# Patient Record
Sex: FEMALE | Race: WHITE | Employment: OTHER | URBAN - METROPOLITAN AREA
[De-identification: names, ages, dates, MRNs, and addresses within clinical notes are randomized per-mention and may not be internally consistent; named-entity substitution may affect disease eponyms.]

---

## 2017-02-10 VITALS
SYSTOLIC BLOOD PRESSURE: 143 MMHG | WEIGHT: 130 LBS | OXYGEN SATURATION: 97 % | TEMPERATURE: 97.5 F | DIASTOLIC BLOOD PRESSURE: 80 MMHG | HEIGHT: 64 IN | BODY MASS INDEX: 22.2 KG/M2 | RESPIRATION RATE: 18 BRPM | HEART RATE: 75 BPM

## 2017-02-10 NOTE — PROGRESS NOTES
[place] [facility]    Chief Complaint   Patient presents with     retirement Regulatory       HPI:    Celeste Hawthorne is a 89 year old  (7/30/1915), who is being seen today for a federally mandated E/M visit at [facility]. Today's concerns are:     1. Benign hypertension with CKD (chronic kidney disease) stage III    2. Hx of pleural effusion, Dec 2016    3. Mild cognitive impairment    4. Gait instability, recent fall in her home    5. Edema of both legs      ALLERGIES: Amoxicillin  PAST MEDICAL HISTORY:  has a past medical history of Anemia; Breast cancer (H); Constipation; History of blood transfusion; Hypertension; Left shoulder pain; Macular degeneration; and Malignant neoplasm (H) (1981).  PAST SURGICAL HISTORY:  has a past surgical history that includes Esophagoscopy (5/11/2012); Breast surgery (1981); Open reduction internal fixation femur distal (5/8/2012); Eye surgery; hemorrhoidectomy; Repair esophageal stricture; and Reverse arthroplasty shoulder (Left, 8/19/2014).  FAMILY HISTORY: family history includes Other Cancer in her son.  SOCIAL HISTORY:  reports that she has never smoked. She has never used smokeless tobacco. She reports that she drinks alcohol. She reports that she does not use illicit drugs.    MEDICATIONS:  Current Outpatient Prescriptions   Medication Sig Dispense Refill     Torsemide (DEMADEX PO) Take 2.5 mg by mouth daily       potassium chloride (KLOR-CON) 20 MEQ Packet Take 20 mEq by mouth daily       OXYCODONE HCL PO Take 2.5 mg by mouth as needed 2 PRN doses in 24 hrs       ammonium lactate (LAC-HYDRIN) 12 % lotion Apply topically 2 times daily Apply to All extremities topically every day and evening shift for Dry Skin       OXYCODONE HCL PO Take 2.5 mg by mouth 3 times daily 6-12-6       Acetaminophen (TYLENOL PO) Take 650 mg by mouth every 6 hours as needed for mild pain or fever       polyethylene glycol (MIRALAX/GLYCOLAX) packet Take 1  "packet by mouth daily       ferrous gluconate (FERGON) 324 (38 FE) MG tablet Take 324 mg by mouth daily (with breakfast)       gabapentin (NEURONTIN) 100 MG capsule Take 300 mg by mouth 2 times daily        phenylephrine-shark liver oil-mineral oil-petrolatum (PREP-HEM) 0.25-3-14-71.9 % rectal ointment Place 1 applicator rectally as needed for hemorrhoids       VITAMIN D, CHOLECALCIFEROL, PO Take 1,000 Units by mouth daily       multivitamin (OCUVITE) TABS Take 1 tablet by mouth daily       COMPRESSION STOCKINGS 1 each continuous. 1 each 0     atenolol (TENORMIN) 25 MG tablet Take 25 mg by mouth daily.       Medications reviewed:  Medications reconciled to facility chart and changes were made to reflect current medications as identified as above med list. Below are the changes that were made:   Medications stopped since last EPIC medication reconciliation:   There are no discontinued medications.    Medications started since last [facility] medication reconciliation:  No orders of the defined types were placed in this encounter.       Case Management:  I have reviewed the care plan and MDS and do agree with the plan. Patient's desire to return to the community is not present.  Information reviewed:  Medications, vital signs, orders, and nursing notes.    ROS:  4 point ROS including Respiratory, CV, GI and , other than that noted in the HPI,  is negative    Exam:  /80  Pulse 75  Temp 97.5  F (36.4  C)  Resp 18  Ht 5' 4\" (1.626 m)  Wt 130 lb (59 kg)  SpO2 97%  BMI 22.31 kg/m2  GENERAL APPEARANCE:  Alert, in no distress, cooperative  ENT:  Mouth and posterior oropharynx normal, moist mucous membranes, [place], does well in 1:1  EYES:  EOM, conjunctivae, lids, pupils and irises normal  RESP:  respiratory effort and palpation of chest normal, lungs clear to auscultation , no respiratory distress  CV:  Palpation and auscultation of heart done , regular rate and rhythm, no murmur, rub, or gallop, no murmur 3/6  ABDOMEN:  " normal bowel sounds, soft, nontender, no hepatosplenomegaly or other masses,   She had a soft large BM> I helped her to get cleaned up  M/S:   Gait and station abnormal > she stands well with assist  Depends on staff for transfers, is transported by staff with a w/c  SKIN:  Soft and intact  NEURO:   Cranial nerves 2-12 are normal tested and grossly at patient's baseline  PSYCH:  oriented to herself, insight and judgement impaired, memory impaired , very pleasant    Lab/Diagnostic data:      Last Basic Metabolic Panel:  NA      142   12/9/2016   POTASSIUM      3.9   12/9/2016  CHLORIDE      103   12/9/2016  HEIDY      9.6   12/9/2016  CO2       34   12/9/2016  BUN       26   12/9/2016  CR     1.46   12/9/2016  GLC       85   12/9/2016    WBC     12.9   12/5/2016  RBC     3.26   12/5/2016  HGB     10.8   12/5/2016  HCT     33.2   12/5/2016  MCV      102   12/5/2016  MCH     33.1   12/5/2016  MCHC     32.5   12/5/2016  RDW     13.1   12/5/2016  PLT      147   12/5/2016    ASSESSMENT/PLAN     Benign hypertension with CKD (chronic kidney disease) stage III> will renally adjust meds  Hx of pleural effusion> will cont with current tx> periodic BMP  Mild cognitive impairment> no new tx  Gait instability> encourage for staff to assist her with all transfers  Edema of both legs> cont with current JESSIE hose    Orders:  none    Total time spent with patient visit was 35 min including patient visit and review of past records.Greater than 50% of total time spent with counseling and coordinating care.    Electronically signed by:  ANN MARIE Prasad CNP

## 2017-02-13 ENCOUNTER — NURSING HOME VISIT (OUTPATIENT)
Dept: GERIATRICS | Facility: CLINIC | Age: 82
End: 2017-02-13
Payer: COMMERCIAL

## 2017-02-13 DIAGNOSIS — Z87.09 HX OF PLEURAL EFFUSION: ICD-10-CM

## 2017-02-13 DIAGNOSIS — R26.81 GAIT INSTABILITY: ICD-10-CM

## 2017-02-13 DIAGNOSIS — I12.9 BENIGN HYPERTENSION WITH CKD (CHRONIC KIDNEY DISEASE) STAGE III (H): Primary | ICD-10-CM

## 2017-02-13 DIAGNOSIS — N18.30 BENIGN HYPERTENSION WITH CKD (CHRONIC KIDNEY DISEASE) STAGE III (H): Primary | ICD-10-CM

## 2017-02-13 DIAGNOSIS — R60.0 EDEMA OF BOTH LEGS: ICD-10-CM

## 2017-02-13 DIAGNOSIS — G31.84 MILD COGNITIVE IMPAIRMENT: ICD-10-CM

## 2017-02-13 PROCEDURE — 99310 SBSQ NF CARE HIGH MDM 45: CPT | Performed by: NURSE PRACTITIONER

## 2017-02-13 PROCEDURE — 99207 ZZC CDG-UP CODE -MED NECESSITY: CPT | Performed by: NURSE PRACTITIONER

## 2017-03-10 ENCOUNTER — TELEPHONE (OUTPATIENT)
Dept: GERIATRICS | Facility: CLINIC | Age: 82
End: 2017-03-10

## 2017-03-10 NOTE — TELEPHONE ENCOUNTER
The health plan new enrollment has happened. I have reviewed the  MDS and facility care plan. The level of care is appropriate.

## 2017-03-10 NOTE — TELEPHONE ENCOUNTER
The health plan {[facility] product:084008} has happened. I have reviewed the  MDS and facility care plan. The level of care is appropriate.

## 2017-03-27 ENCOUNTER — HOSPITAL LABORATORY (OUTPATIENT)
Dept: OTHER | Facility: CLINIC | Age: 82
End: 2017-03-27

## 2017-03-27 LAB
ANION GAP SERPL CALCULATED.3IONS-SCNC: 7 MMOL/L (ref 3–14)
BUN SERPL-MCNC: 21 MG/DL (ref 7–30)
CALCIUM SERPL-MCNC: 9.2 MG/DL (ref 8.5–10.1)
CHLORIDE SERPL-SCNC: 104 MMOL/L (ref 94–109)
CO2 SERPL-SCNC: 32 MMOL/L (ref 20–32)
CREAT SERPL-MCNC: 1.23 MG/DL (ref 0.52–1.04)
GFR SERPL CREATININE-BSD FRML MDRD: 40 ML/MIN/1.7M2
GLUCOSE SERPL-MCNC: 163 MG/DL (ref 70–99)
HGB BLD-MCNC: 12.1 G/DL (ref 11.7–15.7)
POTASSIUM SERPL-SCNC: 4.2 MMOL/L (ref 3.4–5.3)
SODIUM SERPL-SCNC: 143 MMOL/L (ref 133–144)

## 2017-04-03 ENCOUNTER — NURSING HOME VISIT (OUTPATIENT)
Dept: GERIATRICS | Facility: CLINIC | Age: 82
End: 2017-04-03
Payer: COMMERCIAL

## 2017-04-03 VITALS
HEART RATE: 78 BPM | TEMPERATURE: 98.4 F | BODY MASS INDEX: 23.05 KG/M2 | WEIGHT: 135 LBS | SYSTOLIC BLOOD PRESSURE: 146 MMHG | DIASTOLIC BLOOD PRESSURE: 84 MMHG | HEIGHT: 64 IN | OXYGEN SATURATION: 95 % | RESPIRATION RATE: 18 BRPM

## 2017-04-03 DIAGNOSIS — R60.0 EDEMA OF BOTH LEGS: ICD-10-CM

## 2017-04-03 DIAGNOSIS — G31.84 MILD COGNITIVE IMPAIRMENT: ICD-10-CM

## 2017-04-03 DIAGNOSIS — N18.30 BENIGN HYPERTENSION WITH CKD (CHRONIC KIDNEY DISEASE) STAGE III (H): ICD-10-CM

## 2017-04-03 DIAGNOSIS — I12.9 BENIGN HYPERTENSION WITH CKD (CHRONIC KIDNEY DISEASE) STAGE III (H): ICD-10-CM

## 2017-04-03 DIAGNOSIS — M25.551 HIP PAIN, RIGHT: ICD-10-CM

## 2017-04-03 DIAGNOSIS — R54 FRAILTY: Primary | ICD-10-CM

## 2017-04-03 PROCEDURE — 99309 SBSQ NF CARE MODERATE MDM 30: CPT | Performed by: NURSE PRACTITIONER

## 2017-04-03 NOTE — PROGRESS NOTES
[place] [facility]    Chief Complaint   Patient presents with     Musculoskeletal Problem       HPI:    Celeste Hawthorne is a 89 year old  (7/30/1915), who is being seen today for an episodic care visit at [facility]. Today's concern is:  Frailty  She does look frail but also tough    Hip pain, right>> since she had a fall  She did talk about the R hip being bothersome    Mild cognitive impairment  She needed to reminded that she had fallen and the hip pain started afterwards    Benign hypertension with CKD (chronic kidney disease) stage III  FGR 40 , creat 1.23    Edema of both legs  She only had minimal edema this mid morning      ALLERGIES: Amoxicillin  Past Medical, Surgical, Family and Social History reviewed and updated in [facility].    Current Outpatient Prescriptions   Medication Sig Dispense Refill     ARTIFICIAL TEAR OP Place 1 drop into both eyes 2 times daily       Torsemide (DEMADEX PO) Take 2.5 mg by mouth daily       potassium chloride (KLOR-CON) 20 MEQ Packet Take 20 mEq by mouth daily       ammonium lactate (LAC-HYDRIN) 12 % lotion Apply topically 2 times daily Apply to All extremities topically every day and evening shift for Dry Skin       OXYCODONE HCL PO Take 2.5 mg by mouth 3 times daily 6-12-6       polyethylene glycol (MIRALAX/GLYCOLAX) packet Take 1 packet by mouth daily       ferrous gluconate (FERGON) 324 (38 FE) MG tablet Take 324 mg by mouth daily (with breakfast)       gabapentin (NEURONTIN) 100 MG capsule Take 300 mg by mouth 2 times daily        VITAMIN D, CHOLECALCIFEROL, PO Take 1,000 Units by mouth daily       multivitamin (OCUVITE) TABS Take 1 tablet by mouth daily       COMPRESSION STOCKINGS 1 each continuous. 1 each 0     atenolol (TENORMIN) 25 MG tablet Take 25 mg by mouth daily.       Medications reviewed:  Medications reconciled to facility chart and changes were made to reflect current medications as identified as above med list. Below are the  "changes that were made:   Medications stopped since last EPIC medication reconciliation:   Medications Discontinued During This Encounter   Medication Reason     Acetaminophen (TYLENOL PO) Therapy completed     OXYCODONE HCL PO Therapy completed     phenylephrine-shark liver oil-mineral oil-petrolatum (PREP-HEM) 0.25-3-14-71.9 % rectal ointment Therapy completed       Medications started since last [facility] medication reconciliation:  Orders Placed This Encounter   Medications     ARTIFICIAL TEAR OP     Sig: Place 1 drop into both eyes 2 times daily       REVIEW OF SYSTEMS:  4 point ROS including Respiratory, CV, GI and , other than that noted in the HPI,  is negative    Physical Exam:  /84  Pulse 78  Temp 98.4  F (36.9  C)  Resp 18  Ht 5' 4\" (1.626 m)  Wt 135 lb (61.2 kg)  SpO2 95%  BMI 23.17 kg/m2  GENERAL APPEARANCE:  Alert, in no distress, cooperative, she was pinning her hair in curls because she had a bath this am  ENT:  Mouth and posterior oropharynx normal, moist mucous membranes, normal hearing acuity  EYES:  EOM, conjunctivae, lids, pupils and irises normal  RESP:  respiratory effort and palpation of chest normal, lungs clear to auscultation , no respiratory distress  CV:  Palpation and auscultation of heart done , regular rate and rhythm, no murmur, rub, or gallop, peripheral edema trace+ in ankles  ABDOMEN:  normal bowel sounds, soft, nontender, no hepatosplenomegaly or other masses  M/S:   Gait and station abnormal > Depends on staff for transfers, is transported by staff with a w/c  SKIN:  Soft and intact  NEURO:   Cranial nerves 2-12 are normal tested and grossly at patient's baseline  PSYCH:  oriented to herself, insight and judgement impaired, memory impaired , affect and mood normal, pleasant    Recent Labs:      Hemoglobin   Date Value Ref Range Status   03/27/2017 12.1 11.7 - 15.7 g/dL Final   12/05/2016 10.8 (L) 11.7 - 15.7 g/dL Final     Last Basic Metabolic Panel:  Lab Results "   Component Value Date     03/27/2017      Lab Results   Component Value Date    POTASSIUM 4.2 03/27/2017     Lab Results   Component Value Date    CHLORIDE 104 03/27/2017     Lab Results   Component Value Date    HEIDY 9.2 03/27/2017     Lab Results   Component Value Date    CO2 32 03/27/2017     Lab Results   Component Value Date    BUN 21 03/27/2017     Lab Results   Component Value Date    CR 1.23 03/27/2017     Lab Results   Component Value Date     03/27/2017     Lab Results   Component Value Date    WBC 12.9 12/05/2016     Lab Results   Component Value Date    RBC 3.26 12/05/2016     Lab Results   Component Value Date    HGB 12.1 03/27/2017     Lab Results   Component Value Date    HCT 33.2 12/05/2016     Lab Results   Component Value Date     12/05/2016     Lab Results   Component Value Date    MCH 33.1 12/05/2016     Lab Results   Component Value Date    MCHC 32.5 12/05/2016     Lab Results   Component Value Date    RDW 13.1 12/05/2016     Lab Results   Component Value Date     12/05/2016         Assessment/Plan:  Frailty  Cont with supportive care    Hip pain, right>> since she had a fall  Cont with current scheduled pain meds    Mild cognitive impairment  No new tx  Cont with supportive care    Benign hypertension with CKD (chronic kidney disease) stage III  Will renally adjust meds    Edema of both legs  No change in tx 2nd to CKD      Orders:  none    Time 30 min    Electronically signed by  ANN MARIE Prasad CNP

## 2017-04-11 VITALS
HEIGHT: 64 IN | SYSTOLIC BLOOD PRESSURE: 149 MMHG | BODY MASS INDEX: 23.35 KG/M2 | WEIGHT: 136.8 LBS | HEART RATE: 66 BPM | OXYGEN SATURATION: 94 % | DIASTOLIC BLOOD PRESSURE: 68 MMHG | TEMPERATURE: 97.6 F | RESPIRATION RATE: 17 BRPM

## 2017-04-11 NOTE — PROGRESS NOTES
[place] [facility]    Chief Complaint   Patient presents with     shelter Regulatory       HPI:    Celeste Hawthorne is a 89 year old  (7/30/1915), who is being seen today for a federally mandated E/M visit at [facility]. Today's report:    Was started on low dose demadex. This has made an improvement.     Does have right hip pain. Did have a fall. No fracture. On low dose oxycodone. Needs assistance for transfers. Does self propel in the wheelchair.    ALLERGIES: Amoxicillin  PAST MEDICAL HISTORY:  has a past medical history of Anemia; Breast cancer (H); Constipation; History of blood transfusion; Hypertension; Left shoulder pain; Macular degeneration; and Malignant neoplasm (H) (1981).  PAST SURGICAL HISTORY:  has a past surgical history that includes Esophagoscopy (5/11/2012); Breast surgery (1981); Open reduction internal fixation femur distal (5/8/2012); Eye surgery; hemorrhoidectomy; Repair esophageal stricture; and Reverse arthroplasty shoulder (Left, 8/19/2014).  FAMILY HISTORY: family history includes Other Cancer in her son.  SOCIAL HISTORY:  reports that she has never smoked. She has never used smokeless tobacco. She reports that she drinks alcohol. She reports that she does not use illicit drugs.    MEDICATIONS:  Current Outpatient Prescriptions   Medication Sig Dispense Refill     OSELTAMIVIR PHOSPHATE PO Take 30 mg by mouth every other day for 5 Administrations       ARTIFICIAL TEAR OP Place 1 drop into both eyes 2 times daily       Torsemide (DEMADEX PO) Take 2.5 mg by mouth daily       potassium chloride (KLOR-CON) 20 MEQ Packet Take 20 mEq by mouth daily       ammonium lactate (LAC-HYDRIN) 12 % lotion Apply topically 2 times daily Apply to All extremities topically every day and evening shift for Dry Skin       OXYCODONE HCL PO Take 2.5 mg by mouth 3 times daily 6-12-6       polyethylene glycol (MIRALAX/GLYCOLAX) packet Take 1 packet by mouth daily        "ferrous gluconate (FERGON) 324 (38 FE) MG tablet Take 324 mg by mouth daily (with breakfast)       gabapentin (NEURONTIN) 100 MG capsule Take 300 mg by mouth 2 times daily        VITAMIN D, CHOLECALCIFEROL, PO Take 1,000 Units by mouth daily       multivitamin (OCUVITE) TABS Take 1 tablet by mouth daily       COMPRESSION STOCKINGS 1 each continuous. 1 each 0     atenolol (TENORMIN) 25 MG tablet Take 25 mg by mouth daily.       Medications reviewed:  Medications reconciled to facility chart and changes were made to reflect current medications as identified as above med list. Below are the changes that were made:   Medications stopped since last EPIC medication reconciliation:   There are no discontinued medications.    Medications started since last [facility] medication reconciliation:  Orders Placed This Encounter   Medications     OSELTAMIVIR PHOSPHATE PO     Sig: Take 30 mg by mouth every other day for 5 Administrations         Information reviewed:  Medications, vital signs.    ROS:  CV:  No palpitations.  RESPIRATORY: denies shortness of breath  GI:  Bowels working OK  MUSCULOSKELETAL: denies pain x right hip if she tries to stand.      Exam:  /68  Pulse 66  Temp 97.6  F (36.4  C)  Resp 17  Ht 5' 4\" (1.626 m)  Wt 136 lb 12.8 oz (62.1 kg)  SpO2 94%  BMI 23.48 kg/m2  GENERAL APPEARANCE:  Alert, in no distress  ENT:  Mucus membranes moist  RESP:  lungs clear to auscultation   CV:  Regular rate and rhythm.  ABDOMEN: soft, nontender  M/S:   Trace edema; has compression stockings.  PSYCH:   affect and mood normal       Lab/Diagnostic data:      Hemoglobin   Date Value Ref Range Status   03/27/2017 12.1 11.7 - 15.7 g/dL Final   12/05/2016 10.8 (L) 11.7 - 15.7 g/dL Final     Last Basic Metabolic Panel:  Lab Results   Component Value Date     03/27/2017      Lab Results   Component Value Date    POTASSIUM 4.2 03/27/2017     Lab Results   Component Value Date    CHLORIDE 104 03/27/2017     Lab Results "   Component Value Date    HEIDY 9.2 03/27/2017     Lab Results   Component Value Date    CO2 32 03/27/2017     Lab Results   Component Value Date    BUN 21 03/27/2017     Lab Results   Component Value Date    CR 1.23 03/27/2017     Lab Results   Component Value Date     03/27/2017     Lab Results   Component Value Date    WBC 12.9 12/05/2016     Lab Results   Component Value Date    RBC 3.26 12/05/2016     Lab Results   Component Value Date    HGB 12.1 03/27/2017     Lab Results   Component Value Date    HCT 33.2 12/05/2016     Lab Results   Component Value Date     12/05/2016     Lab Results   Component Value Date    MCH 33.1 12/05/2016     Lab Results   Component Value Date    MCHC 32.5 12/05/2016     Lab Results   Component Value Date    RDW 13.1 12/05/2016     Lab Results   Component Value Date     12/05/2016         ASSESSMENT/PLAN    Benign hypertension with CKD (chronic kidney disease) stage III  Relatively controlled.     Dementia without behavioral disturbance, unspecified dementia type  Continue current supports.     Hip pain, right>> since she had a fall  She is OK at rest; currently controlled.     Frailty  Continue current supports.      Electronically signed by:  Candie Amaya MD, MD

## 2017-04-12 ENCOUNTER — NURSING HOME VISIT (OUTPATIENT)
Dept: FAMILY MEDICINE | Facility: CLINIC | Age: 82
End: 2017-04-12
Payer: COMMERCIAL

## 2017-04-12 DIAGNOSIS — N18.30 BENIGN HYPERTENSION WITH CKD (CHRONIC KIDNEY DISEASE) STAGE III (H): Primary | ICD-10-CM

## 2017-04-12 DIAGNOSIS — R54 FRAILTY: ICD-10-CM

## 2017-04-12 DIAGNOSIS — F03.90 DEMENTIA WITHOUT BEHAVIORAL DISTURBANCE, UNSPECIFIED DEMENTIA TYPE: ICD-10-CM

## 2017-04-12 DIAGNOSIS — I12.9 BENIGN HYPERTENSION WITH CKD (CHRONIC KIDNEY DISEASE) STAGE III (H): Primary | ICD-10-CM

## 2017-04-12 DIAGNOSIS — M25.551 HIP PAIN, RIGHT: ICD-10-CM

## 2017-04-12 PROCEDURE — 99308 SBSQ NF CARE LOW MDM 20: CPT | Performed by: FAMILY MEDICINE

## 2017-04-12 NOTE — MR AVS SNAPSHOT
"              After Visit Summary   2017    Celeste Hawthorne    MRN: [id number]           Patient Information     Date Of Birth          1915        Visit Information        Provider Department      2017 1:37 PM Candie Amaya MD [facility]        Today's Diagnoses     Benign hypertension with CKD (chronic kidney disease) stage III    -  1    Dementia without behavioral disturbance, unspecified dementia type        Hip pain, right>> since she had a fall        Frailty           Follow-ups after your visit        Who to contact     If you have questions or need follow up information about today's clinic visit or your schedule please contact [facility] directly at [phone].  Normal or non-critical lab and imaging results will be communicated to you by MyChart, letter or phone within 4 business days after the clinic has received the results. If you do not hear from us within 7 days, please contact the clinic through MyChart or phone. If you have a critical or abnormal lab result, we will notify you by phone as soon as possible.  Submit refill requests through [employer] or call your pharmacy and they will forward the refill request to us. Please allow 3 business days for your refill to be completed.          Additional Information About Your Visit        MyChart Information     [employer] lets you send messages to your doctor, view your test results, renew your prescriptions, schedule appointments and more. To sign up, go to [email or web address] . Click on \"Log in\" on the left side of the screen, which will take you to the Welcome page. Then click on \"Sign up Now\" on the right side of the page.     You will be asked to enter the access code listed below, as well as some personal information. Please follow the directions to create your username and password.     Your access code is: [email or web address]  Expires: 2017 10:12 AM     Your access code will  in 90 days. If you " "need help or a new code, please call your [facility] clinic or [phone].        Care EveryWhere ID     This is your Care EveryWhere ID. This could be used by other organizations to access your [facility] medical records  [id number]        Your Vitals Were     Pulse Temperature Respirations Height Pulse Oximetry BMI (Body Mass Index)    66 97.6  F (36.4  C) 17 5' 4\" (1.626 m) 94% 23.48 kg/m2       Blood Pressure from Last 3 Encounters:   04/11/17 149/68   04/03/17 146/84   02/10/17 143/80    Weight from Last 3 Encounters:   04/11/17 136 lb 12.8 oz (62.1 kg)   04/03/17 135 lb (61.2 kg)   02/10/17 130 lb (59 kg)              Today, you had the following     No orders found for display       Primary Care Provider Office Phone # Fax #    Jaleesa Larkin ANN MARIE Leach [phone]       [facility] [address]  [address]  [facility] [address]        Thank you!     Thank you for choosing [facility]  for your care. Our goal is always to provide you with excellent care. Hearing back from our patients is one way we can continue to improve our services. Please take a few minutes to complete the written survey that you may receive in the mail after your visit with us. Thank you!             Your Updated Medication List - Protect others around you: Learn how to safely use, store and throw away your medicines at www.disposemymeds.org.          This list is accurate as of: 4/12/17 10:12 AM.  Always use your most recent med list.                   Brand Name Dispense Instructions for use    ammonium lactate 12 % lotion    LAC-HYDRIN     Apply topically 2 times daily Apply to All extremities topically every day and evening shift for Dry Skin       ARTIFICIAL TEAR OP      Place 1 drop into both eyes 2 times daily       atenolol 25 MG tablet    TENORMIN     Take 25 mg by mouth daily.       COMPRESSION STOCKINGS     1 each    1 each continuous.       DEMADEX PO      Take 2.5 mg by mouth " daily       ferrous gluconate 324 (38 FE) MG tablet    FERGON     Take 324 mg by mouth daily (with breakfast)       gabapentin 100 MG capsule    NEURONTIN     Take 300 mg by mouth 2 times daily       multivitamin Tabs tablet      Take 1 tablet by mouth daily       OSELTAMIVIR PHOSPHATE PO      Take 30 mg by mouth every other day for 5 Administrations       OXYCODONE HCL PO      Take 2.5 mg by mouth 3 times daily 6-12-6       polyethylene glycol Packet    MIRALAX/GLYCOLAX     Take 1 packet by mouth daily       potassium chloride 20 MEQ Packet    KLOR-CON     Take 20 mEq by mouth daily       VITAMIN D (CHOLECALCIFEROL) PO      Take 1,000 Units by mouth daily

## 2017-06-12 ENCOUNTER — NURSING HOME VISIT (OUTPATIENT)
Dept: GERIATRICS | Facility: CLINIC | Age: 82
End: 2017-06-12
Payer: COMMERCIAL

## 2017-06-12 VITALS
SYSTOLIC BLOOD PRESSURE: 140 MMHG | DIASTOLIC BLOOD PRESSURE: 73 MMHG | TEMPERATURE: 97.8 F | HEIGHT: 64 IN | OXYGEN SATURATION: 95 % | WEIGHT: 134.5 LBS | BODY MASS INDEX: 22.96 KG/M2 | RESPIRATION RATE: 18 BRPM

## 2017-06-12 DIAGNOSIS — N18.30 BENIGN HYPERTENSION WITH CKD (CHRONIC KIDNEY DISEASE) STAGE III (H): Primary | ICD-10-CM

## 2017-06-12 DIAGNOSIS — F03.90 DEMENTIA WITHOUT BEHAVIORAL DISTURBANCE, UNSPECIFIED DEMENTIA TYPE: ICD-10-CM

## 2017-06-12 DIAGNOSIS — Z86.718 PERSONAL HISTORY OF DVT (DEEP VEIN THROMBOSIS): ICD-10-CM

## 2017-06-12 DIAGNOSIS — R60.0 EDEMA OF BOTH LEGS: ICD-10-CM

## 2017-06-12 DIAGNOSIS — I12.9 BENIGN HYPERTENSION WITH CKD (CHRONIC KIDNEY DISEASE) STAGE III (H): Primary | ICD-10-CM

## 2017-06-12 DIAGNOSIS — M25.551 HIP PAIN, RIGHT: ICD-10-CM

## 2017-06-12 PROBLEM — Z87.81 HX OF FRACTURE OF FEMUR: Status: ACTIVE | Noted: 2017-06-12

## 2017-06-12 PROCEDURE — 99309 SBSQ NF CARE MODERATE MDM 30: CPT | Performed by: NURSE PRACTITIONER

## 2017-06-12 NOTE — PROGRESS NOTES
[place] [facility]    Chief Complaint   Patient presents with     custodial Regulatory       HPI:    Celeste Hawthorne is a 89 year old  (7/30/1915), who is being seen today for an regulatory visit at [facility].  HPI information obtained from: facility chart records.  Today's concern is:  Benign hypertension with CKD (chronic kidney disease) stage III  BP ~ 130/80  GFR 40    Edema of both legs  She did tell me that the edema bothers her    Personal history of DVT (deep vein thrombosis)  Has resolved> has not been on coumadin since 2015 per EPIC documentation    Dementia without behavioral disturbance, unspecified dementia type  She is pleasant , she keeps herself busy with word search    Hip pain, right>> since she had a fall  She stated the R hip hurt whenever she stands up      ALLERGIES: Amoxicillin  Past Medical, Surgical, Family and Social History reviewed and updated in [facility].    Current Outpatient Prescriptions   Medication Sig Dispense Refill     ARTIFICIAL TEAR OP Place 1 drop into both eyes 2 times daily       Torsemide (DEMADEX PO) Take 2.5 mg by mouth daily       potassium chloride (KLOR-CON) 20 MEQ Packet Take 20 mEq by mouth daily       ammonium lactate (LAC-HYDRIN) 12 % lotion Apply topically 2 times daily Apply to All extremities topically every day and evening shift for Dry Skin       OXYCODONE HCL PO Take 2.5 mg by mouth 3 times daily 6-12-6       polyethylene glycol (MIRALAX/GLYCOLAX) packet Take 1 packet by mouth daily       ferrous gluconate (FERGON) 324 (38 FE) MG tablet Take 324 mg by mouth daily (with breakfast)       gabapentin (NEURONTIN) 100 MG capsule Take 300 mg by mouth 2 times daily        VITAMIN D, CHOLECALCIFEROL, PO Take 1,000 Units by mouth daily       multivitamin (OCUVITE) TABS Take 1 tablet by mouth daily       COMPRESSION STOCKINGS 1 each continuous. 1 each 0     atenolol (TENORMIN) 25 MG tablet Take 25 mg by mouth daily.       Medications  "reviewed:  Medications reconciled to facility chart and changes were made to reflect current medications as identified as above med list. Below are the changes that were made:   Medications stopped since last EPIC medication reconciliation:   There are no discontinued medications.    Medications started since last [facility] medication reconciliation:  No orders of the defined types were placed in this encounter.    REVIEW OF SYSTEMS:  4 point ROS including Respiratory, CV, GI and , other than that noted in the HPI,  is negative    Physical Exam:  /73  Temp 97.8  F (36.6  C)  Resp 18  Ht 5' 4\" (1.626 m)  Wt 134 lb 8 oz (61 kg)  SpO2 95%  BMI 23.09 kg/m2  GENERAL APPEARANCE:  Alert, in no distress, cooperative, pleasant  ENT:  Mouth and posterior oropharynx normal, moist mucous membranes, [place], wears HAs  EYES:  EOM, conjunctivae, lids, pupils and irises normal, nice eye contact  RESP:  respiratory effort and palpation of chest normal, lungs clear to auscultation , no respiratory distress  CV:  Palpation and auscultation of heart done , peripheral edema 3+ in toes to knees, rate-normal, grade 3/6 murmur  ABDOMEN:  normal bowel sounds, soft, nontender, no hepatosplenomegaly or other masses  M/S:   Gait and station abnormal > she self propels her self in the w/c> needs assist for transfers  SKIN:  Soft and intact  NEURO:   Cranial nerves 2-12 are normal tested and grossly at patient's baseline  PSYCH:  oriented to herself and her surroundings, normal insight, judgement and memory    BP:  05/29- 06/12: 124-160/64-84 mmHg    Recent Labs:  CBC RESULTS:   Recent Labs   Lab Test  03/27/17   0925  12/05/16   0955   09/30/16   1641   WBC   --   12.9*   --   7.2   RBC   --   3.26*   --   3.28*   HGB  12.1  10.8*   < >  11.1*   HCT   --   33.2*   --   34.3*   MCV   --   102*   --   105*   MCH   --   33.1*   --   33.8*   MCHC   --   32.5   --   32.4   RDW   --   13.1   --   14.7   PLT   --   147*   --   150    < > = " values in this interval not displayed.       Last Basic Metabolic Panel:  Recent Labs   Lab Test  03/27/17   0925  12/09/16   0700   NA  143  142   POTASSIUM  4.2  3.9   CHLORIDE  104  103   HEIDY  9.2  9.6   CO2  32  34*   BUN  21  26   CR  1.23*  1.46*   GLC  163*  85     Assessment/Plan:  Benign hypertension with CKD (chronic kidney disease) stage III  No change in tx  Will renally adjust med as indicated    Edema of both legs  Not able to increase diuretic 2nd to impairment  I encouraged her to elevate her LE    Personal history of DVT (deep vein thrombosis)  Has resolved> off all anti-coagulation meds since 2015    Dementia without behavioral disturbance, unspecified dementia type  No new tx    Hip pain, right>> since she had a fall  She stated that the scheduled pain meds are effective  No change in tx      Orders:  Will stop the Fergon> then recheck Hgb in 4 weeks    Time> 30 min    Electronically signed by  ANN MARIE Prasad CNP

## 2017-06-16 ENCOUNTER — NURSING HOME VISIT (OUTPATIENT)
Dept: GERIATRICS | Facility: CLINIC | Age: 82
End: 2017-06-16
Payer: COMMERCIAL

## 2017-06-16 DIAGNOSIS — R05.9 COUGH: Primary | ICD-10-CM

## 2017-06-16 DIAGNOSIS — K44.9 HIATAL HERNIA: ICD-10-CM

## 2017-06-16 PROCEDURE — 99308 SBSQ NF CARE LOW MDM 20: CPT | Performed by: NURSE PRACTITIONER

## 2017-06-16 NOTE — PROGRESS NOTES
[place] [facility]    Chief Complaint   Patient presents with     Cough       HPI:    Celeste Hawthorne is a 89 year old  (7/30/1915), who is being seen today for an episodic care visit at [facility].   Today's concern is: nursing alerted me that Celeste is coughing  Cough  New cough> is not bother some to her    Hiatal hernia  Per chest x-ray      REVIEW OF SYSTEMS:  4 point ROS including Respiratory, CV, GI and , other than that noted in the HPI,  is negative    GENERAL APPEARANCE:  Alert, in no distress  She was mostly interested in going to a [employer] concert and told me about her  who played the piano    CV> AP ~ 72 RRR, soft murmur  Lungs> Has clear breath sounds, no adventitious sounds, no cough during my visit    Assessment/Plan  Cough  I did order chest xray> bi basilar atelectasis> she stated that her  smoked  Cardiomegaly      Hiatal hernia  No dx via chest x-ray> I told her and her dtr that this might at time cause her to cough  She denied acid reflux> no new tx  She is aware that she sits in a stooped manner    Dtr was present during my visit  And agreed with no new meds        Time 25 min    ANN MARIE Prasad CNP

## 2017-07-12 ENCOUNTER — HOSPITAL LABORATORY (OUTPATIENT)
Dept: OTHER | Facility: CLINIC | Age: 82
End: 2017-07-12

## 2017-07-12 LAB — HGB BLD-MCNC: 12.4 G/DL (ref 11.7–15.7)

## 2017-07-24 ENCOUNTER — HOSPITAL LABORATORY (OUTPATIENT)
Dept: OTHER | Facility: CLINIC | Age: 82
End: 2017-07-24

## 2017-07-24 LAB
ANION GAP SERPL CALCULATED.3IONS-SCNC: 6 MMOL/L (ref 3–14)
BUN SERPL-MCNC: 20 MG/DL (ref 7–30)
CALCIUM SERPL-MCNC: 9.3 MG/DL (ref 8.5–10.1)
CHLORIDE SERPL-SCNC: 105 MMOL/L (ref 94–109)
CO2 SERPL-SCNC: 32 MMOL/L (ref 20–32)
CREAT SERPL-MCNC: 1.13 MG/DL (ref 0.52–1.04)
GFR SERPL CREATININE-BSD FRML MDRD: 44 ML/MIN/1.7M2
GLUCOSE SERPL-MCNC: 66 MG/DL (ref 70–99)
HGB BLD-MCNC: 12.1 G/DL (ref 11.7–15.7)
POTASSIUM SERPL-SCNC: 4.2 MMOL/L (ref 3.4–5.3)
SODIUM SERPL-SCNC: 143 MMOL/L (ref 133–144)

## 2017-07-28 ENCOUNTER — NURSING HOME VISIT (OUTPATIENT)
Dept: GERIATRICS | Facility: CLINIC | Age: 82
End: 2017-07-28
Payer: COMMERCIAL

## 2017-07-28 VITALS
RESPIRATION RATE: 16 BRPM | OXYGEN SATURATION: 95 % | TEMPERATURE: 97.5 F | DIASTOLIC BLOOD PRESSURE: 84 MMHG | HEART RATE: 84 BPM | SYSTOLIC BLOOD PRESSURE: 146 MMHG | HEIGHT: 64 IN | WEIGHT: 135 LBS | BODY MASS INDEX: 23.05 KG/M2

## 2017-07-28 DIAGNOSIS — R60.0 EDEMA OF BOTH LEGS: ICD-10-CM

## 2017-07-28 DIAGNOSIS — I12.9 BENIGN HYPERTENSION WITH CKD (CHRONIC KIDNEY DISEASE) STAGE III (H): Primary | ICD-10-CM

## 2017-07-28 DIAGNOSIS — H54.3 IMPAIRED VISION IN BOTH EYES: ICD-10-CM

## 2017-07-28 DIAGNOSIS — N18.30 BENIGN HYPERTENSION WITH CKD (CHRONIC KIDNEY DISEASE) STAGE III (H): Primary | ICD-10-CM

## 2017-07-28 DIAGNOSIS — F03.90 DEMENTIA WITHOUT BEHAVIORAL DISTURBANCE, UNSPECIFIED DEMENTIA TYPE: ICD-10-CM

## 2017-07-28 PROCEDURE — 99309 SBSQ NF CARE MODERATE MDM 30: CPT | Performed by: NURSE PRACTITIONER

## 2017-07-28 NOTE — PROGRESS NOTES
[place] [facility]    Chief Complaint   Patient presents with     RECHECK     Leg Swelling       HPI:    Celeste Hawthorne is a 89 year old  (7/30/1915), who is being seen today for an episodic care visit at [facility].  HPI information obtained from: facility chart records.  Today's concern is:  Benign hypertension with CKD (chronic kidney disease) stage III  BP ~ 140/80  Occ higher with current tx    Edema of both legs  Does have 2-3 + pitting elisabeth in her feet    Impaired vision in both eyes  Is able to use large print but wishes she could see better    Dementia without behavioral disturbance, unspecified dementia type  Is very pleasant      ALLERGIES: Amoxicillin  Past Medical, Surgical, Family and Social History reviewed and updated in [facility].    Current Outpatient Prescriptions   Medication Sig Dispense Refill     ARTIFICIAL TEAR OP Place 1 drop into both eyes 2 times daily       Torsemide (DEMADEX PO) Take 2.5 mg by mouth daily       potassium chloride (KLOR-CON) 20 MEQ Packet Take 20 mEq by mouth daily       ammonium lactate (LAC-HYDRIN) 12 % lotion Apply topically 2 times daily Apply to All extremities topically every day and evening shift for Dry Skin       OXYCODONE HCL PO Take 2.5 mg by mouth 3 times daily 6-12-6       polyethylene glycol (MIRALAX/GLYCOLAX) packet Take 1 packet by mouth daily       gabapentin (NEURONTIN) 100 MG capsule Take 300 mg by mouth 2 times daily        VITAMIN D, CHOLECALCIFEROL, PO Take 1,000 Units by mouth daily       multivitamin (OCUVITE) TABS Take 1 tablet by mouth daily       COMPRESSION STOCKINGS 1 each continuous. 1 each 0     atenolol (TENORMIN) 25 MG tablet Take 25 mg by mouth daily.       Medications reviewed:  Medications reconciled to facility chart and changes were made to reflect current medications as identified as above med list. Below are the changes that were made:   Medications stopped since last EPIC medication reconciliation:  "  Medications Discontinued During This Encounter   Medication Reason     ferrous gluconate (FERGON) 324 (38 FE) MG tablet Medication Reconciliation Clean Up       Medications started since last EPIC medication reconciliation:  No orders of the defined types were placed in this encounter.    REVIEW OF SYSTEMS:  4 point ROS including Respiratory, CV, GI and , other than that noted in the HPI,  is negative    Physical Exam:  /84  Pulse 84  Temp 97.5  F (36.4  C)  Resp 16  Ht 5' 4\" (1.626 m)  Wt 135 lb (61.2 kg)  SpO2 95%  BMI 23.17 kg/m2  GENERAL APPEARANCE:  Alert, in no distress, cooperative  ENT:  Mouth and posterior oropharynx normal, moist mucous membranes, [place], uses HAs  EYES:  EOM, conjunctivae, lids, pupils and irises normal, wishes she could see better, does well with large print  RESP:  respiratory effort and palpation of chest normal, lungs clear to auscultation , no respiratory distress  CV:  Palpation and auscultation of heart done , peripheral edema 3+ in both feet, grade 3/6 murmur  ABDOMEN:  normal bowel sounds, soft, nontender, no hepatosplenomegaly or other masses  M/S:   Gait and station abnormal >Depends on staff for transfers, is transported by staff with a w/c  SKIN:  Soft and intact  NEURO:   Cranial nerves 2-12 are normal tested and grossly at patient's baseline  PSYCH:  normal insight, judgement and memory, affect and mood normal, is remarkable, told me that she will be 89 yrs old in 2 days     BP 07/20-07/27: 132-168/76-94 mmHg    Recent Labs:    CBC RESULTS:   Recent Labs   Lab Test  07/24/17   0636  07/12/17   0605   12/05/16   0955   09/30/16   1641   WBC   --    --    --   12.9*   --   7.2   RBC   --    --    --   3.26*   --   3.28*   HGB  12.1  12.4   < >  10.8*   < >  11.1*   HCT   --    --    --   33.2*   --   34.3*   MCV   --    --    --   102*   --   105*   MCH   --    --    --   33.1*   --   33.8*   MCHC   --    --    --   32.5   --   32.4   RDW   --    --    --   " 13.1   --   14.7   PLT   --    --    --   147*   --   150    < > = values in this interval not displayed.       Last Basic Metabolic Panel:  Recent Labs   Lab Test  07/24/17   0636  03/27/17   0925   NA  143  143   POTASSIUM  4.2  4.2   CHLORIDE  105  104   HEIDY  9.3  9.2   CO2  32  32   BUN  20  21   CR  1.13*  1.23*   GLC  66*  163*     Assessment/Plan:  Benign hypertension with CKD (chronic kidney disease) stage III  Will talk with MD about adding low dose Apresoline    Edema of both legs  Low dose Apresoline may also help with LE edema    Impaired vision in both eyes  Dtr wishes to take mother to Ophthalmologist who has seen her for yrs    Dementia without behavioral disturbance, unspecified dementia type  No tx      Orders:  none    Time> 25 min    Electronically signed by  ANN MARIE Prasad CNP

## 2017-08-15 VITALS
TEMPERATURE: 96.9 F | HEIGHT: 64 IN | OXYGEN SATURATION: 96 % | DIASTOLIC BLOOD PRESSURE: 76 MMHG | WEIGHT: 134 LBS | HEART RATE: 68 BPM | RESPIRATION RATE: 16 BRPM | SYSTOLIC BLOOD PRESSURE: 143 MMHG | BODY MASS INDEX: 22.88 KG/M2

## 2017-08-16 ENCOUNTER — NURSING HOME VISIT (OUTPATIENT)
Dept: FAMILY MEDICINE | Facility: CLINIC | Age: 82
End: 2017-08-16
Payer: COMMERCIAL

## 2017-08-16 DIAGNOSIS — H54.7 DECREASED VISION: ICD-10-CM

## 2017-08-16 DIAGNOSIS — M25.551 HIP PAIN, RIGHT: ICD-10-CM

## 2017-08-16 DIAGNOSIS — R60.9 EDEMA, UNSPECIFIED TYPE: Primary | ICD-10-CM

## 2017-08-16 DIAGNOSIS — I12.9 BENIGN HYPERTENSION WITH CKD (CHRONIC KIDNEY DISEASE) STAGE III (H): ICD-10-CM

## 2017-08-16 DIAGNOSIS — N18.30 CKD (CHRONIC KIDNEY DISEASE) STAGE 3, GFR 30-59 ML/MIN (H): ICD-10-CM

## 2017-08-16 DIAGNOSIS — N18.30 BENIGN HYPERTENSION WITH CKD (CHRONIC KIDNEY DISEASE) STAGE III (H): ICD-10-CM

## 2017-08-16 PROCEDURE — 99309 SBSQ NF CARE MODERATE MDM 30: CPT | Performed by: FAMILY MEDICINE

## 2017-08-16 NOTE — MR AVS SNAPSHOT
"              After Visit Summary   2017    Celeste Hawthorne    MRN: [id number]           Patient Information     Date Of Birth          1915        Visit Information        Provider Department      2017 8:47 PM Candie Amaya MD [facility]        Today's Diagnoses     Edema, unspecified type    -  1    CKD (chronic kidney disease) stage 3, GFR 30-59 ml/min        Benign hypertension with CKD (chronic kidney disease) stage III        Decreased vision        Hip pain, right           Follow-ups after your visit        Who to contact     If you have questions or need follow up information about today's clinic visit or your schedule please contact [facility] directly at [phone].  Normal or non-critical lab and imaging results will be communicated to you by MyChart, letter or phone within 4 business days after the clinic has received the results. If you do not hear from us within 7 days, please contact the clinic through [employer] or phone. If you have a critical or abnormal lab result, we will notify you by phone as soon as possible.  Submit refill requests through [employer] or call your pharmacy and they will forward the refill request to us. Please allow 3 business days for your refill to be completed.          Additional Information About Your Visit        MyChart Information     [employer] lets you send messages to your doctor, view your test results, renew your prescriptions, schedule appointments and more. To sign up, go to [email or web address] . Click on \"Log in\" on the left side of the screen, which will take you to the Welcome page. Then click on \"Sign up Now\" on the right side of the page.     You will be asked to enter the access code listed below, as well as some personal information. Please follow the directions to create your username and password.     Your access code is: [email or web address]  Expires: 2017 11:41 AM     Your access code will  in 90 days. " "If you need help or a new code, please call your [place] clinic or [phone].        Care EveryWhere ID     This is your Care EveryWhere ID. This could be used by other organizations to access your [facility] medical records  [id number]        Your Vitals Were     Pulse Temperature Respirations Height Pulse Oximetry BMI (Body Mass Index)    68 96.9  F (36.1  C) 16 5' 4\" (1.626 m) 96% 23 kg/m2       Blood Pressure from Last 3 Encounters:   08/15/17 143/76   07/28/17 146/84   06/12/17 140/73    Weight from Last 3 Encounters:   08/15/17 134 lb (60.8 kg)   07/28/17 135 lb (61.2 kg)   06/12/17 134 lb 8 oz (61 kg)              Today, you had the following     No orders found for display       Primary Care Provider Office Phone # Fax #    Jaleesa Larkin MarvCarlos A, APRN [facility] [phone]       [address]  [facility] [address]        Equal Access to Services     [facility]: Hadii aad ku hadasho Soomaali, waaxda luqadaha, qaybta kaalmada adeegyada, marysol chang . So [facility] [phone].    ATENCIÓN: Si habla español, tiene a bailon disposición servicios gratuitos de asistencia lingüística. [facility] [phone].    We comply with applicable federal civil rights laws and [place] laws. We do not discriminate on the basis of race, color, national origin, age, disability sex, sexual orientation or gender identity.            Thank you!     Thank you for choosing [facility]  for your care. Our goal is always to provide you with excellent care. Hearing back from our patients is one way we can continue to improve our services. Please take a few minutes to complete the written survey that you may receive in the mail after your visit with us. Thank you!             Your Updated Medication List - Protect others around you: Learn how to safely use, store and throw away your medicines at www.disposemymeds.org.          This list is accurate as of: 8/16/17 11:41 AM.  " Always use your most recent med list.                   Brand Name Dispense Instructions for use Diagnosis    ammonium lactate 12 % lotion    LAC-HYDRIN     Apply topically 2 times daily Apply to All extremities topically every day and evening shift for Dry Skin        ARTIFICIAL TEAR OP      Place 1 drop into both eyes 2 times daily        atenolol 25 MG tablet    TENORMIN     Take 25 mg by mouth daily.        COMPRESSION STOCKINGS     1 each    1 each continuous.    Femur fracture, right (H)       DEMADEX PO      Take 2.5 mg by mouth daily        gabapentin 100 MG capsule    NEURONTIN     Take 300 mg by mouth 2 times daily        multivitamin Tabs tablet      Take 1 tablet by mouth daily        OXYCODONE HCL PO      Take 2.5 mg by mouth 3 times daily 6-12-6        polyethylene glycol Packet    MIRALAX/GLYCOLAX     Take 1 packet by mouth daily        potassium chloride 20 MEQ Packet    KLOR-CON     Take 20 mEq by mouth daily        VITAMIN D (CHOLECALCIFEROL) PO      Take 1,000 Units by mouth daily

## 2017-08-16 NOTE — PROGRESS NOTES
[place] [facility]    Chief Complaint   Patient presents with     FPC Regulatory       HPI:    Celeste Hawthorne is a 89 year old  (7/30/1915), who is being seen today for a federally mandated E/M visit at [facility].  HPI information obtained from: facility chart records. Today's report:    She has been stable.    She had a cough a few weeks ago.  Did have CXR. No pneumonia. There were other chronic changes.  No antibiotic.    Family would like her to see eye doctor again.   She has thought her glasses were not working as well.  She does have macular degeneration.   They may do that.     Does have edema. Can be significant.  She does have CKD.   She does wear support hose.     Ongoing right hip pain.  Gets scheduled oxycodone.    Some forgetfulness, but is fairly cognitively intact.      ALLERGIES: Amoxicillin  PAST MEDICAL HISTORY:  has a past medical history of Anemia; Anemia due to blood loss, acute (5/14/2012); Breast cancer (H); Constipation; History of blood transfusion; Hypertension; Left shoulder pain; Macular degeneration; and Malignant neoplasm (H) (1981).  PAST SURGICAL HISTORY:  has a past surgical history that includes Esophagoscopy (5/11/2012); Breast surgery (1981); Open reduction internal fixation femur distal (5/8/2012); Eye surgery; hemorrhoidectomy; Repair esophageal stricture; and Reverse arthroplasty shoulder (Left, 8/19/2014).  FAMILY HISTORY: family history includes Other Cancer in her son.  SOCIAL HISTORY:  reports that she has never smoked. She has never used smokeless tobacco. She reports that she drinks alcohol. She reports that she does not use illicit drugs.    MEDICATIONS:  Current Outpatient Prescriptions   Medication Sig Dispense Refill     ARTIFICIAL TEAR OP Place 1 drop into both eyes 2 times daily       Torsemide (DEMADEX PO) Take 2.5 mg by mouth daily       potassium chloride (KLOR-CON) 20 MEQ Packet Take 20 mEq by mouth daily        "ammonium lactate (LAC-HYDRIN) 12 % lotion Apply topically 2 times daily Apply to All extremities topically every day and evening shift for Dry Skin       OXYCODONE HCL PO Take 2.5 mg by mouth 3 times daily 6-12-6       polyethylene glycol (MIRALAX/GLYCOLAX) packet Take 1 packet by mouth daily       gabapentin (NEURONTIN) 100 MG capsule Take 300 mg by mouth 2 times daily        VITAMIN D, CHOLECALCIFEROL, PO Take 1,000 Units by mouth daily       multivitamin (OCUVITE) TABS Take 1 tablet by mouth daily       COMPRESSION STOCKINGS 1 each continuous. 1 each 0     atenolol (TENORMIN) 25 MG tablet Take 25 mg by mouth daily.       Medications reviewed:  Medications reconciled to facility chart and changes were made to reflect current medications as identified as above med list. Below are the changes that were made:   Medications stopped since last EPIC medication reconciliation:   There are no discontinued medications.    Medications started since last [facility] medication reconciliation:  No orders of the defined types were placed in this encounter.          ROS:  CV:  No palpitations.  RESPIRATORY: denies shortness of breath  GI:  Bowels working OK  MUSCULOSKELETAL: denies pain when just sitting, but does have ongoing right hip/leg pain if laying down or moving.      Exam:  Vitals: /76  Pulse 68  Temp 96.9  F (36.1  C)  Resp 16  Ht 5' 4\" (1.626 m)  Wt 134 lb (60.8 kg)  SpO2 96%  BMI 23 kg/m2  BMI= Body mass index is 23 kg/(m^2).    GENERAL APPEARANCE:  Alert, in no distress. Appears younger than stated age.  ENT:  Mucus membranes moist  RESP:  lungs clear to auscultation   CV:  Regular rate and rhythm.  ABDOMEN:  soft, nontender  M/S:   ~1+ edema  PSYCH:   affect and mood normal       Lab/Diagnostic data:    CBC RESULTS:   Recent Labs   Lab Test  07/24/17   0636  07/12/17   0605   12/05/16   0955   09/30/16   1641   WBC   --    --    --   12.9*   --   7.2   RBC   --    --    --   3.26*   --   3.28*   HGB  12.1  " 12.4   < >  10.8*   < >  11.1*   HCT   --    --    --   33.2*   --   34.3*   MCV   --    --    --   102*   --   105*   MCH   --    --    --   33.1*   --   33.8*   MCHC   --    --    --   32.5   --   32.4   RDW   --    --    --   13.1   --   14.7   PLT   --    --    --   147*   --   150    < > = values in this interval not displayed.       Last Basic Metabolic Panel:  Recent Labs   Lab Test  07/24/17   0636  03/27/17   0925   NA  143  143   POTASSIUM  4.2  4.2   CHLORIDE  105  104   HEIDY  9.3  9.2   CO2  32  32   BUN  20  21   CR  1.13*  1.23*   GLC  66*  163*     GFR Estimate   Date Value Ref Range Status   07/24/2017 44 (L) >60 mL/min/1.7m2 Final     Comment:     Non  GFR Calc   03/27/2017 40 (L) >60 mL/min/1.7m2 Final     Comment:     Non  GFR Calc   12/09/2016 33 (L) >60 mL/min/1.7m2 Final     Comment:     Non  GFR Calc           ASSESSMENT/PLAN  Edema, unspecified type  Currently on torsemide.  Agree with not increasing further; concerns with kidney.  Agree with encouraging mechanical methods with compression stockings, elevation    CKD (chronic kidney disease) stage 3, GFR 30-59 ml/min  Keep in consideration when adjusting or adding medication as above.    Benign hypertension with CKD (chronic kidney disease) stage III  Satisfactory control.    Decreased vision  She does have macular degeneration; not sure if much can be done for her vision if this is the explanation. Family will probably take to eye doctor.     Hip pain, right  Chronic since a fall. Has been evaluated in the past. She does appear more comfortable with the oxycodone. She has been out and joining into activities.       Electronically signed by:  Candie Amaya MD, MD

## 2017-10-05 VITALS
TEMPERATURE: 98.2 F | DIASTOLIC BLOOD PRESSURE: 90 MMHG | HEART RATE: 77 BPM | BODY MASS INDEX: 23.56 KG/M2 | WEIGHT: 138 LBS | HEIGHT: 64 IN | RESPIRATION RATE: 16 BRPM | OXYGEN SATURATION: 95 % | SYSTOLIC BLOOD PRESSURE: 158 MMHG

## 2017-10-05 NOTE — PROGRESS NOTES
[facility]    Chief Complaint   Patient presents with     skilled nursing Regulatory       HPI:    Celeste Hawthorne is a 89 year old  (7/30/1915), who is being seen today for a federally mandated E/M visit at [facility].  HPI information obtained from: facility chart records, facility staff, patient report and [facility] chart review.   Today's concerns are:  Benign hypertension with CKD (chronic kidney disease) stage III  BP's ~ 140/90      Hx of pleural effusion, Dec 2016  She denied SOB or cough today    Hip pain, right>> since she had a fall  Cont to have discomfort in R hip elisabeth with standing    CKD (chronic kidney disease) stage 3, GFR 30-59 ml/min  GFR 44; creat 1.13    Edema of both legs  The edema always bothers her      ALLERGIES: Amoxicillin  PAST MEDICAL HISTORY:  has a past medical history of Anemia; Anemia due to blood loss, acute (5/14/2012); Breast cancer (H); Constipation; History of blood transfusion; Hypertension; Left shoulder pain; Macular degeneration; and Malignant neoplasm (H) (1981).  PAST SURGICAL HISTORY:  has a past surgical history that includes Esophagoscopy (5/11/2012); Breast surgery (1981); Open reduction internal fixation femur distal (5/8/2012); Eye surgery; hemorrhoidectomy; Repair esophageal stricture; and Reverse arthroplasty shoulder (Left, 8/19/2014).  FAMILY HISTORY: family history includes Other Cancer in her son.  SOCIAL HISTORY:  reports that she has never smoked. She has never used smokeless tobacco. She reports that she drinks alcohol. She reports that she does not use illicit drugs.    MEDICATIONS:  Current Outpatient Prescriptions   Medication Sig Dispense Refill     ARTIFICIAL TEAR OP Place 1 drop into both eyes 2 times daily       Torsemide (DEMADEX PO) Take 2.5 mg by mouth daily       potassium chloride (KLOR-CON) 20 MEQ Packet Take 20 mEq by mouth daily       ammonium lactate (LAC-HYDRIN) 12 % lotion Apply topically 2  "times daily Apply to All extremities topically every day and evening shift for Dry Skin       OXYCODONE HCL PO Take 2.5 mg by mouth 3 times daily 6-12-6       polyethylene glycol (MIRALAX/GLYCOLAX) packet Take 1 packet by mouth daily       gabapentin (NEURONTIN) 100 MG capsule Take 300 mg by mouth 2 times daily        VITAMIN D, CHOLECALCIFEROL, PO Take 1,000 Units by mouth daily       multivitamin (OCUVITE) TABS Take 1 tablet by mouth daily       COMPRESSION STOCKINGS 1 each continuous. 1 each 0     atenolol (TENORMIN) 25 MG tablet Take 25 mg by mouth daily.       Medications reviewed:  Medications reconciled to facility chart and changes were made to reflect current medications as identified as above med list. Below are the changes that were made:   Medications stopped since last EPIC medication reconciliation:   There are no discontinued medications.    Medications started since last [facility] medication reconciliation:  No orders of the defined types were placed in this encounter.      Case Management:  I have reviewed the care plan and MDS and do agree with the plan. Patient's desire to return to the community is not present.  Information reviewed:  Medications, vital signs, orders, and nursing notes.  The health plan product change has happened. I have reviewed the  MDS, the preventative needs,  and facility care plan. The level of care is appropriate. I have reviewed the code status/advanced directives.     ROS:  4 point ROS including Respiratory, CV, GI and , other than that noted in the HPI,  is negative    Exam:  Vitals: /90  Pulse 77  Temp 98.2  F (36.8  C)  Resp 16  Ht 5' 4\" (1.626 m)  Wt 138 lb (62.6 kg)  SpO2 95%  BMI 23.69 kg/m2  BMI= Body mass index is 23.69 kg/(m^2).  GENERAL APPEARANCE:  Alert, in no distress, cooperative, she looks remarkable for her age  ENT:  Mouth and posterior oropharynx normal, moist mucous membranes, normal hearing acuity  EYES:  EOM, conjunctivae, lids, pupils and " irises normal  RESP:  respiratory effort and palpation of chest normal, no respiratory distress, bi basilar crackles> she is not aware  CV:  Palpation and auscultation of heart done , regular rate and rhythm, no murmur, rub, or gallop, peripheral edema 2-3+ in toes to mid thighs  ABDOMEN:  normal bowel sounds, soft, nontender, no hepatosplenomegaly or other masses  M/S:   Gait and station abnormal > she transfer with assist, then self propels her w/c  SKIN:  Soft and intact  NEURO:   Cranial nerves 2-12 are normal tested and grossly at patient's baseline  PSYCH:  oriented to herself and her surroundings, insight and judgement impaired, memory impaired , affect and mood normal     BP Range 9/25-10/5: 132-158/70-80 mmHg      Lab/Diagnostic data:   CBC RESULTS:   Recent Labs   Lab Test  07/24/17   0636  07/12/17   0605   12/05/16   0955   09/30/16   1641   WBC   --    --    --   12.9*   --   7.2   RBC   --    --    --   3.26*   --   3.28*   HGB  12.1  12.4   < >  10.8*   < >  11.1*   HCT   --    --    --   33.2*   --   34.3*   MCV   --    --    --   102*   --   105*   MCH   --    --    --   33.1*   --   33.8*   MCHC   --    --    --   32.5   --   32.4   RDW   --    --    --   13.1   --   14.7   PLT   --    --    --   147*   --   150    < > = values in this interval not displayed.       Last Basic Metabolic Panel:  Recent Labs   Lab Test  07/24/17   0636  03/27/17   0925   NA  143  143   POTASSIUM  4.2  4.2   CHLORIDE  105  104   HEIDY  9.3  9.2   CO2  32  32   BUN  20  21   CR  1.13*  1.23*   GLC  66*  163*       Liver Function Studies -   Recent Labs   Lab Test  03/31/15   1958   PROTTOTAL  6.2*   ALBUMIN  3.4   BILITOTAL  0.4   ALKPHOS  52   AST  21   ALT  16         ASSESSMENT/PLAN  Benign hypertension with CKD (chronic kidney disease) stage III  Some of the high BP's may be related to nursing technique  She does not tolerate increase in meds    Hx of pleural effusion, Dec 2016  Does have some crackles> no SOB  Will  not increase diuretic at this time  Safe a potential med increase for a more acute period    Hip pain, right>> since she had a fall  Will cont with current pain meds    CKD (chronic kidney disease) stage 3, GFR 30-59 ml/min  Not able to decrease meds at this time  Will avoid adding more diuretics to avoid decrease in renal function    Edema of both legs  I talked with her about her LE  She will cont to wear her support hose  I have encouraged her to rest on her bed mid am and mid pm  She does not like this option      Orders:  none    Total time spent with patient visit at the skilled nursing facility was 35 min including patient visit and review of past records. Greater than 50% of total time spent with counseling and coordinating care due to she has complex care needs    Electronically signed by:  ANN MARIE Prasad CNP

## 2017-10-06 ENCOUNTER — NURSING HOME VISIT (OUTPATIENT)
Dept: GERIATRICS | Facility: CLINIC | Age: 82
End: 2017-10-06
Payer: COMMERCIAL

## 2017-10-06 DIAGNOSIS — M25.551 HIP PAIN, RIGHT: ICD-10-CM

## 2017-10-06 DIAGNOSIS — I12.9 BENIGN HYPERTENSION WITH CKD (CHRONIC KIDNEY DISEASE) STAGE III (H): Primary | ICD-10-CM

## 2017-10-06 DIAGNOSIS — N18.30 CKD (CHRONIC KIDNEY DISEASE) STAGE 3, GFR 30-59 ML/MIN (H): ICD-10-CM

## 2017-10-06 DIAGNOSIS — R60.0 EDEMA OF BOTH LEGS: ICD-10-CM

## 2017-10-06 DIAGNOSIS — Z87.09 HX OF PLEURAL EFFUSION: ICD-10-CM

## 2017-10-06 DIAGNOSIS — N18.30 BENIGN HYPERTENSION WITH CKD (CHRONIC KIDNEY DISEASE) STAGE III (H): Primary | ICD-10-CM

## 2017-10-06 PROCEDURE — 99309 SBSQ NF CARE MODERATE MDM 30: CPT | Performed by: NURSE PRACTITIONER

## 2017-10-18 ENCOUNTER — NURSING HOME VISIT (OUTPATIENT)
Dept: GERIATRICS | Facility: CLINIC | Age: 82
End: 2017-10-18
Payer: COMMERCIAL

## 2017-10-18 VITALS
BODY MASS INDEX: 23.9 KG/M2 | HEIGHT: 64 IN | DIASTOLIC BLOOD PRESSURE: 68 MMHG | RESPIRATION RATE: 16 BRPM | SYSTOLIC BLOOD PRESSURE: 146 MMHG | WEIGHT: 140 LBS | TEMPERATURE: 98.4 F | HEART RATE: 75 BPM | OXYGEN SATURATION: 95 %

## 2017-10-18 DIAGNOSIS — M12.812 LEFT ROTATOR CUFF TEAR ARTHROPATHY: ICD-10-CM

## 2017-10-18 DIAGNOSIS — H35.30 MACULAR DEGENERATION (SENILE) OF RETINA: ICD-10-CM

## 2017-10-18 DIAGNOSIS — M75.102 LEFT ROTATOR CUFF TEAR ARTHROPATHY: ICD-10-CM

## 2017-10-18 DIAGNOSIS — F03.90 DEMENTIA WITHOUT BEHAVIORAL DISTURBANCE, UNSPECIFIED DEMENTIA TYPE: ICD-10-CM

## 2017-10-18 DIAGNOSIS — I12.9 BENIGN HYPERTENSION WITH CKD (CHRONIC KIDNEY DISEASE) STAGE III (H): ICD-10-CM

## 2017-10-18 DIAGNOSIS — Z00.00 ROUTINE GENERAL MEDICAL EXAMINATION AT A HEALTH CARE FACILITY: ICD-10-CM

## 2017-10-18 DIAGNOSIS — H61.23 BILATERAL IMPACTED CERUMEN: ICD-10-CM

## 2017-10-18 DIAGNOSIS — R60.0 EDEMA OF BOTH LEGS: ICD-10-CM

## 2017-10-18 DIAGNOSIS — Z71.89 ADVANCED DIRECTIVES, COUNSELING/DISCUSSION: Chronic | ICD-10-CM

## 2017-10-18 DIAGNOSIS — M25.551 HIP PAIN, RIGHT: Primary | ICD-10-CM

## 2017-10-18 DIAGNOSIS — N18.30 BENIGN HYPERTENSION WITH CKD (CHRONIC KIDNEY DISEASE) STAGE III (H): ICD-10-CM

## 2017-10-18 PROCEDURE — 99318 ZZC ANNUAL NURSING FAC ASSESSMNT, STABLE: CPT | Mod: 25 | Performed by: NURSE PRACTITIONER

## 2017-10-18 PROCEDURE — 69210 REMOVE IMPACTED EAR WAX UNI: CPT | Performed by: NURSE PRACTITIONER

## 2017-10-18 NOTE — PROGRESS NOTES
[facility]  Chief Complaint   Patient presents with     Annual Comprehensive Nursing Home       HPI:    Celeste Hawthorne is a 89 year old  (7/30/1915), who is being seen today for an annual comprehensive visit at [facility].  HPI information obtained from: facility chart records.    Today's concerns are:  Hip pain, right>> since she had a fall  She stated that her R hip hurts when she puts weight on the R LE    Dementia without behavioral disturbance, unspecified dementia type  She is pleasant, knows her age  Stated that she still plays card games, enjoys activities    Edema of both legs  The edema bothers her, but she does not wish to rest on her bed during the day    Benign hypertension with CKD (chronic kidney disease) stage III  GFR 44, creat 1.13  ~ BP < 140/90    Left rotator cuff tear arthropathy: 8/19/14  She stated that she has some pain in the L shoulder    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  She stated that she has seen the ophthalmologist> no more tx    Routine general medical examination at a health care facility,done 10-  Completed today    Advance Care Planning  Left v.m for dtr    Bilateral impacted cerumen  TMs obstructed  - REMOVE IMPACTED CERUMEN  Depression> PHQ9 negative 10-4-2017> no meds    Based on JNC-8 goals,  patients age of 89 year old, no presence of diabetes or CKD, and goals of care goal BP is <150/90 mm Hg. patient is stable with current plan of care and routine assessment..    Depression screen done: PHQ-9 Given screen score and clinical assessment patient is stable without any signs of depression and no further interventions warranted at this time.      ALLERGIES: Amoxicillin  PROBLEM LIST:  Patient Active Problem List   Diagnosis     Fall: at home     Physical deconditioning     Dysphagia due to Esophageal stricture/dysmotility Issues     Edema of both legs     Secondary localized osteoarthrosis, shoulder region      Left rotator cuff tear arthropathy: 8/19/14     Pain in shoulder     Constipation     Gait instability, recent fall in her home     Personal history of malignant neoplasm of breast, ~ 80 yrs old     Routine general medical examination at a health care facility,done 10-     Advance Care Planning     Benign hypertension with CKD (chronic kidney disease) stage III     Hip pain, right>> since she had a fall     Adjustment insomnia     Frailty     Hx of pleural effusion, Dec 2016     Dementia without behavioral disturbance, unspecified dementia type     Personal history of DVT (deep vein thrombosis)     Hx of fracture of femur     CKD (chronic kidney disease) stage 3, GFR 30-59 ml/min     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     PAST MEDICAL HISTORY:  has a past medical history of Anemia; Anemia due to blood loss, acute (5/14/2012); Breast cancer (H); Constipation; History of blood transfusion; Hypertension; Left shoulder pain; Macular degeneration; and Malignant neoplasm (H) (1981).  PAST SURGICAL HISTORY:  has a past surgical history that includes Esophagoscopy (5/11/2012); Breast surgery (1981); Open reduction internal fixation femur distal (5/8/2012); Eye surgery; hemorrhoidectomy; Repair esophageal stricture; and Reverse arthroplasty shoulder (Left, 8/19/2014).  FAMILY HISTORY: family history includes Other Cancer in her son.  SOCIAL HISTORY:  reports that she has never smoked. She has never used smokeless tobacco. She reports that she drinks alcohol. She reports that she does not use illicit drugs.  IMMUNIZATIONS:  Most Recent Immunizations   Administered Date(s) Administered     DTAP (<7y) 10/11/2016     Influenza (High Dose) 3 valent vaccine 10/18/2017     Influenza (IIV3) 09/10/2013     Pneumococcal (PCV 13) 10/11/2016     Pneumococcal 23 valent 12/09/2003     TDAP Vaccine (Adacel) 07/28/2006     Above immunizations pulled from [facility]. [facility] and facility records also reconciled.  Outstanding information sent to  to update [facility] >yes.  Future immunizations are not needed at this point as all recommended immunizations are up to date.   MEDICATIONS:  Current Outpatient Prescriptions   Medication Sig Dispense Refill     ARTIFICIAL TEAR OP Place 1 drop into both eyes 4 times daily ; and instill 1 drop in both eyes PRN       Torsemide (DEMADEX PO) Take 2.5 mg by mouth daily       potassium chloride (KLOR-CON) 20 MEQ Packet Take 20 mEq by mouth daily       ammonium lactate (LAC-HYDRIN) 12 % lotion Apply topically 2 times daily Apply to All extremities topically every day and evening shift for Dry Skin       OXYCODONE HCL PO Take 2.5 mg by mouth 3 times daily 6-12-6       polyethylene glycol (MIRALAX/GLYCOLAX) packet Take 1 packet by mouth daily       gabapentin (NEURONTIN) 100 MG capsule Take 300 mg by mouth 2 times daily        VITAMIN D, CHOLECALCIFEROL, PO Take 1,000 Units by mouth daily       multivitamin (OCUVITE) TABS Take 1 tablet by mouth daily       COMPRESSION STOCKINGS 1 each continuous. 1 each 0     atenolol (TENORMIN) 25 MG tablet Take 25 mg by mouth daily.       Medications reviewed:  Medications reconciled to facility chart and changes were made to reflect current medications as identified as above med list. Below are the changes that were made:   Medications stopped since last EPIC medication reconciliation:   There are no discontinued medications.    Medications started since last [facility] medication reconciliation:  No orders of the defined types were placed in this encounter.      Case Management:  I have reviewed the facility/SNF care plan/MDS which was done 10-, including the falls risk, nutrition and pain screening. I also reviewed the current immunizations, and preventive care..Future cancer screening is not clinically indicated secondary to age/goals of care Patient's desire to return to the community is not present. Current Level of Care is  "appropriate.    Advance Directive Discussion:    I reviewed the current advanced directives as reflected in EPIC, the POLST and the facility chart, and verified the congruency of orders >yes. I contacted the first party dtr Candy and left a message regarding the plan of Care.  I did review the advance directives with the resident.     Team Discussion:  I communicated with the appropriate disciplines involved with the Plan of Care:   > charge RN wondered if Celeset should walk more again, Celeste does not wish to walk.    Patient Goal:  Patient's goal is pain control and comfort.    Information reviewed:  Medications, vital signs, orders, and nursing notes.    ROS:  4 point ROS including Respiratory, CV, GI and , other than that noted in the HPI,  is negative    Exam:  /68  Pulse 75  Temp 98.4  F (36.9  C)  Resp 16  Ht 5' 4\" (1.626 m)  Wt 140 lb (63.5 kg)  SpO2 95%  BMI 24.03 kg/m2    Exam:  Constitutional: healthy, alert and no distress  Head: Normocephalic. No masses, lesions, tenderness or abnormalities  Neck: Neck supple. No adenopathy. Thyroid symmetric, normal size,, Carotids without bruits.  ENT: ENT exam normal, no neck nodes or sinus tenderness  TMs obstructed with cerumen> mod amt removed with lighted cerumen loop from R ear canal  Not able to clear L ear canal> cerumen was deep in canal  Cardiovascular: negative, PMI normal. No lifts, heaves, or thrills. RRR. No murmurs, clicks gallops or rub  She does have eliceo LE edema 3+ from to toes to knees  Respiratory: negative, Percussion normal. Good diaphragmatic excursion. Lungs clear  Breasts> R breast is surgically absent  Gastrointestinal: Abdomen soft, non-tender. BS normal. No masses, organomegaly  : Deferred  Musculoskeletal: extremities normal- no gross deformities noted, gait normal and normal muscle tone  She had diff with AROM of L shoulder  She has thickened DIP's and PIP's both hands  Skin: no suspicious lesions or rashes  Neurologic: Gait " normal. Reflexes normal and symmetric. Sensation grossly WNL.  Psychiatric: mentation appears normal and affect normal/bright    BP 10/11-10/18: 130-163/64-87 mmHg    Lab/Diagnostic data:   CBC RESULTS:   Recent Labs   Lab Test  07/24/17   0636  07/12/17   0605   12/05/16   0955   09/30/16   1641   WBC   --    --    --   12.9*   --   7.2   RBC   --    --    --   3.26*   --   3.28*   HGB  12.1  12.4   < >  10.8*   < >  11.1*   HCT   --    --    --   33.2*   --   34.3*   MCV   --    --    --   102*   --   105*   MCH   --    --    --   33.1*   --   33.8*   MCHC   --    --    --   32.5   --   32.4   RDW   --    --    --   13.1   --   14.7   PLT   --    --    --   147*   --   150    < > = values in this interval not displayed.       Last Basic Metabolic Panel:  Recent Labs   Lab Test  07/24/17   0636  03/27/17   0925   NA  143  143   POTASSIUM  4.2  4.2   CHLORIDE  105  104   HEIDY  9.3  9.2   CO2  32  32   BUN  20  21   CR  1.13*  1.23*   GLC  66*  163*         ASSESSMENT/PLAN  Hip pain, right>> since she had a fall  She is doing fair with current meds  Does not express a wish to walk  No change in present plan of care    Dementia without behavioral disturbance, unspecified dementia type  She does well in her current surroundings  She attends most activities  I support her in being active    Edema of both legs  Not able to increase her meds 2nd to CKD  I did again encourage her to rest on her bed mid am and mid pm    Benign hypertension with CKD (chronic kidney disease) stage III  Acceptable BP <150/90  No change in tx    Left rotator cuff tear arthropathy: 8/19/14  Cont with current pain meds    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  She is not asking about more tx  She is still able to do the word search puzzles  She also cont to participate in recreational activities      Routine general medical examination at a health care facility,done 10-    Advance Care Planning  POLST completed. Click on the  code status to view the scanned copy.   Jaleesa Leach  10/18/2017      Bilateral impacted cerumen   Able to remove cerumen from R ear  - REMOVE IMPACTED CERUMEN  She will get Debrox ear drops 4 bid x 5 days  I will then check her ear again    Depression> PHQ9 > 0> no tx  She also does not have sx of depression    Orders:  See A&P  Electronically signed by:  ANN MARIE Prasad CNP

## 2017-10-30 ENCOUNTER — NURSING HOME VISIT (OUTPATIENT)
Dept: GERIATRICS | Facility: CLINIC | Age: 82
End: 2017-10-30
Payer: COMMERCIAL

## 2017-10-30 VITALS
RESPIRATION RATE: 16 BRPM | HEIGHT: 64 IN | BODY MASS INDEX: 24.59 KG/M2 | SYSTOLIC BLOOD PRESSURE: 159 MMHG | WEIGHT: 144 LBS | OXYGEN SATURATION: 95 % | HEART RATE: 79 BPM | TEMPERATURE: 98.5 F | DIASTOLIC BLOOD PRESSURE: 90 MMHG

## 2017-10-30 DIAGNOSIS — M65.30 TRIGGER FINGER, UNSPECIFIED FINGER, UNSPECIFIED LATERALITY: Primary | ICD-10-CM

## 2017-10-30 DIAGNOSIS — M19.049 OSTEOARTHRITIS OF HAND, UNSPECIFIED LATERALITY, UNSPECIFIED OSTEOARTHRITIS TYPE: ICD-10-CM

## 2017-10-30 PROCEDURE — 99308 SBSQ NF CARE LOW MDM 20: CPT | Performed by: NURSE PRACTITIONER

## 2017-10-30 NOTE — PROGRESS NOTES
[facility]    Chief Complaint   Patient presents with     Nursing Home Acute     TRIGGER FINGER       HPI:    Celeste Hawthorne is a 89 year old  (7/30/1915), who is being seen today for an episodic care visit at [facility].  HPI information obtained from: facility chart records.  Today's concern is:  Trigger finger, unspecified finger, unspecified laterality  She is aware of the trigger fingers, there are several on both hands  They are bothering her intermittently    Osteoarthritis of hand, unspecified laterality, unspecified osteoarthritis type  She has thickened joints and was working on getting a ring of her ring finger      ALLERGIES: Amoxicillin  Past Medical, Surgical, Family and Social History reviewed and updated in [facility].    Current Outpatient Prescriptions   Medication Sig Dispense Refill     ACETAMINOPHEN PO Take 650 mg by mouth 3 times daily       ARTIFICIAL TEAR OP Place 1 drop into both eyes 4 times daily ; and instill 1 drop in both eyes PRN       Torsemide (DEMADEX PO) Take 2.5 mg by mouth daily       potassium chloride (KLOR-CON) 20 MEQ Packet Take 20 mEq by mouth daily       ammonium lactate (LAC-HYDRIN) 12 % lotion Apply topically 2 times daily Apply to All extremities topically every day and evening shift for Dry Skin       OXYCODONE HCL PO Take 2.5 mg by mouth 3 times daily 6-12-6       polyethylene glycol (MIRALAX/GLYCOLAX) packet Take 1 packet by mouth daily       gabapentin (NEURONTIN) 100 MG capsule Take 300 mg by mouth 2 times daily        VITAMIN D, CHOLECALCIFEROL, PO Take 1,000 Units by mouth daily       multivitamin (OCUVITE) TABS Take 1 tablet by mouth daily       atenolol (TENORMIN) 25 MG tablet Take 25 mg by mouth daily.       Medications reviewed:  Medications reconciled to facility chart and changes were made to reflect current medications as identified as above med list. Below are the changes that were made:   Medications stopped since  "last EPIC medication reconciliation:   Medications Discontinued During This Encounter   Medication Reason     COMPRESSION STOCKINGS Medication Reconciliation Clean Up       Medications started since last [facility] medication reconciliation:  Orders Placed This Encounter   Medications     ACETAMINOPHEN PO     Sig: Take 650 mg by mouth 3 times daily       REVIEW OF SYSTEMS:  4 point ROS including Respiratory, CV, GI and , other than that noted in the HPI,  is negative    Physical Exam:  /90  Pulse 79  Temp 98.5  F (36.9  C)  Resp 16  Ht 5' 4\" (1.626 m)  Wt 144 lb (65.3 kg)  SpO2 95%  BMI 24.72 kg/m2  GENERAL APPEARANCE:  Alert, in no distress, cooperative  M/S:   She has trigger fingers on both hands, she is able to straighten her fingers on her own  She had a ring on the R ring finger> it was tight  SKIN:  Soft and intact  NEURO:   Cranial nerves 2-12 are normal tested and grossly at patient's baseline  PSYCH:  oriented X 3     BP 10/23-10/30: 138-159/78-90 mmHg    Recent Labs:   CBC RESULTS:   Recent Labs   Lab Test  07/24/17   0636  07/12/17   0605   12/05/16   0955   09/30/16   1641   WBC   --    --    --   12.9*   --   7.2   RBC   --    --    --   3.26*   --   3.28*   HGB  12.1  12.4   < >  10.8*   < >  11.1*   HCT   --    --    --   33.2*   --   34.3*   MCV   --    --    --   102*   --   105*   MCH   --    --    --   33.1*   --   33.8*   MCHC   --    --    --   32.5   --   32.4   RDW   --    --    --   13.1   --   14.7   PLT   --    --    --   147*   --   150    < > = values in this interval not displayed.       Last Basic Metabolic Panel:  Recent Labs   Lab Test  07/24/17   0636  03/27/17   0925   NA  143  143   POTASSIUM  4.2  4.2   CHLORIDE  105  104   HEIDY  9.3  9.2   CO2  32  32   BUN  20  21   CR  1.13*  1.23*   GLC  66*  163*         Assessment/Plan:  Trigger finger, unspecified finger, unspecified laterality  I talked with her about getting cortisone injections by a hand specialist  She does not " want this at this time  I encourage her to bath her hands under warm water and then straighten the fingers     Osteoarthritis of hand, unspecified laterality, unspecified osteoarthritis type  She allowed me to remove the ring from the ring finger by using hand lotion to lubricate the skin      Orders:  See A&P    Time> 25 min  Electronically signed by  ANN MARIE Prasad CNP

## 2017-11-27 ENCOUNTER — HOSPITAL LABORATORY (OUTPATIENT)
Dept: OTHER | Facility: CLINIC | Age: 82
End: 2017-11-27

## 2017-11-27 LAB
ANION GAP SERPL CALCULATED.3IONS-SCNC: 4 MMOL/L (ref 3–14)
BUN SERPL-MCNC: 23 MG/DL (ref 7–30)
CALCIUM SERPL-MCNC: 9.3 MG/DL (ref 8.5–10.1)
CHLORIDE SERPL-SCNC: 103 MMOL/L (ref 94–109)
CO2 SERPL-SCNC: 33 MMOL/L (ref 20–32)
CREAT SERPL-MCNC: 1.26 MG/DL (ref 0.52–1.04)
GFR SERPL CREATININE-BSD FRML MDRD: 39 ML/MIN/1.7M2
GLUCOSE SERPL-MCNC: 88 MG/DL (ref 70–99)
HGB BLD-MCNC: 13.3 G/DL (ref 11.7–15.7)
POTASSIUM SERPL-SCNC: 4.5 MMOL/L (ref 3.4–5.3)
SODIUM SERPL-SCNC: 140 MMOL/L (ref 133–144)

## 2017-11-28 VITALS
BODY MASS INDEX: 24.89 KG/M2 | RESPIRATION RATE: 18 BRPM | WEIGHT: 145.8 LBS | OXYGEN SATURATION: 95 % | SYSTOLIC BLOOD PRESSURE: 143 MMHG | TEMPERATURE: 98.7 F | DIASTOLIC BLOOD PRESSURE: 82 MMHG | HEIGHT: 64 IN | HEART RATE: 78 BPM

## 2017-11-28 NOTE — PROGRESS NOTES
[place] [facility]    Chief Complaint   Patient presents with     Nursing Home Acute       HPI:    Celeste Hawthorne is a 89 year old  (7/30/1915), who is being seen today for an episodic care visit at [facility].  HPI information obtained from: facility chart records, facility staff, patient report and [facility] chart review.  Today's concern is:  Benign hypertension with CKD (chronic kidney disease) stage III  She has had several BP > 140/90 especially the SBP  Her creat is 1.26  The GFR is 39 with current Demadex 2.5 mg     Hx of pleural effusion, Dec 2016  She has been asymptomatic, no cough no SOB    Bilateral leg edema  She is always worried about the edema in both feet from the toes to mid calves    Weight gain  She has gained 10 lbs in the last 6  mos    Trigger finger, acquired  R hand ring finger is bothering her      ALLERGIES: Amoxicillin  Past Medical, Surgical, Family and Social History reviewed and updated in [facility].    Current Outpatient Prescriptions   Medication Sig Dispense Refill     HydrALAZINE HCl (APRESOLINE PO) Take 10 mg by mouth 2 times daily       ACETAMINOPHEN PO Take 650 mg by mouth 3 times daily       ARTIFICIAL TEAR OP Place 1 drop into both eyes 4 times daily ; and instill 1 drop in both eyes PRN       Torsemide (DEMADEX PO) Take 2.5 mg by mouth daily       potassium chloride (KLOR-CON) 20 MEQ Packet Take 20 mEq by mouth daily       ammonium lactate (LAC-HYDRIN) 12 % lotion Apply topically 2 times daily Apply to All extremities topically every day and evening shift for Dry Skin       OXYCODONE HCL PO Take 2.5 mg by mouth 3 times daily 6-12-6       polyethylene glycol (MIRALAX/GLYCOLAX) packet Take 1 packet by mouth daily       gabapentin (NEURONTIN) 100 MG capsule Take 300 mg by mouth 2 times daily        VITAMIN D, CHOLECALCIFEROL, PO Take 1,000 Units by mouth daily       multivitamin (OCUVITE) TABS Take 1 tablet by mouth daily       atenolol  "(TENORMIN) 25 MG tablet Take 25 mg by mouth daily.       Medications reviewed:  Medications reconciled to facility chart and changes were made to reflect current medications as identified as above med list. Below are the changes that were made:   Medications stopped since last EPIC medication reconciliation:   There are no discontinued medications.    Medications started since last [facility] medication reconciliation:  No orders of the defined types were placed in this encounter.    I did add Apresoline 10 mg po bid today    REVIEW OF SYSTEMS:  4 point ROS including Respiratory, CV, GI and , other than that noted in the HPI,  is negative    Physical Exam:  /82  Pulse 78  Temp 98.7  F (37.1  C)  Resp 18  Ht 5' 4\" (1.626 m)  Wt 145 lb 12.8 oz (66.1 kg)  SpO2 95%  BMI 25.03 kg/m2  GENERAL APPEARANCE:  Alert, cooperative  ENT:  Mouth and posterior oropharynx normal, moist mucous membranes, [place]  EYES:  EOM, conjunctivae, lids, pupils and irises normal  RESP:  respiratory effort and palpation of chest normal, lungs clear to auscultation , no respiratory distress  CV:  Palpation and auscultation of heart done , regular rate and rhythm, no murmur, rub, or gallop, peripheral edema 3-2+ in feet from toes to mid calves  ABDOMEN:  normal bowel sounds, soft, nontender, no hepatosplenomegaly or other masses  M/S:   Gait and station abnormal > she needs and gets assist of one for all transfers  She showed me her R ring finger again and told me that it catches and is bothersome  SKIN:  Inspection of skin and subcutaneous tissue baseline  NEURO:   Cranial nerves 2-12 are normal tested and grossly at patient's baseline  PSYCH:  oriented X 3, she has short term memory deficit    BP Range: 120-180/71-92 mmHg    Recent Labs:    CBC RESULTS:   Recent Labs   Lab Test  11/27/17   0900  07/24/17   0636   12/05/16   0955   09/30/16   1641   WBC   --    --    --   12.9*   --   7.2   RBC   --    --    --   3.26*   --   3.28*   HGB  " 13.3  12.1   < >  10.8*   < >  11.1*   HCT   --    --    --   33.2*   --   34.3*   MCV   --    --    --   102*   --   105*   MCH   --    --    --   33.1*   --   33.8*   MCHC   --    --    --   32.5   --   32.4   RDW   --    --    --   13.1   --   14.7   PLT   --    --    --   147*   --   150    < > = values in this interval not displayed.       Last Basic Metabolic Panel:  Recent Labs   Lab Test  11/27/17   0900  07/24/17   0636   NA  140  143   POTASSIUM  4.5  4.2   CHLORIDE  103  105   HEIDY  9.3  9.3   CO2  33*  32   BUN  23  20   CR  1.26*  1.13*   GLC  88  66*       Liver Function Studies -   Recent Labs   Lab Test  03/31/15   1958   PROTTOTAL  6.2*   ALBUMIN  3.4   BILITOTAL  0.4   ALKPHOS  52   AST  21   ALT  16     Assessment/Plan:  Benign hypertension with CKD (chronic kidney disease) stage III  I did add Apresoline 10 mg po bid  Nursing to hold for SBP< 110  Nursing to contact me if SBP> 140  No labs needed    Hx of pleural effusion, Dec 2016  Is asymptomatic    Bilateral leg edema  She may have heart failure which was never diagnoses  Because she has renal insufficiency I cannot increase the Demadex  It is my hope that the Apresoline 10 mg po bid will also decrease the edema  She did state that she is now elevating her legs when she is doing a puzzle    Weight gain  There may be a correlation between the weight gain and HTN  At best the Apresoline will help her shed some water weight  Will recheck in ~ 2 weeks    Trigger finger, acquired  I did talk with her dtr  I gave her an ok to take her mother to a hand surgeon for eval and tx, most likely a cortisone injection into the affected finger      Orders:  See A&P    Total time spent with patient visit at the skilled nursing facility was 35 min including patient visit, review of past records and phone call to patient contact. Greater than 50% of total time spent with counseling and coordinating care due to she has complex care needs    Electronically signed  by  ANN MARIE Prasad CNP

## 2017-11-29 ENCOUNTER — NURSING HOME VISIT (OUTPATIENT)
Dept: GERIATRICS | Facility: CLINIC | Age: 82
End: 2017-11-29
Payer: COMMERCIAL

## 2017-11-29 DIAGNOSIS — R63.5 WEIGHT GAIN: ICD-10-CM

## 2017-11-29 DIAGNOSIS — M65.30 TRIGGER FINGER, ACQUIRED: ICD-10-CM

## 2017-11-29 DIAGNOSIS — I12.9 BENIGN HYPERTENSION WITH CKD (CHRONIC KIDNEY DISEASE) STAGE III (H): Primary | ICD-10-CM

## 2017-11-29 DIAGNOSIS — R60.0 BILATERAL LEG EDEMA: ICD-10-CM

## 2017-11-29 DIAGNOSIS — N18.30 BENIGN HYPERTENSION WITH CKD (CHRONIC KIDNEY DISEASE) STAGE III (H): Primary | ICD-10-CM

## 2017-11-29 DIAGNOSIS — Z87.09 HX OF PLEURAL EFFUSION: ICD-10-CM

## 2017-11-29 PROBLEM — M65.341 TRIGGER RING FINGER OF RIGHT HAND: Status: ACTIVE | Noted: 2017-11-29

## 2017-11-29 PROCEDURE — 99309 SBSQ NF CARE MODERATE MDM 30: CPT | Performed by: NURSE PRACTITIONER

## 2017-12-12 VITALS
RESPIRATION RATE: 16 BRPM | TEMPERATURE: 98.9 F | DIASTOLIC BLOOD PRESSURE: 94 MMHG | WEIGHT: 145.6 LBS | OXYGEN SATURATION: 95 % | SYSTOLIC BLOOD PRESSURE: 156 MMHG | HEIGHT: 64 IN | BODY MASS INDEX: 24.86 KG/M2 | HEART RATE: 88 BPM

## 2017-12-13 ENCOUNTER — NURSING HOME VISIT (OUTPATIENT)
Dept: FAMILY MEDICINE | Facility: CLINIC | Age: 82
End: 2017-12-13
Payer: COMMERCIAL

## 2017-12-13 DIAGNOSIS — N18.30 BENIGN HYPERTENSION WITH CKD (CHRONIC KIDNEY DISEASE) STAGE III (H): Primary | ICD-10-CM

## 2017-12-13 DIAGNOSIS — M65.341 TRIGGER RING FINGER OF RIGHT HAND: ICD-10-CM

## 2017-12-13 DIAGNOSIS — K59.00 CONSTIPATION, UNSPECIFIED CONSTIPATION TYPE: ICD-10-CM

## 2017-12-13 DIAGNOSIS — R54 FRAILTY: ICD-10-CM

## 2017-12-13 DIAGNOSIS — J06.9 VIRAL UPPER RESPIRATORY TRACT INFECTION: ICD-10-CM

## 2017-12-13 DIAGNOSIS — I12.9 BENIGN HYPERTENSION WITH CKD (CHRONIC KIDNEY DISEASE) STAGE III (H): Primary | ICD-10-CM

## 2017-12-13 DIAGNOSIS — F03.90 DEMENTIA WITHOUT BEHAVIORAL DISTURBANCE, UNSPECIFIED DEMENTIA TYPE: ICD-10-CM

## 2017-12-13 PROCEDURE — 99309 SBSQ NF CARE MODERATE MDM 30: CPT | Performed by: FAMILY MEDICINE

## 2017-12-13 NOTE — MR AVS SNAPSHOT
"              After Visit Summary   12/13/2017    Celeste Hawthorne    MRN: [id number]           Patient Information     Date Of Birth          7/30/1915        Visit Information        Provider Department      12/13/2017 10:04 PM Candie Amaya MD [facility]        Today's Diagnoses     Benign hypertension with CKD (chronic kidney disease) stage III    -  1    Frailty        Trigger ring finger of right hand        Viral upper respiratory tract infection        Dementia without behavioral disturbance, unspecified dementia type        Constipation, unspecified constipation type           Follow-ups after your visit        Who to contact     If you have questions or need follow up information about today's clinic visit or your schedule please contact [facility] directly at [phone].  Normal or non-critical lab and imaging results will be communicated to you by MyChart, letter or phone within 4 business days after the clinic has received the results. If you do not hear from us within 7 days, please contact the clinic through MyChart or phone. If you have a critical or abnormal lab result, we will notify you by phone as soon as possible.  Submit refill requests through [employer] or call your pharmacy and they will forward the refill request to us. Please allow 3 business days for your refill to be completed.          Additional Information About Your Visit        MyChart Information     [employer] lets you send messages to your doctor, view your test results, renew your prescriptions, schedule appointments and more. To sign up, go to [email or web address] . Click on \"Log in\" on the left side of the screen, which will take you to the Welcome page. Then click on \"Sign up Now\" on the right side of the page.     You will be asked to enter the access code listed below, as well as some personal information. Please follow the directions to create your username and password.     Your access code " "is: [email or web address]  Expires: 3/13/2018  9:57 AM     Your access code will  in 90 days. If you need help or a new code, please call your [facility] clinic or [phone].        Care EveryWhere ID     This is your Care EveryWhere ID. This could be used by other organizations to access your [facility] medical records  [id number]        Your Vitals Were     Pulse Temperature Respirations Height Pulse Oximetry BMI (Body Mass Index)    88 98.9  F (37.2  C) 16 5' 4\" (1.626 m) 95% 24.99 kg/m2       Blood Pressure from Last 3 Encounters:   17 (!) 156/94   17 143/82   10/30/17 159/90    Weight from Last 3 Encounters:   17 145 lb 9.6 oz (66 kg)   17 145 lb 12.8 oz (66.1 kg)   10/30/17 144 lb (65.3 kg)              Today, you had the following     No orders found for display       Primary Care Provider Office Phone # Fax #    Jaleesa Carlitarijoseph SinghCarlos A, APRN [phone]       [address]  [facility] [address]  [facility] [address]        Equal Access to Services     CORI BUSH : Hadii aad ku hadasho Soomaali, waaxda luqadaha, qaybta kaalmada adeegyada, waxay idiin hayaan sydney vines la'samanta . So [facility] [phone].    ATENCIÓN: Si habla español, tiene a bailon disposición servicios gratuitos de asistencia lingüística. Llame al [phone].    We comply with applicable federal civil rights laws and [place] laws. We do not discriminate on the basis of race, color, national origin, age, disability, sex, sexual orientation, or gender identity.            Thank you!     Thank you for choosing [facility]  for your care. Our goal is always to provide you with excellent care. Hearing back from our patients is one way we can continue to improve our services. Please take a few minutes to complete the written survey that you may receive in the mail after your visit with us. Thank you!             Your Updated Medication List - Protect others around you: Learn how to safely use, store and " throw away your medicines at www.disposemymeds.org.          This list is accurate as of: 12/13/17  9:57 AM.  Always use your most recent med list.                   Brand Name Dispense Instructions for use Diagnosis    ACETAMINOPHEN PO      Take 650 mg by mouth 3 times daily        ammonium lactate 12 % lotion    LAC-HYDRIN     Apply topically 2 times daily Apply to All extremities topically every day and evening shift for Dry Skin        APRESOLINE PO      Take 10 mg by mouth 3 times daily        ARTIFICIAL TEAR OP      Place 1 drop into both eyes 4 times daily ; and instill 1 drop in both eyes PRN        atenolol 25 MG tablet    TENORMIN     Take 25 mg by mouth daily.        DEMADEX PO      Take 2.5 mg by mouth daily        gabapentin 100 MG capsule    NEURONTIN     Take 300 mg by mouth 2 times daily        multivitamin Tabs tablet      Take 1 tablet by mouth daily        OXYCODONE HCL PO      Take 2.5 mg by mouth 3 times daily 6-12-6        polyethylene glycol Packet    MIRALAX/GLYCOLAX     Take 1 packet by mouth daily        potassium chloride 20 MEQ Packet    KLOR-CON     Take 20 mEq by mouth daily        VITAMIN D (CHOLECALCIFEROL) PO      Take 1,000 Units by mouth daily

## 2017-12-13 NOTE — PROGRESS NOTES
[place] [facility]    Chief Complaint   Patient presents with     [facility] Regulatory       HPI:    Celeste Hawthorne is a 89 year old  (7/30/1915), who is being seen today for a federally mandated E/M visit at [facility].  HPI information obtained from: facility chart records. Today's report:    She has a trigger finger.  She has an appointment for injection with Dr. Slater this afternoon.    Occasional elevated bp.    ALLERGIES: Amoxicillin  PAST MEDICAL HISTORY:  has a past medical history of Anemia; Anemia due to blood loss, acute (5/14/2012); Breast cancer (H); Constipation; History of blood transfusion; Hypertension; Left shoulder pain; Macular degeneration; and Malignant neoplasm (H) (1981).  PAST SURGICAL HISTORY:  has a past surgical history that includes Esophagoscopy (5/11/2012); Breast surgery (1981); Open reduction internal fixation femur distal (5/8/2012); Eye surgery; hemorrhoidectomy; Repair esophageal stricture; and Reverse arthroplasty shoulder (Left, 8/19/2014).  FAMILY HISTORY: family history includes Other Cancer in her son.  SOCIAL HISTORY:  reports that she has never smoked. She has never used smokeless tobacco. She reports that she drinks alcohol. She reports that she does not use illicit drugs.    MEDICATIONS:  Current Outpatient Prescriptions   Medication Sig Dispense Refill     HydrALAZINE HCl (APRESOLINE PO) Take 10 mg by mouth 3 times daily        ACETAMINOPHEN PO Take 650 mg by mouth 3 times daily       ARTIFICIAL TEAR OP Place 1 drop into both eyes 4 times daily ; and instill 1 drop in both eyes PRN       Torsemide (DEMADEX PO) Take 2.5 mg by mouth daily       potassium chloride (KLOR-CON) 20 MEQ Packet Take 20 mEq by mouth daily       ammonium lactate (LAC-HYDRIN) 12 % lotion Apply topically 2 times daily Apply to All extremities topically every day and evening shift for Dry Skin       OXYCODONE HCL PO Take 2.5 mg by mouth 3 times daily 6-12-6    "    polyethylene glycol (MIRALAX/GLYCOLAX) packet Take 1 packet by mouth daily       gabapentin (NEURONTIN) 100 MG capsule Take 300 mg by mouth 2 times daily        VITAMIN D, CHOLECALCIFEROL, PO Take 1,000 Units by mouth daily       multivitamin (OCUVITE) TABS Take 1 tablet by mouth daily       atenolol (TENORMIN) 25 MG tablet Take 25 mg by mouth daily.       Medications reviewed:  Medications reconciled to facility chart and changes were made to reflect current medications as identified as above med list. Below are the changes that were made:   Medications stopped since last EPIC medication reconciliation:   There are no discontinued medications.    Medications started since last [facility] medication reconciliation:  No orders of the defined types were placed in this encounter.        ROS:  HEENT: reports she believes she has a cold. Coughing and spitting up stuff.   CV:  No palpitations.  RESPIRATORY: denies shortness of breath. Notes cough.  GI:  Bowels working too well now. Had been constipated earlier, believes too much laxative now.  MUSCULOSKELETAL: denies pain other than discomfort with her trigger finger.      Exam:  Vitals: BP (!) 156/94  Pulse 88  Temp 98.9  F (37.2  C)  Resp 16  Ht 5' 4\" (1.626 m)  Wt 145 lb 9.6 oz (66 kg)  SpO2 95%  BMI 24.99 kg/m2  BMI= Body mass index is 24.99 kg/(m^2).     Reviewed blood pressures.     GENERAL APPEARANCE:  Alert, in no distress  ENT:  Mucus membranes moist  NECK:  No adenopathy,masses or thyromegaly  RESP:  lungs clear to auscultation, but distant breath sounds.   CV:  Regular rate and rhythm.  ABDOMEN:  soft, nontender  M/S:   She does have edema  She does have deformities of fingers.  PSYCH:  memory impaired , affect and mood normal      BP 12/05-12/12: 118-160/69-94 mmHg    Lab/Diagnostic data:    CBC RESULTS:   Recent Labs   Lab Test  11/27/17   0900  07/24/17   0636   12/05/16   0955   09/30/16   1641   WBC   --    --    --   12.9*   --   7.2   RBC   --    -- "    --   3.26*   --   3.28*   HGB  13.3  12.1   < >  10.8*   < >  11.1*   HCT   --    --    --   33.2*   --   34.3*   MCV   --    --    --   102*   --   105*   MCH   --    --    --   33.1*   --   33.8*   MCHC   --    --    --   32.5   --   32.4   RDW   --    --    --   13.1   --   14.7   PLT   --    --    --   147*   --   150    < > = values in this interval not displayed.       Last Basic Metabolic Panel:  Recent Labs   Lab Test  11/27/17   0900  07/24/17   0636   NA  140  143   POTASSIUM  4.5  4.2   CHLORIDE  103  105   HEIDY  9.3  9.3   CO2  33*  32   BUN  23  20   CR  1.26*  1.13*   GLC  88  66*       ASSESSMENT/PLAN  Benign hypertension with CKD (chronic kidney disease) stage III  Will increase apresoline.     Frailty  Continue her supports.     Trigger ring finger of right hand  Will see Hand surgery today; anticipating an injection.    Viral upper respiratory tract infection  Monitor. Jaleesa has not heard anything about this yet. She does appear comfortable.     Dementia without behavioral disturbance, unspecified dementia type  Continue her supports.     Constipation:  Improved with Miralax. She notes she does not want to go back to that; but now is loose. Will decrease dose.    Electronically signed by:  Candie Amaya MD, MD

## 2017-12-19 ENCOUNTER — TELEPHONE (OUTPATIENT)
Dept: FAMILY MEDICINE | Facility: CLINIC | Age: 82
End: 2017-12-19

## 2017-12-19 ENCOUNTER — NURSING HOME VISIT (OUTPATIENT)
Dept: GERIATRICS | Facility: CLINIC | Age: 82
End: 2017-12-19
Payer: COMMERCIAL

## 2017-12-19 VITALS
OXYGEN SATURATION: 95 % | DIASTOLIC BLOOD PRESSURE: 90 MMHG | WEIGHT: 141 LBS | BODY MASS INDEX: 24.07 KG/M2 | HEIGHT: 64 IN | SYSTOLIC BLOOD PRESSURE: 176 MMHG | TEMPERATURE: 98 F | RESPIRATION RATE: 16 BRPM | HEART RATE: 88 BPM

## 2017-12-19 DIAGNOSIS — R06.01 ORTHOPNEA: ICD-10-CM

## 2017-12-19 DIAGNOSIS — R60.9 EDEMA, UNSPECIFIED TYPE: ICD-10-CM

## 2017-12-19 DIAGNOSIS — R05.9 COUGH: ICD-10-CM
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
## 2017-12-19 PROCEDURE — 99309 SBSQ NF CARE MODERATE MDM 30: CPT | Performed by: NURSE PRACTITIONER

## 2017-12-19 RX ORDER — WARFARIN SODIUM 2 MG/1
TABLET ORAL
COMMUNITY
End: 2018-06-12

## 2017-12-19 NOTE — PROGRESS NOTES
"[place]    Chief Complaint   Patient presents with     Nursing Home Acute     Fall     Cough       HPI:    Celeste Hawthorne is a 89 year old  (7/30/1915), who is being seen today for an episodic care visit at [facility].  HPI information obtained from: facility chart records.Today's concern is:  Fall  Per nursing and patient daughter patient fell to floor from w/c and hit head. Daughter reports feet were on floor when being pushed in w/c and this is how patient fell. Per NN blanket got stuck in w/c. Now has bruise left forehead and scabbed scratch on nose. Denies headache/pain. Neuros baseline    Cough  Orthopnea  - Patient reports cough \"for a while now\". Reports some SOB and notes it especially at night - states gets relief when \"I sit up.\" Reports coughing up clear phlegm. Afebrile    Edema  - reports this is chronic but is currently worse than usual. States usually wears compression socks but does not have on today. Weights 141 - 145 since October.      ALLERGIES: Amoxicillin  Past Medical, Surgical, Family and Social History reviewed and updated in [facility].    Current Outpatient Prescriptions   Medication Sig Dispense Refill     HydrALAZINE HCl (APRESOLINE PO) Take 15 mg by mouth 3 times daily        ACETAMINOPHEN PO Take 650 mg by mouth 3 times daily       ARTIFICIAL TEAR OP Place 1 drop into both eyes 4 times daily ; and instill 1 drop in both eyes PRN       Torsemide (DEMADEX PO) Take 2.5 mg by mouth daily       potassium chloride (KLOR-CON) 20 MEQ Packet Take 20 mEq by mouth daily       ammonium lactate (LAC-HYDRIN) 12 % lotion Apply topically 2 times daily Apply to All extremities topically every day and evening shift for Dry Skin       OXYCODONE HCL PO Take 2.5 mg by mouth 3 times daily 6-12-6       polyethylene glycol (MIRALAX/GLYCOLAX) packet Take 1 packet by mouth daily       gabapentin (NEURONTIN) 100 MG capsule Take 300 mg by mouth 2 times daily        " "VITAMIN D, CHOLECALCIFEROL, PO Take 1,000 Units by mouth daily       multivitamin (OCUVITE) TABS Take 1 tablet by mouth daily       atenolol (TENORMIN) 25 MG tablet Take 25 mg by mouth daily.       Medications reviewed:  Medications reconciled to facility chart and changes were made to reflect current medications as identified as above med list. Below are the changes that were made:   Medications stopped since last EPIC medication reconciliation:   There are no discontinued medications.    Medications started since last [facility] medication reconciliation:  No orders of the defined types were placed in this encounter.        REVIEW OF SYSTEMS:  4 point ROS including Respiratory, CV, GI and , other than that noted in the HPI,  is negative    Physical Exam:  /90  Pulse 88  Temp 98  F (36.7  C)  Resp 16  Ht 5' 4\" (1.626 m)  Wt 141 lb (64 kg)  SpO2 95%  BMI 24.2 kg/m2    Resp: Effort WNL, LSCTA   CV: S1-2, no S3-4, no murmurs noted- 2+ edema > right with localized collection to right calf and tenderness to leg- no discoloration noted  Abd- soft, nontender, BS +  Musc- DAVE  Psych- alert, calm, pleasant      BP 12/12-12/19: /67-94 mmHg    Recent Labs:    CBC RESULTS:   Recent Labs   Lab Test  11/27/17   0900 07/24/17   0636   12/05/16   0955   09/30/16   1641   WBC   --    --    --   12.9*   --   7.2   RBC   --    --    --   3.26*   --   3.28*   HGB  13.3  12.1   < >  10.8*   < >  11.1*   HCT   --    --    --   33.2*   --   34.3*   MCV   --    --    --   102*   --   105*   MCH   --    --    --   33.1*   --   33.8*   MCHC   --    --    --   32.5   --   32.4   RDW   --    --    --   13.1   --   14.7   PLT   --    --    --   147*   --   150    < > = values in this interval not displayed.       Last Basic Metabolic Panel:  Recent Labs   Lab Test  11/27/17   0900 07/24/17   0636   NA  140  143   POTASSIUM  4.5  4.2   CHLORIDE  103  105   HEIDY  9.3  9.3   CO2  33*  32   BUN  23  20   CR  1.26*  1.13*   GLC  88  " 66*       Assessment/Plan:  Fall  - neuros stable  - get foot rests for chair  - monitor bruise/scratch until healed    Cough  Orthopnea  - lung sounds clear/CXR-obtained and  revealed nonspecific shadowed left perihilar, no cardiomegaly or effusion noted  - elevate HOB at night  - follow clinically    Edema, unspecified type  - increased and > right  - venous US obtained and revealed DVT right common and mid femoral vein (Discussed with Dr. Amaya)    ---extensive discussion with daughter Candy risks/benefits anticoagulation -goals of care are comfort. Candy called her sisters and they have decided given their mother's current mentation and ana paula in living they would like to anticoagulate in NH understanding the risks of bleeding given age and fall risk.     - Lovenox 40 mg SC q 12 until INR > = 1.8 - first dose now (> 75 and CKD- 0.75mg/kg q 12)  - Coumadin 2 mg daily    INR and CBC 12/21              Electronically signed by  ANN MARIE Blunt CNP

## 2017-12-19 NOTE — TELEPHONE ENCOUNTER
Received call from Radiology with results of ultrasound.  There is clot in the common femoral vein and mid femoral vein.    Did get Oliver; we discussed. She will discuss with family.

## 2017-12-21 ENCOUNTER — HOSPITAL LABORATORY (OUTPATIENT)
Dept: OTHER | Facility: CLINIC | Age: 82
End: 2017-12-21

## 2017-12-21 LAB
BASOPHILS # BLD AUTO: 0 10E9/L (ref 0–0.2)
BASOPHILS NFR BLD AUTO: 0.4 %
DIFFERENTIAL METHOD BLD: ABNORMAL
EOSINOPHIL # BLD AUTO: 0.2 10E9/L (ref 0–0.7)
EOSINOPHIL NFR BLD AUTO: 4.2 %
ERYTHROCYTE [DISTWIDTH] IN BLOOD BY AUTOMATED COUNT: 14 % (ref 10–15)
HCT VFR BLD AUTO: 36.5 % (ref 35–47)
HGB BLD-MCNC: 11.8 G/DL (ref 11.7–15.7)
IMM GRANULOCYTES # BLD: 0 10E9/L (ref 0–0.4)
IMM GRANULOCYTES NFR BLD: 0.4 %
LYMPHOCYTES # BLD AUTO: 0.8 10E9/L (ref 0.8–5.3)
LYMPHOCYTES NFR BLD AUTO: 15.5 %
MCH RBC QN AUTO: 31.8 PG (ref 26.5–33)
MCHC RBC AUTO-ENTMCNC: 32.3 G/DL (ref 31.5–36.5)
MCV RBC AUTO: 98 FL (ref 78–100)
MONOCYTES # BLD AUTO: 0.5 10E9/L (ref 0–1.3)
MONOCYTES NFR BLD AUTO: 10.4 %
NEUTROPHILS # BLD AUTO: 3.4 10E9/L (ref 1.6–8.3)
NEUTROPHILS NFR BLD AUTO: 69.1 %
NRBC # BLD AUTO: 0 10*3/UL
NRBC BLD AUTO-RTO: 0 /100
PLATELET # BLD AUTO: 208 10E9/L (ref 150–450)
RBC # BLD AUTO: 3.71 10E12/L (ref 3.8–5.2)
WBC # BLD AUTO: 5 10E9/L (ref 4–11)

## 2017-12-22 ENCOUNTER — NURSING HOME VISIT (OUTPATIENT)
Dept: GERIATRICS | Facility: CLINIC | Age: 82
End: 2017-12-22
Payer: COMMERCIAL

## 2017-12-22 VITALS
BODY MASS INDEX: 24.07 KG/M2 | HEIGHT: 64 IN | HEART RATE: 88 BPM | RESPIRATION RATE: 16 BRPM | WEIGHT: 141 LBS | SYSTOLIC BLOOD PRESSURE: 134 MMHG | TEMPERATURE: 98.4 F | DIASTOLIC BLOOD PRESSURE: 77 MMHG | OXYGEN SATURATION: 93 %

## 2017-12-22 DIAGNOSIS — R05.9 COUGH: ICD-10-CM

## 2017-12-22 DIAGNOSIS — R60.9 EDEMA, UNSPECIFIED TYPE: ICD-10-CM

## 2017-12-22 DIAGNOSIS — I82.411 ACUTE DEEP VEIN THROMBOSIS (DVT) OF FEMORAL VEIN OF RIGHT LOWER EXTREMITY (H): Primary | ICD-10-CM

## 2017-12-22 PROCEDURE — 99309 SBSQ NF CARE MODERATE MDM 30: CPT | Performed by: NURSE PRACTITIONER

## 2017-12-22 NOTE — PROGRESS NOTES
"[place] [facility]    Chief Complaint   Patient presents with     Nursing Home Acute     Deep Vein Thrombosis     Cough       HPI:    Celeste Hawthorne is a 89 year old  (7/30/1915), who is being seen today for an episodic care visit at [facility].  HPI information obtained from: facility chart records.Today's concern is:  DVT (deep venous thrombosis) (H)  - right LE DVT right common and mid femoral vein (Discussed with Dr. Amaya)- new on Lovenox and warfarin as of 12/19. Tolerating thus far with no evidence of bleeding. INR 1.0- currently 2mg, 2mg and will get 5 mg tonight and tomorrow with INR 12/24    Cough  - \"somewhat less'  Reports is sleeping better with HOB up. Denies SOB at this time. VS reviewed    Edema, unspecified type  - improved to LE- Does not have compression on.      ALLERGIES: Amoxicillin  Past Medical, Surgical, Family and Social History reviewed and updated in [facility].    Current Outpatient Prescriptions   Medication Sig Dispense Refill     enoxaparin (LOVENOX) 40 MG/0.4ML injection Inject 40 mg Subcutaneous every 12 hours Until INR >= 1.8       HydrALAZINE HCl (APRESOLINE PO) Take 15 mg by mouth 3 times daily        ACETAMINOPHEN PO Take 650 mg by mouth 3 times daily       ARTIFICIAL TEAR OP Place 1 drop into both eyes 4 times daily ; and instill 1 drop in both eyes PRN       Torsemide (DEMADEX PO) Take 2.5 mg by mouth daily       potassium chloride (KLOR-CON) 20 MEQ Packet Take 20 mEq by mouth daily       ammonium lactate (LAC-HYDRIN) 12 % lotion Apply topically 2 times daily Apply to All extremities topically every day and evening shift for Dry Skin       OXYCODONE HCL PO Take 2.5 mg by mouth 3 times daily 6-12-6       polyethylene glycol (MIRALAX/GLYCOLAX) packet Take 1 packet by mouth daily       gabapentin (NEURONTIN) 100 MG capsule Take 300 mg by mouth 2 times daily        VITAMIN D, CHOLECALCIFEROL, PO Take 1,000 Units by mouth daily       multivitamin " "(OCUVITE) TABS Take 1 tablet by mouth daily       atenolol (TENORMIN) 25 MG tablet Take 25 mg by mouth daily.       warfarin (COUMADIN) 2 MG tablet Take 2 mg by mouth daily Until 12/21; check INR 12/22       Medications reviewed:  Medications reconciled to facility chart and changes were made to reflect current medications as identified as above med list. Below are the changes that were made:   Medications stopped since last EPIC medication reconciliation:   There are no discontinued medications.    Medications started since last [facility] medication reconciliation:  No orders of the defined types were placed in this encounter.        REVIEW OF SYSTEMS:  4 point ROS including Respiratory, CV, GI and , other than that noted in the HPI,  is negative    Physical Exam:  /77  Pulse 88  Temp 98.4  F (36.9  C)  Resp 16  Ht 5' 4\" (1.626 m)  Wt 141 lb (64 kg)  SpO2 93%  BMI 24.2 kg/m2    Resp: Effort WNL, LSCTA   CV: S1-2, no S3-4, no murmurs noted- 2+ edema > right with localized collection to right calf and tenderness to leg- no discoloration noted  Abd- soft, nontender, BS +  Musc- DAVE  Psych- alert, calm, pleasant      BP 12/12-12/19: /67-94 mmHg    Recent Labs:    CBC RESULTS:   Recent Labs   Lab Test  11/27/17   0900 07/24/17   0636   12/05/16   0955   09/30/16   1641   WBC   --    --    --   12.9*   --   7.2   RBC   --    --    --   3.26*   --   3.28*   HGB  13.3  12.1   < >  10.8*   < >  11.1*   HCT   --    --    --   33.2*   --   34.3*   MCV   --    --    --   102*   --   105*   MCH   --    --    --   33.1*   --   33.8*   MCHC   --    --    --   32.5   --   32.4   RDW   --    --    --   13.1   --   14.7   PLT   --    --    --   147*   --   150    < > = values in this interval not displayed.       Last Basic Metabolic Panel:  Recent Labs   Lab Test  11/27/17   0900 07/24/17   0636   NA  140  143   POTASSIUM  4.5  4.2   CHLORIDE  103  105   HEIDY  9.3  9.3   CO2  33*  32   BUN  23  20   CR  1.26*  " 1.13*   [facility]  88  66*       Assessment/Plan:  DVT (deep venous thrombosis) (H)  - continue on Coumadin 5 mg daily, INR sched for 12/24, Lovenox until INR > = 1.8  - observe for s/s bleeding    Cough  - cold is going around. Afebrile and seems to be imroving  - supportive tx, rest/fluids and follow clinically      Edema, unspecified type  - cont to recommend elevation and compression stockings  - follow weights.          Electronically signed by  ANN MARIE Blunt CNP

## 2017-12-28 ENCOUNTER — NURSING HOME VISIT (OUTPATIENT)
Dept: GERIATRICS | Facility: CLINIC | Age: 82
End: 2017-12-28
Payer: COMMERCIAL

## 2017-12-28 VITALS
OXYGEN SATURATION: 95 % | BODY MASS INDEX: 23.66 KG/M2 | HEIGHT: 64 IN | TEMPERATURE: 98.5 F | HEART RATE: 82 BPM | WEIGHT: 138.6 LBS | SYSTOLIC BLOOD PRESSURE: 132 MMHG | RESPIRATION RATE: 16 BRPM | DIASTOLIC BLOOD PRESSURE: 70 MMHG

## 2017-12-28 DIAGNOSIS — Z79.01 LONG-TERM (CURRENT) USE OF ANTICOAGULANTS: ICD-10-CM

## 2017-12-28 DIAGNOSIS — I82.411 ACUTE DEEP VEIN THROMBOSIS (DVT) OF FEMORAL VEIN OF RIGHT LOWER EXTREMITY (H): ICD-10-CM

## 2017-12-28 DIAGNOSIS — Z51.81 ENCOUNTER FOR THERAPEUTIC DRUG MONITORING: Primary | ICD-10-CM

## 2017-12-28 PROCEDURE — 99308 SBSQ NF CARE LOW MDM 20: CPT | Performed by: NURSE PRACTITIONER

## 2017-12-28 NOTE — PROGRESS NOTES
[place] [facility]    HPI:    Celeste Hawthorne is a 89 year old  (7/30/1915), who is being seen today for an episodic care visit at [facility].   HPI information obtained from: facility chart records. Today's concern is INR/Coumadin management for DVT    Bleeding Signs/Symptoms:  None  Thromboembolic Signs/Symptoms: RLE edema     Medication Changes:  No  Dietary Changes:  No  Activity Changes: No  Bacterial/Viral Infection:  No    Missed Coumadin Doses:  None    On ASA: No    Other Concerns:  No    OBJECTIVE:    INR Today:  1.8  Current Dose:  3mg    ASSESSMENT:     Encounter for therapeutic drug monitoring  Long-term (current) use of anticoagulants  Acute deep vein thrombosis (DVT) of femoral vein of right lower extremity (H)     Therapeutic INR for goal of 1.8-2.5    PLAN:    New Dose: No Change      Next INR: 12/29        ANN MARIE Blunt CNP

## 2017-12-29 ENCOUNTER — NURSING HOME VISIT (OUTPATIENT)
Dept: GERIATRICS | Facility: CLINIC | Age: 82
End: 2017-12-29
Payer: COMMERCIAL

## 2017-12-29 VITALS
DIASTOLIC BLOOD PRESSURE: 97 MMHG | WEIGHT: 138.6 LBS | RESPIRATION RATE: 16 BRPM | TEMPERATURE: 98.5 F | HEIGHT: 64 IN | OXYGEN SATURATION: 95 % | SYSTOLIC BLOOD PRESSURE: 171 MMHG | HEART RATE: 87 BPM | BODY MASS INDEX: 23.66 KG/M2

## 2017-12-29 DIAGNOSIS — Z51.81 ENCOUNTER FOR THERAPEUTIC DRUG MONITORING: Primary | ICD-10-CM

## 2017-12-29 DIAGNOSIS — I82.411 ACUTE DEEP VEIN THROMBOSIS (DVT) OF FEMORAL VEIN OF RIGHT LOWER EXTREMITY (H): ICD-10-CM

## 2017-12-29 DIAGNOSIS — Z79.01 LONG-TERM (CURRENT) USE OF ANTICOAGULANTS: ICD-10-CM

## 2017-12-29 PROCEDURE — 99308 SBSQ NF CARE LOW MDM 20: CPT | Performed by: NURSE PRACTITIONER

## 2017-12-29 NOTE — PROGRESS NOTES
[place] [facility]    HPI:    Celeste Hawthorne is a 89 year old  (7/30/1915), who is being seen today for an episodic care visit at [facility].   HPI information obtained from: facility chart records. Today's concern is INR/Coumadin management for DVT    Bleeding Signs/Symptoms:  None  Thromboembolic Signs/Symptoms:  RLE edema    Medication Changes:  No  Dietary Changes:  No  Activity Changes: No  Bacterial/Viral Infection:  No    Missed Coumadin Doses:  None    On ASA: No    Other Concerns:  No    OBJECTIVE:    INR Today:  1.8  Current Dose:  3mg    ASSESSMENT:     Encounter for therapeutic drug monitoring  Long-term (current) use of anticoagulants  Acute deep vein thrombosis (DVT) of femoral vein of right lower extremity (H)     Therapeutic INR for goal of 1.8-2.5    PLAN:    New Dose: No Change      Next INR: 1/3   ( D/c Lovenox)        ANN MARIE Blunt CNP

## 2018-01-01 ENCOUNTER — NURSING HOME VISIT (OUTPATIENT)
Dept: GERIATRICS | Facility: CLINIC | Age: 83
End: 2018-01-01
Payer: COMMERCIAL

## 2018-01-01 ENCOUNTER — HOSPITAL LABORATORY (OUTPATIENT)
Dept: OTHER | Facility: CLINIC | Age: 83
End: 2018-01-01

## 2018-01-01 ENCOUNTER — APPOINTMENT (OUTPATIENT)
Dept: GENERAL RADIOLOGY | Facility: CLINIC | Age: 83
DRG: 194 | End: 2018-01-01
Attending: EMERGENCY MEDICINE
Payer: COMMERCIAL

## 2018-01-01 ENCOUNTER — HOSPITAL ENCOUNTER (INPATIENT)
Facility: CLINIC | Age: 83
LOS: 1 days | Discharge: NURSING, REHAB OR LONG-TERM CARE | DRG: 194 | End: 2018-08-15
Attending: EMERGENCY MEDICINE | Admitting: INTERNAL MEDICINE
Payer: COMMERCIAL

## 2018-01-01 ENCOUNTER — APPOINTMENT (OUTPATIENT)
Dept: SPEECH THERAPY | Facility: CLINIC | Age: 83
DRG: 194 | End: 2018-01-01
Attending: INTERNAL MEDICINE
Payer: COMMERCIAL

## 2018-01-01 ENCOUNTER — APPOINTMENT (OUTPATIENT)
Dept: CT IMAGING | Facility: CLINIC | Age: 83
DRG: 194 | End: 2018-01-01
Attending: EMERGENCY MEDICINE
Payer: COMMERCIAL

## 2018-01-01 VITALS
OXYGEN SATURATION: 96 % | RESPIRATION RATE: 18 BRPM | DIASTOLIC BLOOD PRESSURE: 78 MMHG | BODY MASS INDEX: 24.11 KG/M2 | HEART RATE: 84 BPM | WEIGHT: 141.2 LBS | TEMPERATURE: 98 F | SYSTOLIC BLOOD PRESSURE: 123 MMHG | HEIGHT: 64 IN

## 2018-01-01 VITALS
DIASTOLIC BLOOD PRESSURE: 64 MMHG | TEMPERATURE: 97.8 F | SYSTOLIC BLOOD PRESSURE: 106 MMHG | HEART RATE: 82 BPM | OXYGEN SATURATION: 94 % | BODY MASS INDEX: 24.62 KG/M2 | RESPIRATION RATE: 16 BRPM | HEIGHT: 64 IN | WEIGHT: 144.2 LBS

## 2018-01-01 VITALS
SYSTOLIC BLOOD PRESSURE: 140 MMHG | DIASTOLIC BLOOD PRESSURE: 80 MMHG | HEIGHT: 64 IN | WEIGHT: 143.8 LBS | HEART RATE: 70 BPM | TEMPERATURE: 98 F | OXYGEN SATURATION: 92 % | RESPIRATION RATE: 16 BRPM | BODY MASS INDEX: 24.55 KG/M2

## 2018-01-01 VITALS
SYSTOLIC BLOOD PRESSURE: 147 MMHG | RESPIRATION RATE: 18 BRPM | OXYGEN SATURATION: 95 % | TEMPERATURE: 97.8 F | HEART RATE: 86 BPM | HEIGHT: 64 IN | DIASTOLIC BLOOD PRESSURE: 73 MMHG | WEIGHT: 149 LBS | BODY MASS INDEX: 25.44 KG/M2

## 2018-01-01 VITALS
BODY MASS INDEX: 25.44 KG/M2 | RESPIRATION RATE: 19 BRPM | DIASTOLIC BLOOD PRESSURE: 78 MMHG | HEART RATE: 88 BPM | HEIGHT: 64 IN | SYSTOLIC BLOOD PRESSURE: 152 MMHG | OXYGEN SATURATION: 96 % | WEIGHT: 149 LBS | TEMPERATURE: 98 F

## 2018-01-01 VITALS
HEIGHT: 64 IN | OXYGEN SATURATION: 93 % | BODY MASS INDEX: 24.14 KG/M2 | RESPIRATION RATE: 16 BRPM | SYSTOLIC BLOOD PRESSURE: 134 MMHG | DIASTOLIC BLOOD PRESSURE: 70 MMHG | TEMPERATURE: 98 F | WEIGHT: 141.4 LBS | HEART RATE: 84 BPM

## 2018-01-01 VITALS
RESPIRATION RATE: 16 BRPM | WEIGHT: 146.6 LBS | OXYGEN SATURATION: 96 % | HEART RATE: 68 BPM | SYSTOLIC BLOOD PRESSURE: 159 MMHG | DIASTOLIC BLOOD PRESSURE: 83 MMHG | HEIGHT: 64 IN | TEMPERATURE: 98 F | BODY MASS INDEX: 25.03 KG/M2

## 2018-01-01 VITALS
RESPIRATION RATE: 16 BRPM | DIASTOLIC BLOOD PRESSURE: 70 MMHG | TEMPERATURE: 97.5 F | SYSTOLIC BLOOD PRESSURE: 128 MMHG | HEART RATE: 80 BPM | WEIGHT: 142.6 LBS | OXYGEN SATURATION: 94 % | HEIGHT: 64 IN | BODY MASS INDEX: 24.34 KG/M2

## 2018-01-01 VITALS
TEMPERATURE: 98.6 F | SYSTOLIC BLOOD PRESSURE: 140 MMHG | HEART RATE: 89 BPM | BODY MASS INDEX: 25.45 KG/M2 | DIASTOLIC BLOOD PRESSURE: 69 MMHG | WEIGHT: 149.1 LBS | HEIGHT: 64 IN | RESPIRATION RATE: 16 BRPM | OXYGEN SATURATION: 97 %

## 2018-01-01 DIAGNOSIS — R54 FRAILTY: ICD-10-CM

## 2018-01-01 DIAGNOSIS — Z79.01 LONG-TERM (CURRENT) USE OF ANTICOAGULANTS: ICD-10-CM

## 2018-01-01 DIAGNOSIS — Z86.718 PERSONAL HISTORY OF DVT (DEEP VEIN THROMBOSIS): ICD-10-CM

## 2018-01-01 DIAGNOSIS — I27.0 PRIMARY PULMONARY HYPERTENSION (H): ICD-10-CM

## 2018-01-01 DIAGNOSIS — F03.90 DEMENTIA WITHOUT BEHAVIORAL DISTURBANCE, UNSPECIFIED DEMENTIA TYPE: ICD-10-CM

## 2018-01-01 DIAGNOSIS — R60.0 BILATERAL LEG EDEMA: ICD-10-CM

## 2018-01-01 DIAGNOSIS — Z86.718 HISTORY OF DEEP VENOUS THROMBOSIS: ICD-10-CM

## 2018-01-01 DIAGNOSIS — G89.29 OTHER CHRONIC PAIN: Primary | ICD-10-CM

## 2018-01-01 DIAGNOSIS — S09.90XA CLOSED HEAD INJURY, INITIAL ENCOUNTER: ICD-10-CM

## 2018-01-01 DIAGNOSIS — Z51.81 ENCOUNTER FOR THERAPEUTIC DRUG MONITORING: Primary | ICD-10-CM

## 2018-01-01 DIAGNOSIS — N18.30 BENIGN HYPERTENSION WITH CKD (CHRONIC KIDNEY DISEASE) STAGE III (H): ICD-10-CM

## 2018-01-01 DIAGNOSIS — N18.30 CKD (CHRONIC KIDNEY DISEASE) STAGE 3, GFR 30-59 ML/MIN (H): ICD-10-CM

## 2018-01-01 DIAGNOSIS — G89.29 OTHER CHRONIC PAIN: ICD-10-CM

## 2018-01-01 DIAGNOSIS — J18.9 PNEUMONIA OF RIGHT LUNG DUE TO INFECTIOUS ORGANISM, UNSPECIFIED PART OF LUNG: ICD-10-CM

## 2018-01-01 DIAGNOSIS — I50.43 ACUTE ON CHRONIC COMBINED SYSTOLIC AND DIASTOLIC CONGESTIVE HEART FAILURE (H): Primary | ICD-10-CM

## 2018-01-01 DIAGNOSIS — D53.9 MACROCYTIC ANEMIA: ICD-10-CM

## 2018-01-01 DIAGNOSIS — J18.9 PNEUMONIA OF BOTH LOWER LOBES DUE TO INFECTIOUS ORGANISM: Primary | ICD-10-CM

## 2018-01-01 DIAGNOSIS — Z51.81 ENCOUNTER FOR THERAPEUTIC DRUG MONITORING: ICD-10-CM

## 2018-01-01 DIAGNOSIS — M25.512 LEFT SHOULDER PAIN, UNSPECIFIED CHRONICITY: ICD-10-CM

## 2018-01-01 DIAGNOSIS — I35.8 AORTIC VALVE SCLEROSIS: ICD-10-CM

## 2018-01-01 DIAGNOSIS — I12.9 BENIGN HYPERTENSION WITH CKD (CHRONIC KIDNEY DISEASE) STAGE III (H): ICD-10-CM

## 2018-01-01 DIAGNOSIS — I82.4Y1 DVT, LOWER EXTREMITY, PROXIMAL, ACUTE, RIGHT (H): ICD-10-CM

## 2018-01-01 DIAGNOSIS — J18.9 PNEUMONIA OF BOTH LOWER LOBES DUE TO INFECTIOUS ORGANISM: ICD-10-CM

## 2018-01-01 DIAGNOSIS — S40.019A CONTUSION OF SHOULDER, UNSPECIFIED LATERALITY, INITIAL ENCOUNTER: ICD-10-CM

## 2018-01-01 DIAGNOSIS — M15.0 PRIMARY OSTEOARTHRITIS INVOLVING MULTIPLE JOINTS: Primary | ICD-10-CM

## 2018-01-01 DIAGNOSIS — S60.512A ABRASION OF LEFT HAND, INITIAL ENCOUNTER: ICD-10-CM

## 2018-01-01 DIAGNOSIS — I82.409 RECURRENT DEEP VEIN THROMBOSIS (DVT) (H): ICD-10-CM

## 2018-01-01 DIAGNOSIS — D53.9 ANEMIA, MACROCYTIC: ICD-10-CM

## 2018-01-01 DIAGNOSIS — D64.9 ANEMIA, UNSPECIFIED TYPE: ICD-10-CM

## 2018-01-01 DIAGNOSIS — I50.43 ACUTE ON CHRONIC COMBINED SYSTOLIC AND DIASTOLIC CONGESTIVE HEART FAILURE (H): ICD-10-CM

## 2018-01-01 LAB
ALBUMIN SERPL-MCNC: 2.5 G/DL (ref 3.4–5)
ALBUMIN SERPL-MCNC: 2.7 G/DL (ref 3.4–5)
ALBUMIN UR-MCNC: 30 MG/DL
ALP SERPL-CCNC: 58 U/L (ref 40–150)
ALP SERPL-CCNC: 61 U/L (ref 40–150)
ALT SERPL W P-5'-P-CCNC: 12 U/L (ref 0–50)
ALT SERPL W P-5'-P-CCNC: 13 U/L (ref 0–50)
ANION GAP SERPL CALCULATED.3IONS-SCNC: 2 MMOL/L (ref 3–14)
ANION GAP SERPL CALCULATED.3IONS-SCNC: 3 MMOL/L (ref 3–14)
ANION GAP SERPL CALCULATED.3IONS-SCNC: 3 MMOL/L (ref 3–14)
ANION GAP SERPL CALCULATED.3IONS-SCNC: 4 MMOL/L (ref 3–14)
ANION GAP SERPL CALCULATED.3IONS-SCNC: 7 MMOL/L (ref 3–14)
APPEARANCE UR: CLEAR
AST SERPL W P-5'-P-CCNC: 13 U/L (ref 0–45)
AST SERPL W P-5'-P-CCNC: 15 U/L (ref 0–45)
BACTERIA SPEC CULT: NO GROWTH
BACTERIA SPEC CULT: NO GROWTH
BACTERIA SPEC CULT: NORMAL
BASOPHILS # BLD AUTO: 0 10E9/L (ref 0–0.2)
BASOPHILS # BLD AUTO: 0 10E9/L (ref 0–0.2)
BASOPHILS NFR BLD AUTO: 0.2 %
BASOPHILS NFR BLD AUTO: 0.2 %
BILIRUB SERPL-MCNC: 0.4 MG/DL (ref 0.2–1.3)
BILIRUB SERPL-MCNC: 0.5 MG/DL (ref 0.2–1.3)
BILIRUB UR QL STRIP: NEGATIVE
BUN SERPL-MCNC: 20 MG/DL (ref 7–30)
BUN SERPL-MCNC: 27 MG/DL (ref 7–30)
BUN SERPL-MCNC: 28 MG/DL (ref 7–30)
BUN SERPL-MCNC: 29 MG/DL (ref 7–30)
BUN SERPL-MCNC: 29 MG/DL (ref 7–30)
CALCIUM SERPL-MCNC: 8.5 MG/DL (ref 8.5–10.1)
CALCIUM SERPL-MCNC: 8.6 MG/DL (ref 8.5–10.1)
CALCIUM SERPL-MCNC: 8.9 MG/DL (ref 8.5–10.1)
CALCIUM SERPL-MCNC: 8.9 MG/DL (ref 8.5–10.1)
CALCIUM SERPL-MCNC: 9.2 MG/DL (ref 8.5–10.1)
CHLORIDE SERPL-SCNC: 102 MMOL/L (ref 94–109)
CHLORIDE SERPL-SCNC: 102 MMOL/L (ref 94–109)
CHLORIDE SERPL-SCNC: 103 MMOL/L (ref 94–109)
CHLORIDE SERPL-SCNC: 98 MMOL/L (ref 94–109)
CHLORIDE SERPL-SCNC: 99 MMOL/L (ref 94–109)
CO2 SERPL-SCNC: 37 MMOL/L (ref 20–32)
CO2 SERPL-SCNC: 38 MMOL/L (ref 20–32)
CO2 SERPL-SCNC: 39 MMOL/L (ref 20–32)
CO2 SERPL-SCNC: 39 MMOL/L (ref 20–32)
CO2 SERPL-SCNC: 41 MMOL/L (ref 20–32)
COLOR UR AUTO: YELLOW
CREAT SERPL-MCNC: 1.09 MG/DL (ref 0.52–1.04)
CREAT SERPL-MCNC: 1.25 MG/DL (ref 0.52–1.04)
CREAT SERPL-MCNC: 1.3 MG/DL (ref 0.52–1.04)
CREAT SERPL-MCNC: 1.53 MG/DL (ref 0.52–1.04)
CREAT SERPL-MCNC: 1.61 MG/DL (ref 0.52–1.04)
DIFFERENTIAL METHOD BLD: ABNORMAL
DIFFERENTIAL METHOD BLD: ABNORMAL
EOSINOPHIL # BLD AUTO: 0 10E9/L (ref 0–0.7)
EOSINOPHIL # BLD AUTO: 0 10E9/L (ref 0–0.7)
EOSINOPHIL NFR BLD AUTO: 0 %
EOSINOPHIL NFR BLD AUTO: 0.1 %
ERYTHROCYTE [DISTWIDTH] IN BLOOD BY AUTOMATED COUNT: 13.9 % (ref 10–15)
ERYTHROCYTE [DISTWIDTH] IN BLOOD BY AUTOMATED COUNT: 13.9 % (ref 10–15)
ERYTHROCYTE [DISTWIDTH] IN BLOOD BY AUTOMATED COUNT: 14 % (ref 10–15)
FOLATE SERPL-MCNC: 10.7 NG/ML
GFR SERPL CREATININE-BSD FRML MDRD: 29 ML/MIN/1.7M2
GFR SERPL CREATININE-BSD FRML MDRD: 31 ML/MIN/1.7M2
GFR SERPL CREATININE-BSD FRML MDRD: 37 ML/MIN/1.7M2
GFR SERPL CREATININE-BSD FRML MDRD: 39 ML/MIN/1.7M2
GFR SERPL CREATININE-BSD FRML MDRD: 46 ML/MIN/1.7M2
GLUCOSE SERPL-MCNC: 105 MG/DL (ref 70–99)
GLUCOSE SERPL-MCNC: 130 MG/DL (ref 70–99)
GLUCOSE SERPL-MCNC: 76 MG/DL (ref 70–99)
GLUCOSE SERPL-MCNC: 80 MG/DL (ref 70–99)
GLUCOSE SERPL-MCNC: 97 MG/DL (ref 70–99)
GLUCOSE UR STRIP-MCNC: NEGATIVE MG/DL
GRAM STN SPEC: NORMAL
HCT VFR BLD AUTO: 27.8 % (ref 35–47)
HCT VFR BLD AUTO: 28.4 % (ref 35–47)
HCT VFR BLD AUTO: 29.2 % (ref 35–47)
HCT VFR BLD AUTO: 29.3 % (ref 35–47)
HCT VFR BLD AUTO: 30.4 % (ref 35–47)
HGB BLD-MCNC: 8.2 G/DL (ref 11.7–15.7)
HGB BLD-MCNC: 8.5 G/DL (ref 11.7–15.7)
HGB BLD-MCNC: 8.8 G/DL (ref 11.7–15.7)
HGB BLD-MCNC: 8.9 G/DL (ref 11.7–15.7)
HGB BLD-MCNC: 9 G/DL (ref 11.7–15.7)
HGB UR QL STRIP: NEGATIVE
IMM GRANULOCYTES # BLD: 0.1 10E9/L (ref 0–0.4)
IMM GRANULOCYTES # BLD: 0.1 10E9/L (ref 0–0.4)
IMM GRANULOCYTES NFR BLD: 0.4 %
IMM GRANULOCYTES NFR BLD: 0.5 %
INR PPP: 2.18 (ref 0.86–1.14)
INR PPP: 2.6 (ref 0.86–1.14)
KETONES UR STRIP-MCNC: NEGATIVE MG/DL
LACTATE BLD-SCNC: 0.8 MMOL/L (ref 0.7–2)
LEUKOCYTE ESTERASE UR QL STRIP: NEGATIVE
LYMPHOCYTES # BLD AUTO: 0.4 10E9/L (ref 0.8–5.3)
LYMPHOCYTES # BLD AUTO: 0.9 10E9/L (ref 0.8–5.3)
LYMPHOCYTES NFR BLD AUTO: 2.4 %
LYMPHOCYTES NFR BLD AUTO: 6.7 %
Lab: NORMAL
MCH RBC QN AUTO: 30.4 PG (ref 26.5–33)
MCH RBC QN AUTO: 31.3 PG (ref 26.5–33)
MCH RBC QN AUTO: 31.4 PG (ref 26.5–33)
MCH RBC QN AUTO: 31.6 PG (ref 26.5–33)
MCH RBC QN AUTO: 31.6 PG (ref 26.5–33)
MCHC RBC AUTO-ENTMCNC: 29.5 G/DL (ref 31.5–36.5)
MCHC RBC AUTO-ENTMCNC: 29.6 G/DL (ref 31.5–36.5)
MCHC RBC AUTO-ENTMCNC: 29.9 G/DL (ref 31.5–36.5)
MCHC RBC AUTO-ENTMCNC: 30.1 G/DL (ref 31.5–36.5)
MCHC RBC AUTO-ENTMCNC: 30.4 G/DL (ref 31.5–36.5)
MCV RBC AUTO: 103 FL (ref 78–100)
MCV RBC AUTO: 104 FL (ref 78–100)
MCV RBC AUTO: 104 FL (ref 78–100)
MCV RBC AUTO: 106 FL (ref 78–100)
MCV RBC AUTO: 107 FL (ref 78–100)
MONOCYTES # BLD AUTO: 0.7 10E9/L (ref 0–1.3)
MONOCYTES # BLD AUTO: 0.9 10E9/L (ref 0–1.3)
MONOCYTES NFR BLD AUTO: 5.1 %
MONOCYTES NFR BLD AUTO: 5.3 %
NEUTROPHILS # BLD AUTO: 11.1 10E9/L (ref 1.6–8.3)
NEUTROPHILS # BLD AUTO: 14.7 10E9/L (ref 1.6–8.3)
NEUTROPHILS NFR BLD AUTO: 87.5 %
NEUTROPHILS NFR BLD AUTO: 91.6 %
NITRATE UR QL: NEGATIVE
NRBC # BLD AUTO: 0 10*3/UL
NRBC # BLD AUTO: 0 10*3/UL
NRBC BLD AUTO-RTO: 0 /100
NRBC BLD AUTO-RTO: 0 /100
NT-PROBNP SERPL-MCNC: 5284 PG/ML (ref 0–1800)
PH UR STRIP: 6 PH (ref 5–7)
PLATELET # BLD AUTO: 173 10E9/L (ref 150–450)
PLATELET # BLD AUTO: 174 10E9/L (ref 150–450)
PLATELET # BLD AUTO: 188 10E9/L (ref 150–450)
PLATELET # BLD AUTO: 201 10E9/L (ref 150–450)
PLATELET # BLD AUTO: 212 10E9/L (ref 150–450)
POTASSIUM SERPL-SCNC: 4 MMOL/L (ref 3.4–5.3)
POTASSIUM SERPL-SCNC: 4.2 MMOL/L (ref 3.4–5.3)
POTASSIUM SERPL-SCNC: 4.2 MMOL/L (ref 3.4–5.3)
POTASSIUM SERPL-SCNC: 4.4 MMOL/L (ref 3.4–5.3)
POTASSIUM SERPL-SCNC: 4.8 MMOL/L (ref 3.4–5.3)
PROCALCITONIN SERPL-MCNC: 12.35 NG/ML
PROT SERPL-MCNC: 5.5 G/DL (ref 6.8–8.8)
PROT SERPL-MCNC: 5.8 G/DL (ref 6.8–8.8)
RBC # BLD AUTO: 2.61 10E12/L (ref 3.8–5.2)
RBC # BLD AUTO: 2.69 10E12/L (ref 3.8–5.2)
RBC # BLD AUTO: 2.81 10E12/L (ref 3.8–5.2)
RBC # BLD AUTO: 2.82 10E12/L (ref 3.8–5.2)
RBC # BLD AUTO: 2.96 10E12/L (ref 3.8–5.2)
RBC #/AREA URNS AUTO: <1 /HPF (ref 0–2)
S PNEUM AG SPEC QL: NORMAL
SODIUM SERPL-SCNC: 139 MMOL/L (ref 133–144)
SODIUM SERPL-SCNC: 142 MMOL/L (ref 133–144)
SODIUM SERPL-SCNC: 145 MMOL/L (ref 133–144)
SODIUM SERPL-SCNC: 145 MMOL/L (ref 133–144)
SODIUM SERPL-SCNC: 146 MMOL/L (ref 133–144)
SOURCE: ABNORMAL
SP GR UR STRIP: 1.02 (ref 1–1.03)
SPECIMEN SOURCE: NORMAL
SQUAMOUS #/AREA URNS AUTO: <1 /HPF (ref 0–1)
UROBILINOGEN UR STRIP-MCNC: 0 MG/DL (ref 0–2)
VIT B12 SERPL-MCNC: 620 PG/ML (ref 193–986)
WBC # BLD AUTO: 12.7 10E9/L (ref 4–11)
WBC # BLD AUTO: 16.1 10E9/L (ref 4–11)
WBC # BLD AUTO: 4.1 10E9/L (ref 4–11)
WBC # BLD AUTO: 4.9 10E9/L (ref 4–11)
WBC # BLD AUTO: 5.3 10E9/L (ref 4–11)
WBC #/AREA URNS AUTO: <1 /HPF (ref 0–5)

## 2018-01-01 PROCEDURE — 70450 CT HEAD/BRAIN W/O DYE: CPT

## 2018-01-01 PROCEDURE — 99239 HOSP IP/OBS DSCHRG MGMT >30: CPT | Performed by: INTERNAL MEDICINE

## 2018-01-01 PROCEDURE — 85610 PROTHROMBIN TIME: CPT | Performed by: INTERNAL MEDICINE

## 2018-01-01 PROCEDURE — 40000895 ZZH STATISTIC SLP IP EVAL DEFER: Performed by: SPEECH-LANGUAGE PATHOLOGIST

## 2018-01-01 PROCEDURE — 25000128 H RX IP 250 OP 636: Performed by: INTERNAL MEDICINE

## 2018-01-01 PROCEDURE — 72125 CT NECK SPINE W/O DYE: CPT

## 2018-01-01 PROCEDURE — 99309 SBSQ NF CARE MODERATE MDM 30: CPT | Performed by: INTERNAL MEDICINE

## 2018-01-01 PROCEDURE — 25000132 ZZH RX MED GY IP 250 OP 250 PS 637: Performed by: INTERNAL MEDICINE

## 2018-01-01 PROCEDURE — 92610 EVALUATE SWALLOWING FUNCTION: CPT | Mod: GN | Performed by: SPEECH-LANGUAGE PATHOLOGIST

## 2018-01-01 PROCEDURE — 25000128 H RX IP 250 OP 636: Performed by: EMERGENCY MEDICINE

## 2018-01-01 PROCEDURE — 71046 X-RAY EXAM CHEST 2 VIEWS: CPT

## 2018-01-01 PROCEDURE — 87899 AGENT NOS ASSAY W/OPTIC: CPT | Performed by: INTERNAL MEDICINE

## 2018-01-01 PROCEDURE — 99223 1ST HOSP IP/OBS HIGH 75: CPT | Mod: AI | Performed by: INTERNAL MEDICINE

## 2018-01-01 PROCEDURE — 96367 TX/PROPH/DG ADDL SEQ IV INF: CPT

## 2018-01-01 PROCEDURE — 85025 COMPLETE CBC W/AUTO DIFF WBC: CPT | Performed by: INTERNAL MEDICINE

## 2018-01-01 PROCEDURE — 40000894 ZZH STATISTIC OT IP EVAL DEFER

## 2018-01-01 PROCEDURE — 99318 ZZC ANNUAL NURSING FAC ASSESSMNT, STABLE: CPT | Performed by: NURSE PRACTITIONER

## 2018-01-01 PROCEDURE — 85610 PROTHROMBIN TIME: CPT | Performed by: EMERGENCY MEDICINE

## 2018-01-01 PROCEDURE — 87040 BLOOD CULTURE FOR BACTERIA: CPT | Performed by: EMERGENCY MEDICINE

## 2018-01-01 PROCEDURE — 99308 SBSQ NF CARE LOW MDM 20: CPT | Performed by: NURSE PRACTITIONER

## 2018-01-01 PROCEDURE — 85025 COMPLETE CBC W/AUTO DIFF WBC: CPT | Performed by: EMERGENCY MEDICINE

## 2018-01-01 PROCEDURE — 80053 COMPREHEN METABOLIC PANEL: CPT | Performed by: EMERGENCY MEDICINE

## 2018-01-01 PROCEDURE — 36415 COLL VENOUS BLD VENIPUNCTURE: CPT | Performed by: INTERNAL MEDICINE

## 2018-01-01 PROCEDURE — 80053 COMPREHEN METABOLIC PANEL: CPT | Performed by: INTERNAL MEDICINE

## 2018-01-01 PROCEDURE — 81001 URINALYSIS AUTO W/SCOPE: CPT | Performed by: EMERGENCY MEDICINE

## 2018-01-01 PROCEDURE — 83605 ASSAY OF LACTIC ACID: CPT | Performed by: EMERGENCY MEDICINE

## 2018-01-01 PROCEDURE — 96365 THER/PROPH/DIAG IV INF INIT: CPT

## 2018-01-01 PROCEDURE — 87205 SMEAR GRAM STAIN: CPT | Performed by: INTERNAL MEDICINE

## 2018-01-01 PROCEDURE — 99309 SBSQ NF CARE MODERATE MDM 30: CPT | Performed by: NURSE PRACTITIONER

## 2018-01-01 PROCEDURE — 12000000 ZZH R&B MED SURG/OB

## 2018-01-01 PROCEDURE — 99285 EMERGENCY DEPT VISIT HI MDM: CPT | Mod: 25

## 2018-01-01 PROCEDURE — 40000225 ZZH STATISTIC SLP WARD VISIT: Performed by: SPEECH-LANGUAGE PATHOLOGIST

## 2018-01-01 PROCEDURE — 73030 X-RAY EXAM OF SHOULDER: CPT | Mod: 50

## 2018-01-01 PROCEDURE — 85027 COMPLETE CBC AUTOMATED: CPT | Performed by: INTERNAL MEDICINE

## 2018-01-01 PROCEDURE — 99310 SBSQ NF CARE HIGH MDM 45: CPT | Performed by: NURSE PRACTITIONER

## 2018-01-01 PROCEDURE — 87070 CULTURE OTHR SPECIMN AEROBIC: CPT | Performed by: INTERNAL MEDICINE

## 2018-01-01 PROCEDURE — 84145 PROCALCITONIN (PCT): CPT | Performed by: INTERNAL MEDICINE

## 2018-01-01 PROCEDURE — 36415 COLL VENOUS BLD VENIPUNCTURE: CPT | Performed by: EMERGENCY MEDICINE

## 2018-01-01 PROCEDURE — 83880 ASSAY OF NATRIURETIC PEPTIDE: CPT | Performed by: INTERNAL MEDICINE

## 2018-01-01 RX ORDER — ACETAMINOPHEN 500 MG
1000 TABLET ORAL 3 TIMES DAILY
Status: DISCONTINUED | OUTPATIENT
Start: 2018-01-01 | End: 2018-01-01 | Stop reason: HOSPADM

## 2018-01-01 RX ORDER — BISACODYL 10 MG
10 SUPPOSITORY, RECTAL RECTAL DAILY PRN
Status: DISCONTINUED | OUTPATIENT
Start: 2018-01-01 | End: 2018-01-01 | Stop reason: HOSPADM

## 2018-01-01 RX ORDER — GABAPENTIN 300 MG/1
300 CAPSULE ORAL 2 TIMES DAILY
Status: DISCONTINUED | OUTPATIENT
Start: 2018-01-01 | End: 2018-01-01 | Stop reason: HOSPADM

## 2018-01-01 RX ORDER — WARFARIN SODIUM 3 MG/1
3 TABLET ORAL
Status: COMPLETED | OUTPATIENT
Start: 2018-01-01 | End: 2018-01-01

## 2018-01-01 RX ORDER — CEFEPIME HYDROCHLORIDE 1 G/1
1 INJECTION, POWDER, FOR SOLUTION INTRAMUSCULAR; INTRAVENOUS ONCE
Status: COMPLETED | OUTPATIENT
Start: 2018-01-01 | End: 2018-01-01

## 2018-01-01 RX ORDER — ACETAMINOPHEN 325 MG/1
650 TABLET ORAL EVERY 4 HOURS PRN
Status: DISCONTINUED | OUTPATIENT
Start: 2018-01-01 | End: 2018-01-01 | Stop reason: HOSPADM

## 2018-01-01 RX ORDER — AZITHROMYCIN 250 MG/1
250 TABLET, FILM COATED ORAL DAILY
Qty: 3 TABLET | Refills: 0 | DISCHARGE
Start: 2018-01-01 | End: 2019-01-01

## 2018-01-01 RX ORDER — ONDANSETRON 2 MG/ML
4 INJECTION INTRAMUSCULAR; INTRAVENOUS EVERY 6 HOURS PRN
Status: DISCONTINUED | OUTPATIENT
Start: 2018-01-01 | End: 2018-01-01 | Stop reason: HOSPADM

## 2018-01-01 RX ORDER — BISACODYL 5 MG
10 TABLET, DELAYED RELEASE (ENTERIC COATED) ORAL DAILY PRN
Status: DISCONTINUED | OUTPATIENT
Start: 2018-01-01 | End: 2018-01-01 | Stop reason: HOSPADM

## 2018-01-01 RX ORDER — BUMETANIDE 0.5 MG/1
1 TABLET ORAL DAILY
Status: DISCONTINUED | OUTPATIENT
Start: 2018-01-01 | End: 2018-01-01 | Stop reason: HOSPADM

## 2018-01-01 RX ORDER — FERROUS SULFATE 325(65) MG
325 TABLET ORAL
Qty: 90 TABLET | Refills: 2 | DISCHARGE
Start: 2018-01-01 | End: 2019-01-01

## 2018-01-01 RX ORDER — WARFARIN SODIUM 3 MG/1
3 TABLET ORAL
Status: DISCONTINUED | OUTPATIENT
Start: 2018-01-01 | End: 2018-01-01 | Stop reason: HOSPADM

## 2018-01-01 RX ORDER — PANTOPRAZOLE SODIUM 40 MG/1
40 TABLET, DELAYED RELEASE ORAL DAILY
Qty: 30 TABLET | Refills: 1 | DISCHARGE
Start: 2018-01-01 | End: 2019-01-01

## 2018-01-01 RX ORDER — CEFAZOLIN SODIUM 1 G/50ML
1250 SOLUTION INTRAVENOUS ONCE
Status: COMPLETED | OUTPATIENT
Start: 2018-01-01 | End: 2018-01-01

## 2018-01-01 RX ORDER — CEFTRIAXONE 2 G/1
2 INJECTION, POWDER, FOR SOLUTION INTRAMUSCULAR; INTRAVENOUS EVERY 24 HOURS
Status: DISCONTINUED | OUTPATIENT
Start: 2018-01-01 | End: 2018-01-01 | Stop reason: HOSPADM

## 2018-01-01 RX ORDER — TROLAMINE SALICYLATE 10 G/100G
CREAM TOPICAL 3 TIMES DAILY
COMMUNITY

## 2018-01-01 RX ORDER — BISACODYL 5 MG
15 TABLET, DELAYED RELEASE (ENTERIC COATED) ORAL DAILY PRN
Status: DISCONTINUED | OUTPATIENT
Start: 2018-01-01 | End: 2018-01-01 | Stop reason: HOSPADM

## 2018-01-01 RX ORDER — OXYCODONE HYDROCHLORIDE 5 MG/1
2.5 TABLET ORAL 4 TIMES DAILY
Qty: 3 TABLET | Refills: 0 | Status: SHIPPED | DISCHARGE
Start: 2018-01-01

## 2018-01-01 RX ORDER — LIDOCAINE 40 MG/G
2.5 CREAM TOPICAL
Status: DISCONTINUED | OUTPATIENT
Start: 2018-01-01 | End: 2018-01-01 | Stop reason: HOSPADM

## 2018-01-01 RX ORDER — CARBOXYMETHYLCELLULOSE SODIUM 5 MG/ML
2 SOLUTION/ DROPS OPHTHALMIC 4 TIMES DAILY
COMMUNITY

## 2018-01-01 RX ORDER — CEFUROXIME AXETIL 500 MG/1
500 TABLET ORAL 2 TIMES DAILY
Qty: 10 TABLET | Refills: 0 | DISCHARGE
Start: 2018-01-01 | End: 2019-01-01

## 2018-01-01 RX ORDER — ATENOLOL 25 MG/1
25 TABLET ORAL DAILY
Status: DISCONTINUED | OUTPATIENT
Start: 2018-01-01 | End: 2018-01-01 | Stop reason: HOSPADM

## 2018-01-01 RX ORDER — SODIUM CHLORIDE 9 MG/ML
1000 INJECTION, SOLUTION INTRAVENOUS CONTINUOUS
Status: DISCONTINUED | OUTPATIENT
Start: 2018-01-01 | End: 2018-01-01

## 2018-01-01 RX ORDER — POLYETHYLENE GLYCOL 3350 17 G/17G
17 POWDER, FOR SOLUTION ORAL DAILY
Status: DISCONTINUED | OUTPATIENT
Start: 2018-01-01 | End: 2018-01-01 | Stop reason: HOSPADM

## 2018-01-01 RX ORDER — ONDANSETRON 4 MG/1
4 TABLET, ORALLY DISINTEGRATING ORAL EVERY 6 HOURS PRN
Status: DISCONTINUED | OUTPATIENT
Start: 2018-01-01 | End: 2018-01-01 | Stop reason: HOSPADM

## 2018-01-01 RX ORDER — NALOXONE HYDROCHLORIDE 0.4 MG/ML
.1-.4 INJECTION, SOLUTION INTRAMUSCULAR; INTRAVENOUS; SUBCUTANEOUS
Status: DISCONTINUED | OUTPATIENT
Start: 2018-01-01 | End: 2018-01-01 | Stop reason: HOSPADM

## 2018-01-01 RX ORDER — BISACODYL 5 MG
5 TABLET, DELAYED RELEASE (ENTERIC COATED) ORAL DAILY PRN
Status: DISCONTINUED | OUTPATIENT
Start: 2018-01-01 | End: 2018-01-01 | Stop reason: HOSPADM

## 2018-01-01 RX ORDER — LIDOCAINE 40 MG/G
CREAM TOPICAL
Status: DISCONTINUED | OUTPATIENT
Start: 2018-01-01 | End: 2018-01-01

## 2018-01-01 RX ORDER — AMMONIUM LACTATE 12 G/100G
LOTION TOPICAL 2 TIMES DAILY
COMMUNITY
End: 2018-01-01

## 2018-01-01 RX ORDER — HYDRALAZINE HYDROCHLORIDE 10 MG/1
10 TABLET, FILM COATED ORAL 3 TIMES DAILY
Status: DISCONTINUED | OUTPATIENT
Start: 2018-01-01 | End: 2018-01-01 | Stop reason: HOSPADM

## 2018-01-01 RX ADMIN — ACETAMINOPHEN 1000 MG: 500 TABLET, FILM COATED ORAL at 20:18

## 2018-01-01 RX ADMIN — POLYETHYLENE GLYCOL 3350 17 G: 17 POWDER, FOR SOLUTION ORAL at 10:02

## 2018-01-01 RX ADMIN — OXYCODONE HYDROCHLORIDE 2.5 MG: 5 TABLET ORAL at 10:01

## 2018-01-01 RX ADMIN — BUMETANIDE 1 MG: 0.5 TABLET ORAL at 10:07

## 2018-01-01 RX ADMIN — AZITHROMYCIN MONOHYDRATE 250 MG: 500 INJECTION, POWDER, LYOPHILIZED, FOR SOLUTION INTRAVENOUS at 03:06

## 2018-01-01 RX ADMIN — CEFTRIAXONE 2 G: 2 INJECTION, POWDER, FOR SOLUTION INTRAMUSCULAR; INTRAVENOUS at 09:04

## 2018-01-01 RX ADMIN — OXYCODONE HYDROCHLORIDE 2.5 MG: 5 TABLET ORAL at 10:07

## 2018-01-01 RX ADMIN — SODIUM CHLORIDE 1000 ML: 9 INJECTION, SOLUTION INTRAVENOUS at 23:30

## 2018-01-01 RX ADMIN — ACETAMINOPHEN 1000 MG: 500 TABLET, FILM COATED ORAL at 14:13

## 2018-01-01 RX ADMIN — HYDRALAZINE HYDROCHLORIDE 10 MG: 10 TABLET, FILM COATED ORAL at 21:13

## 2018-01-01 RX ADMIN — BUMETANIDE 1 MG: 0.5 TABLET ORAL at 10:01

## 2018-01-01 RX ADMIN — ACETAMINOPHEN 1000 MG: 500 TABLET, FILM COATED ORAL at 10:07

## 2018-01-01 RX ADMIN — CEFTRIAXONE 2 G: 2 INJECTION, POWDER, FOR SOLUTION INTRAMUSCULAR; INTRAVENOUS at 10:02

## 2018-01-01 RX ADMIN — GABAPENTIN 300 MG: 300 CAPSULE ORAL at 20:19

## 2018-01-01 RX ADMIN — WARFARIN SODIUM 3 MG: 3 TABLET ORAL at 19:00

## 2018-01-01 RX ADMIN — ATENOLOL 25 MG: 25 TABLET ORAL at 10:07

## 2018-01-01 RX ADMIN — VANCOMYCIN HYDROCHLORIDE 1250 MG: 5 INJECTION, POWDER, LYOPHILIZED, FOR SOLUTION INTRAVENOUS at 01:39

## 2018-01-01 RX ADMIN — HYDRALAZINE HYDROCHLORIDE 10 MG: 10 TABLET, FILM COATED ORAL at 10:07

## 2018-01-01 RX ADMIN — HYDRALAZINE HYDROCHLORIDE 10 MG: 10 TABLET, FILM COATED ORAL at 10:02

## 2018-01-01 RX ADMIN — OXYCODONE HYDROCHLORIDE 2.5 MG: 5 TABLET ORAL at 16:57

## 2018-01-01 RX ADMIN — GABAPENTIN 300 MG: 300 CAPSULE ORAL at 10:02

## 2018-01-01 RX ADMIN — CEFEPIME HYDROCHLORIDE 1 G: 1 INJECTION, POWDER, FOR SOLUTION INTRAMUSCULAR; INTRAVENOUS at 01:08

## 2018-01-01 RX ADMIN — ACETAMINOPHEN 1000 MG: 500 TABLET, FILM COATED ORAL at 10:00

## 2018-01-01 RX ADMIN — GABAPENTIN 300 MG: 300 CAPSULE ORAL at 10:07

## 2018-01-01 RX ADMIN — AZITHROMYCIN MONOHYDRATE 500 MG: 500 INJECTION, POWDER, LYOPHILIZED, FOR SOLUTION INTRAVENOUS at 03:18

## 2018-01-01 RX ADMIN — ATENOLOL 25 MG: 25 TABLET ORAL at 10:01

## 2018-01-01 ASSESSMENT — ACTIVITIES OF DAILY LIVING (ADL)
ADLS_ACUITY_SCORE: 27
ADLS_ACUITY_SCORE: 24
ADLS_ACUITY_SCORE: 29
ADLS_ACUITY_SCORE: 29
ADLS_ACUITY_SCORE: 28
ADLS_ACUITY_SCORE: 27
ADLS_ACUITY_SCORE: 28
ADLS_ACUITY_SCORE: 27
ADLS_ACUITY_SCORE: 27

## 2018-01-01 ASSESSMENT — ENCOUNTER SYMPTOMS
MYALGIAS: 1
ARTHRALGIAS: 1
WOUND: 1
HEADACHES: 1
NECK PAIN: 1

## 2018-01-01 ASSESSMENT — MIFFLIN-ST. JEOR: SCORE: 986.83

## 2018-01-03 ENCOUNTER — NURSING HOME VISIT (OUTPATIENT)
Dept: GERIATRICS | Facility: CLINIC | Age: 83
End: 2018-01-03
Payer: COMMERCIAL

## 2018-01-03 VITALS
BODY MASS INDEX: 23.6 KG/M2 | RESPIRATION RATE: 17 BRPM | HEIGHT: 64 IN | WEIGHT: 138.2 LBS | OXYGEN SATURATION: 95 % | SYSTOLIC BLOOD PRESSURE: 116 MMHG | HEART RATE: 84 BPM | TEMPERATURE: 97.8 F | DIASTOLIC BLOOD PRESSURE: 67 MMHG

## 2018-01-03 DIAGNOSIS — Z51.81 ENCOUNTER FOR MONITORING COUMADIN THERAPY: ICD-10-CM

## 2018-01-03 DIAGNOSIS — Z79.01 ENCOUNTER FOR MONITORING COUMADIN THERAPY: ICD-10-CM

## 2018-01-03 DIAGNOSIS — I82.409 RECURRENT DEEP VEIN THROMBOSIS (DVT) (H): Primary | ICD-10-CM

## 2018-01-03 DIAGNOSIS — Z86.718 PERSONAL HISTORY OF DVT (DEEP VEIN THROMBOSIS): ICD-10-CM

## 2018-01-03 PROCEDURE — 99308 SBSQ NF CARE LOW MDM 20: CPT | Performed by: NURSE PRACTITIONER

## 2018-01-03 NOTE — PROGRESS NOTES
[place] [facility]    HPI:    Celeste Hawthorne is a 89 year old  (7/30/1915), who is being seen today for an episodic care visit at [facility].   HPI information obtained from: facility chart records. Today's concern is INR/Coumadin management for DVT    Bleeding Signs/Symptoms:  None  Thromboembolic Signs/Symptoms:  None    Medication Changes:  No  Dietary Changes:  No  Activity Changes: No  Bacterial/Viral Infection:  No    Missed Coumadin Doses:  None    On ASA: No    Other Concerns:  No    OBJECTIVE:    INR Today:  1.3  Current Dose:  3 mg po daily since 12-    ASSESSMENT:  Encounter for monitoring coumadin therapy  INR according to current and past results    Personal history of DVT (deep vein thrombosis)  Had DVT in past    Recurrent deep vein thrombosis (DVT) (H)  New dx of DVT with tx started end of December 2017    Subtherapeutic INR for goal of 1.8-2.5    PLAN:    New Dose: 5 mg po today and tomorrow      Next INR: 1-5-2018      ANN MARIE Prasad CNP

## 2018-01-05 ENCOUNTER — NURSING HOME VISIT (OUTPATIENT)
Dept: GERIATRICS | Facility: CLINIC | Age: 83
End: 2018-01-05
Payer: COMMERCIAL

## 2018-01-05 VITALS
RESPIRATION RATE: 17 BRPM | TEMPERATURE: 97.8 F | DIASTOLIC BLOOD PRESSURE: 74 MMHG | WEIGHT: 138.2 LBS | SYSTOLIC BLOOD PRESSURE: 142 MMHG | BODY MASS INDEX: 23.72 KG/M2 | HEART RATE: 86 BPM | OXYGEN SATURATION: 95 %

## 2018-01-05 DIAGNOSIS — I82.4Y1 DVT, LOWER EXTREMITY, PROXIMAL, ACUTE, RIGHT (H): Primary | ICD-10-CM

## 2018-01-05 DIAGNOSIS — M79.604 PAIN OF RIGHT LOWER EXTREMITY: ICD-10-CM

## 2018-01-05 DIAGNOSIS — R60.0 BILATERAL LEG EDEMA: ICD-10-CM

## 2018-01-05 PROCEDURE — 99309 SBSQ NF CARE MODERATE MDM 30: CPT | Performed by: NURSE PRACTITIONER

## 2018-01-05 NOTE — PROGRESS NOTES
"[place] [facility]    Chief Complaint   Patient presents with     Nursing Home Acute     Pain     INR RESULTS       HPI:    Celeste Hawthorne is a 89 year old  (7/30/1915), who is being seen today for an episodic care visit at [facility].  HPI information obtained from: facility chart records.Today's concern is:     DVT, lower extremity, proximal, acute, right (H)  Pain of right lower extremity (chronic)  - patient reports pain to RLE with movement/transfers. States otherwise no pain and is comfortable. Reports this has been going on for some time. States cannot tell if pain meds effective as only painful when transfers and with activity.       Bilateral leg edema - > right, chronic and stable. Patient wears own \"compression socks\" intermittently.        ALLERGIES: Amoxicillin  Past Medical, Surgical, Family and Social History reviewed and updated in [facility].    Current Outpatient Prescriptions   Medication Sig Dispense Refill     warfarin (COUMADIN) 2 MG tablet Take 3 mg by mouth daily until 01/08; check INR 01/09       HydrALAZINE HCl (APRESOLINE PO) Take 15 mg by mouth 3 times daily        ACETAMINOPHEN PO Take 650 mg by mouth 3 times daily       ARTIFICIAL TEAR OP Place 1 drop into both eyes 4 times daily ; and instill 1 drop in both eyes PRN       Torsemide (DEMADEX PO) Take 2.5 mg by mouth daily       potassium chloride (KLOR-CON) 20 MEQ Packet Take 20 mEq by mouth daily       ammonium lactate (LAC-HYDRIN) 12 % lotion Apply topically 2 times daily Apply to All extremities topically every day and evening shift for Dry Skin       OXYCODONE HCL PO Take 2.5 mg by mouth 3 times daily 6-12-6       polyethylene glycol (MIRALAX/GLYCOLAX) packet Take 1 packet by mouth daily       gabapentin (NEURONTIN) 100 MG capsule Take 300 mg by mouth 2 times daily        VITAMIN D, CHOLECALCIFEROL, PO Take 1,000 Units by mouth daily       multivitamin (OCUVITE) TABS Take 1 tablet by mouth daily       " atenolol (TENORMIN) 25 MG tablet Take 25 mg by mouth daily.       Medications reviewed:  Medications reconciled to facility chart and changes were made to reflect current medications as identified as above med list. Below are the changes that were made:   Medications stopped since last EPIC medication reconciliation:   There are no discontinued medications.    Medications started since last [facility] medication reconciliation:  No orders of the defined types were placed in this encounter.        REVIEW OF SYSTEMS:  4 point ROS including Respiratory, CV, GI and , other than that noted in the HPI,  is negative    Physical Exam:  /74  Pulse 86  Temp 97.8  F (36.6  C)  Resp 17  Wt 138 lb 3.2 oz (62.7 kg)  SpO2 95%  BMI 23.72 kg/m2    Resp: Effort WNL, LS decreased bilateral bases  CV: S1-2, no S3-4, no murmurs noted- - edema bilateral LE, slight > right, + pedal edema  Abd- soft, nontender, BS +  Musc- DAVE- w/c   Psych- alert, calm, pleasant       BP 12/29-01/05: 106-190/65-98 mmHg    Recent Labs:    CBC RESULTS:   Recent Labs   Lab Test  12/21/17   0620  11/27/17   0900   12/05/16   0955   WBC  5.0   --    --   12.9*   RBC  3.71*   --    --   3.26*   HGB  11.8  13.3   < >  10.8*   HCT  36.5   --    --   33.2*   MCV  98   --    --   102*   MCH  31.8   --    --   33.1*   MCHC  32.3   --    --   32.5   RDW  14.0   --    --   13.1   PLT  208   --    --   147*    < > = values in this interval not displayed.       Last Basic Metabolic Panel:  Recent Labs   Lab Test  11/27/17   0900  07/24/17   0636   NA  140  143   POTASSIUM  4.5  4.2   CHLORIDE  103  105   HEIDY  9.3  9.3   CO2  33*  32   BUN  23  20   CR  1.26*  1.13*   GLC  88  66*       Assessment/Plan:  DVT, lower extremity, proximal, acute, right (H)  Pain of right lower extremity  - INR 1.8- continue on warfarin with goal 1.8 - 2.5  - likely pain at least partically 2/2 DVT   - continue to tx DVT  - continue on scheduled oxycodone, Gabapentin and acetaminophen  but increase to 1000 TID  - consider addition of low dose prn oxycodone    Bilateral leg edema  - chronic, weight stabl  - continue current orders for compression, elevation and cont on Torsemide  - follow clincially              Electronically signed by  ANN MARIE Blunt CNP

## 2018-01-07 PROBLEM — M79.604 PAIN OF RIGHT LOWER EXTREMITY: Status: ACTIVE | Noted: 2018-01-07

## 2018-01-07 PROBLEM — I82.4Y1 DVT, LOWER EXTREMITY, PROXIMAL, ACUTE, RIGHT (H): Status: ACTIVE | Noted: 2018-01-07

## 2018-01-08 ENCOUNTER — HOSPITAL LABORATORY (OUTPATIENT)
Dept: OTHER | Facility: CLINIC | Age: 83
End: 2018-01-08

## 2018-01-09 LAB
ANION GAP SERPL CALCULATED.3IONS-SCNC: NORMAL MMOL/L (ref 6–17)
BUN SERPL-MCNC: NORMAL MG/DL (ref 7–30)
CALCIUM SERPL-MCNC: NORMAL MG/DL (ref 8.5–10.1)
CHLORIDE SERPL-SCNC: NORMAL MMOL/L (ref 94–109)
CO2 SERPL-SCNC: NORMAL MMOL/L (ref 20–32)
CREAT SERPL-MCNC: NORMAL MG/DL (ref 0.52–1.04)
GFR SERPL CREATININE-BSD FRML MDRD: NORMAL ML/MIN/1.7M2
GLUCOSE SERPL-MCNC: NORMAL MG/DL (ref 70–99)
POTASSIUM SERPL-SCNC: NORMAL MMOL/L (ref 3.4–5.3)
SODIUM SERPL-SCNC: NORMAL MMOL/L (ref 133–144)

## 2018-01-10 ENCOUNTER — HOSPITAL LABORATORY (OUTPATIENT)
Dept: OTHER | Facility: CLINIC | Age: 83
End: 2018-01-10

## 2018-01-10 LAB
ANION GAP SERPL CALCULATED.3IONS-SCNC: 2 MMOL/L (ref 3–14)
BUN SERPL-MCNC: 30 MG/DL (ref 7–30)
CALCIUM SERPL-MCNC: 8.7 MG/DL (ref 8.5–10.1)
CHLORIDE SERPL-SCNC: 107 MMOL/L (ref 94–109)
CO2 SERPL-SCNC: 32 MMOL/L (ref 20–32)
CREAT SERPL-MCNC: 1.41 MG/DL (ref 0.52–1.04)
GFR SERPL CREATININE-BSD FRML MDRD: 34 ML/MIN/1.7M2
GLUCOSE SERPL-MCNC: 75 MG/DL (ref 70–99)
POTASSIUM SERPL-SCNC: 5 MMOL/L (ref 3.4–5.3)
SODIUM SERPL-SCNC: 141 MMOL/L (ref 133–144)

## 2018-01-29 ENCOUNTER — NURSING HOME VISIT (OUTPATIENT)
Dept: GERIATRICS | Facility: CLINIC | Age: 83
End: 2018-01-29
Payer: COMMERCIAL

## 2018-01-29 VITALS
WEIGHT: 140.2 LBS | TEMPERATURE: 98.1 F | HEIGHT: 64 IN | BODY MASS INDEX: 23.93 KG/M2 | OXYGEN SATURATION: 92 % | RESPIRATION RATE: 16 BRPM | SYSTOLIC BLOOD PRESSURE: 146 MMHG | DIASTOLIC BLOOD PRESSURE: 84 MMHG | HEART RATE: 81 BPM

## 2018-01-29 DIAGNOSIS — R60.0 BILATERAL LEG EDEMA: Primary | ICD-10-CM

## 2018-01-29 DIAGNOSIS — Z86.718 PERSONAL HISTORY OF DVT (DEEP VEIN THROMBOSIS): ICD-10-CM

## 2018-01-29 PROCEDURE — 99308 SBSQ NF CARE LOW MDM 20: CPT | Performed by: NURSE PRACTITIONER

## 2018-01-29 NOTE — PROGRESS NOTES
[place] [facility]    Chief Complaint   Patient presents with     Nursing Home Acute     Edema       HPI:    Celeste Hawthorne is a 89 year old  (7/30/1915), who is being seen today for an episodic care visit at [facility].  HPI information obtained from: facility chart records.Today's concern is:     Bilateral leg edema  - this is chronic, but nsg notes some weeping to RLE over w/e. Weights and VSS and is on diuretic    Personal history of DVT (deep vein thrombosis)  - RLE, is on warfarin.     ALLERGIES: Amoxicillin  Past Medical, Surgical, Family and Social History reviewed and updated in [facility].    Current Outpatient Prescriptions   Medication Sig Dispense Refill     warfarin (COUMADIN) 2 MG tablet Take 5 mg by mouth daily on Sun, Tues, Thurs, Sat AND take 2.5mg on Mon, Wed, Fri until 01/29; check INR 01/30       HydrALAZINE HCl (APRESOLINE PO) Take 15 mg by mouth 3 times daily        ACETAMINOPHEN PO Take 1,000 mg by mouth 3 times daily        ARTIFICIAL TEAR OP Place 1 drop into both eyes 4 times daily ; and instill 1 drop in both eyes PRN       Torsemide (DEMADEX PO) Take 2.5 mg by mouth daily       potassium chloride (KLOR-CON) 20 MEQ Packet Take 20 mEq by mouth daily       ammonium lactate (LAC-HYDRIN) 12 % lotion Apply topically 2 times daily Apply to All extremities topically every day and evening shift for Dry Skin       OXYCODONE HCL PO Take 2.5 mg by mouth 3 times daily 6-12-6       polyethylene glycol (MIRALAX/GLYCOLAX) packet Take 1 packet by mouth daily       gabapentin (NEURONTIN) 100 MG capsule Take 300 mg by mouth 2 times daily        VITAMIN D, CHOLECALCIFEROL, PO Take 1,000 Units by mouth daily       multivitamin (OCUVITE) TABS Take 1 tablet by mouth daily       atenolol (TENORMIN) 25 MG tablet Take 25 mg by mouth daily.       Medications reviewed:  Medications reconciled to facility chart and changes were made to reflect current medications as identified as  "above med list. Below are the changes that were made:   Medications stopped since last EPIC medication reconciliation:   There are no discontinued medications.    Medications started since last [facility] medication reconciliation:  No orders of the defined types were placed in this encounter.        REVIEW OF SYSTEMS:  4 point ROS including Respiratory, CV, GI and , other than that noted in the HPI,  is negative    Physical Exam:  /84  Pulse 81  Temp 98.1  F (36.7  C)  Resp 16  Ht 5' 4\" (1.626 m)  Wt 140 lb 3.2 oz (63.6 kg)  SpO2 92%  BMI 24.07 kg/m2    Resp: Effort WNL, LSCTA - slight decreased in bases  CV: VS as above, edema as above  Abd- soft, nontender, BS +  Musc- DAVE- w/c  Skin- moderate weeping from pinpoint opening RLE  Psych- alert, calm, pleasant      BP 01/22-01/29: 117-172/70-93 mmHg    Recent Labs:     CBC RESULTS:   Recent Labs   Lab Test  12/21/17   0620  11/27/17   0900   12/05/16   0955   WBC  5.0   --    --   12.9*   RBC  3.71*   --    --   3.26*   HGB  11.8  13.3   < >  10.8*   HCT  36.5   --    --   33.2*   MCV  98   --    --   102*   MCH  31.8   --    --   33.1*   MCHC  32.3   --    --   32.5   RDW  14.0   --    --   13.1   PLT  208   --    --   147*    < > = values in this interval not displayed.       Last Basic Metabolic Panel:  Recent Labs   Lab Test  01/10/18   0700  01/08/18   0700   NA  141  Specimen not received   POTASSIUM  5.0  Specimen not received   CHLORIDE  107  Specimen not received   HEIDY  8.7  Specimen not received   CO2  32  Specimen not received   BUN  30  Specimen not received   CR  1.41*  Specimen not received   GLC  75  Specimen not received     Assessment/Plan:    Bilateral leg edema  - dependent in nature- exac by DVT. Weights stable and is on low dose Torsemide  - patient has TEDS but rarely wears them  - add cleansing and pat dry, apply adaptic to any noted open area, cover with kerlix wrap and ABDs on during day off at night. Elevate LE TID  - BMP tomorrow- " ? If slight higher dose of diuretic would be of any benefit.    Personal history of DVT (deep vein thrombosis)  - RLE  - continue on warfarin with InR goal 2-3      Electronically signed by  ANN MARIE Blunt CNP

## 2018-01-30 ENCOUNTER — HOSPITAL LABORATORY (OUTPATIENT)
Dept: OTHER | Facility: CLINIC | Age: 83
End: 2018-01-30

## 2018-01-30 LAB
ANION GAP SERPL CALCULATED.3IONS-SCNC: 2 MMOL/L (ref 3–14)
BUN SERPL-MCNC: 22 MG/DL (ref 7–30)
CALCIUM SERPL-MCNC: 9.3 MG/DL (ref 8.5–10.1)
CHLORIDE SERPL-SCNC: 107 MMOL/L (ref 94–109)
CO2 SERPL-SCNC: 34 MMOL/L (ref 20–32)
CREAT SERPL-MCNC: 1.27 MG/DL (ref 0.52–1.04)
GFR SERPL CREATININE-BSD FRML MDRD: 39 ML/MIN/1.7M2
GLUCOSE SERPL-MCNC: 76 MG/DL (ref 70–99)
POTASSIUM SERPL-SCNC: 5.2 MMOL/L (ref 3.4–5.3)
SODIUM SERPL-SCNC: 143 MMOL/L (ref 133–144)

## 2018-02-13 ENCOUNTER — HOSPITAL LABORATORY (OUTPATIENT)
Dept: OTHER | Facility: CLINIC | Age: 83
End: 2018-02-13

## 2018-02-13 LAB
ANION GAP SERPL CALCULATED.3IONS-SCNC: 3 MMOL/L (ref 3–14)
BUN SERPL-MCNC: 28 MG/DL (ref 7–30)
CALCIUM SERPL-MCNC: 9 MG/DL (ref 8.5–10.1)
CHLORIDE SERPL-SCNC: 105 MMOL/L (ref 94–109)
CO2 SERPL-SCNC: 34 MMOL/L (ref 20–32)
CREAT SERPL-MCNC: 1.35 MG/DL (ref 0.52–1.04)
GFR SERPL CREATININE-BSD FRML MDRD: 36 ML/MIN/1.7M2
GLUCOSE SERPL-MCNC: 72 MG/DL (ref 70–99)
POTASSIUM SERPL-SCNC: 4.8 MMOL/L (ref 3.4–5.3)
SODIUM SERPL-SCNC: 142 MMOL/L (ref 133–144)

## 2018-02-23 ENCOUNTER — CLINICAL UPDATE (OUTPATIENT)
Dept: PHARMACY | Facility: CLINIC | Age: 83
End: 2018-02-23

## 2018-02-23 NOTE — PROGRESS NOTES
This patient's medication list and chart were reviewed as part of the service provided by [facility].    Assessment/Recommendations:  1. (Pain):  Appears Gabapentin was initiated for hip pain in past.  Pt's est CrCl ([place]-Gault) = 18ml/min.  Recommended Gabapentin dose for CrCl >15-29ml/min is 200-700mg once daily.  Please consider reduction in current Gabapentin dose from 300mg bid to 300mg once daily.  Reduced frequency in dosing recommended to avoid accumulation of med and increased risk of side effects such as peripheral edema (pt with history of), as well as confusion, increased delirium risk, dizziness, falls, etc.  If necessary, could try topical agent such as lidocaine cream to hip.  2. (Supplements):  Most recent K level = 4.8.  Consider reduction in KCl dose to 10meq once daily and follow-up K level.  If level remains >4.0 and pt remains on same diuretic dose, may be that KCl supplementation is not required.      Lori Ridley, Pharm.D.,[facility]  Board Certified Geriatric Pharmacist  Medication Therapy Management Pharmacist  [phone]

## 2018-02-27 ENCOUNTER — NURSING HOME VISIT (OUTPATIENT)
Dept: GERIATRICS | Facility: CLINIC | Age: 83
End: 2018-02-27
Payer: COMMERCIAL

## 2018-02-27 VITALS
HEIGHT: 64 IN | TEMPERATURE: 97.3 F | DIASTOLIC BLOOD PRESSURE: 76 MMHG | WEIGHT: 151 LBS | OXYGEN SATURATION: 92 % | SYSTOLIC BLOOD PRESSURE: 140 MMHG | HEART RATE: 88 BPM | BODY MASS INDEX: 25.78 KG/M2 | RESPIRATION RATE: 16 BRPM

## 2018-02-27 DIAGNOSIS — F03.90 DEMENTIA WITHOUT BEHAVIORAL DISTURBANCE, UNSPECIFIED DEMENTIA TYPE: ICD-10-CM

## 2018-02-27 DIAGNOSIS — I12.9 BENIGN HYPERTENSION WITH CKD (CHRONIC KIDNEY DISEASE) STAGE III (H): ICD-10-CM

## 2018-02-27 DIAGNOSIS — N18.30 BENIGN HYPERTENSION WITH CKD (CHRONIC KIDNEY DISEASE) STAGE III (H): ICD-10-CM

## 2018-02-27 DIAGNOSIS — R60.0 BILATERAL LEG EDEMA: Primary | ICD-10-CM

## 2018-02-27 DIAGNOSIS — Z86.718 PERSONAL HISTORY OF DVT (DEEP VEIN THROMBOSIS): ICD-10-CM

## 2018-02-27 PROCEDURE — 99309 SBSQ NF CARE MODERATE MDM 30: CPT | Performed by: NURSE PRACTITIONER

## 2018-02-27 NOTE — PROGRESS NOTES
"  [place] [facility]    Chief Complaint   Patient presents with     group home Regulatory       HPI:    Celeste Hawthorne is a 89 year old  (7/30/1915), who is being seen today for a federally mandated E/M visit at [facility].  HPI information obtained from: facility chart records. Today's concerns are:  Bilateral leg edema  - 3++ pitting to bilateral LE today. No ace bandages applied last 48 hours and patient usually up in w/c with legs down. Reports mild orthopnea and this has been present last several months. Relieved with HOB up at night. Denies SOB, chest pain today. Does state legs \"more swollen\" and \"hurt\". Weights are up per review in [facility]. Is on low dose Demadex    Benign hypertension with CKD (chronic kidney disease) stage III  - BPs controlled, Cr baseline    Dementia without behavioral disturbance, unspecified dementia type  - BIMS 9/15- Jan 2018  - in LTC with supportive cares and tx.     Personal history of DVT (deep vein thrombosis)  - RLE, is on warfarin- no evidence of bleeding      ALLERGIES: Amoxicillin  PAST MEDICAL HISTORY:  has a past medical history of Anemia; Anemia due to blood loss, acute (5/14/2012); Breast cancer (H); Constipation; History of blood transfusion; Hypertension; Left shoulder pain; Macular degeneration; and Malignant neoplasm (H) (1981).  PAST SURGICAL HISTORY:  has a past surgical history that includes Esophagoscopy (5/11/2012); Breast surgery (1981); Open reduction internal fixation femur distal (5/8/2012); Eye surgery; hemorrhoidectomy; Repair esophageal stricture; and Reverse arthroplasty shoulder (Left, 8/19/2014).  FAMILY HISTORY: family history includes Other Cancer in her son.  SOCIAL HISTORY:  reports that she has never smoked. She has never used smokeless tobacco. She reports that she drinks alcohol. She reports that she does not use illicit drugs.    MEDICATIONS:  Current Outpatient Prescriptions   Medication Sig Dispense Refill "     warfarin (COUMADIN) 2 MG tablet Take 5 mg by mouth daily on Tues, Thurs, Sat AND take 2.5mg on Sun, Mon, Wed, Fri until 03/05; check INR 03/06       HydrALAZINE HCl (APRESOLINE PO) Take 15 mg by mouth 3 times daily        ACETAMINOPHEN PO Take 1,000 mg by mouth 3 times daily        ARTIFICIAL TEAR OP Place 1 drop into both eyes 4 times daily ; and instill 1 drop in both eyes PRN       Torsemide (DEMADEX PO) Take 2.5 mg by mouth daily       ammonium lactate (LAC-HYDRIN) 12 % lotion Apply topically 2 times daily Apply to All extremities topically every day and evening shift for Dry Skin       OXYCODONE HCL PO Take 2.5 mg by mouth 3 times daily 6-12-6       polyethylene glycol (MIRALAX/GLYCOLAX) packet Take 1 packet by mouth daily       gabapentin (NEURONTIN) 100 MG capsule Take 300 mg by mouth 2 times daily        VITAMIN D, CHOLECALCIFEROL, PO Take 1,000 Units by mouth daily       multivitamin (OCUVITE) TABS Take 1 tablet by mouth daily       atenolol (TENORMIN) 25 MG tablet Take 25 mg by mouth daily.       Medications reviewed:  Medications reconciled to facility chart and changes were made to reflect current medications as identified as above med list. Below are the changes that were made:   Medications stopped since last EPIC medication reconciliation:   Medications Discontinued During This Encounter   Medication Reason     potassium chloride (KLOR-CON) 20 MEQ Packet Discontinued by another Health Care Provider       Medications started since last [facility] medication reconciliation:  No orders of the defined types were placed in this encounter.      Case Management:  I have reviewed the care plan and MDS and do agree with the plan. Patient's desire to return to the community is present, but is not able due to care needs .  Information reviewed:  Medications, vital signs, orders, and nursing notes.    ROS:  Limited secondary to cognitive impairment but today pt reports as aove    Exam:  Vitals: /76  Pulse 88  " Temp 97.3  F (36.3  C)  Resp 16  Ht 5' 4\" (1.626 m)  Wt 151 lb (68.5 kg)  SpO2 92%  BMI 25.92 kg/m2  BMI= Body mass index is 25.92 kg/(m^2).    Resp: Effort WNL, LSCTA   CV: VS as above, RRR- Edema as above  Abd- soft, nontender, BS +  Musc- - DAVE, w/c dependent  Psych- alert, calm, pleasant      BP 02/20-02/27: 118-192/65-95 mmHg  Weights:  02/26: 151.0lbs  02/19: 148.4lbs   02/05: 143.8lbs  01/29: 144.2lbs    Lab/Diagnostic data:     CBC RESULTS:   Recent Labs   Lab Test  12/21/17   0620  11/27/17   0900   12/05/16   0955   WBC  5.0   --    --   12.9*   RBC  3.71*   --    --   3.26*   HGB  11.8  13.3   < >  10.8*   HCT  36.5   --    --   33.2*   MCV  98   --    --   102*   MCH  31.8   --    --   33.1*   MCHC  32.3   --    --   32.5   RDW  14.0   --    --   13.1   PLT  208   --    --   147*    < > = values in this interval not displayed.       Last Basic Metabolic Panel:  Recent Labs   Lab Test  02/13/18   0555  01/30/18   0800   NA  142  143   POTASSIUM  4.8  5.2   CHLORIDE  105  107   HEIDY  9.0  9.3   CO2  34*  34*   BUN  28  22   CR  1.35*  1.27*   GLC  72  76       ASSESSMENT/PLAN  Bilateral leg edema  - clarify with nsg need to perform compression wraps daily and put on first thing in a.m.  As well nsg to assist patient to elevate bilateral LE TID  - Give addtl 2.5 mg Demadex now then d/c  - add Bumex 1 mg tomorrow then 0.5 mg daily  - BMP 3/2  - follow weights and edema closely    Benign hypertension with CKD (chronic kidney disease) stage III  - Baseline Cr 1.2- 1.3- currently stable  - add Bumex as above, but will check BMP 3/2    Dementia without behavioral disturbance, unspecified dementia type  - moderate impairment  - Progressive disease, continued global decline is anticipated.  - Continue supportive cares and treatments in LTC. Ongoing advanced care planning.      Personal history of DVT (deep vein thrombosis)  - continue on warfarin - gaol INR 2-3  - observe for s/s bleeding      Electronically " signed by  ANN MARIE Blunt CNP

## 2018-03-02 ENCOUNTER — DOCUMENTATION ONLY (OUTPATIENT)
Dept: OTHER | Facility: CLINIC | Age: 83
End: 2018-03-02

## 2018-03-02 ENCOUNTER — HOSPITAL LABORATORY (OUTPATIENT)
Dept: OTHER | Facility: CLINIC | Age: 83
End: 2018-03-02

## 2018-03-02 DIAGNOSIS — Z71.89 ACP (ADVANCE CARE PLANNING): Chronic | ICD-10-CM

## 2018-03-02 LAB
ANION GAP SERPL CALCULATED.3IONS-SCNC: 5 MMOL/L (ref 3–14)
BUN SERPL-MCNC: 24 MG/DL (ref 7–30)
CALCIUM SERPL-MCNC: 8.9 MG/DL (ref 8.5–10.1)
CHLORIDE SERPL-SCNC: 105 MMOL/L (ref 94–109)
CO2 SERPL-SCNC: 32 MMOL/L (ref 20–32)
CREAT SERPL-MCNC: 1.23 MG/DL (ref 0.52–1.04)
GFR SERPL CREATININE-BSD FRML MDRD: 40 ML/MIN/1.7M2
GLUCOSE SERPL-MCNC: 83 MG/DL (ref 70–99)
POTASSIUM SERPL-SCNC: 4.2 MMOL/L (ref 3.4–5.3)
SODIUM SERPL-SCNC: 142 MMOL/L (ref 133–144)

## 2018-03-05 ENCOUNTER — NURSING HOME VISIT (OUTPATIENT)
Dept: GERIATRICS | Facility: CLINIC | Age: 83
End: 2018-03-05
Payer: COMMERCIAL

## 2018-03-05 VITALS
TEMPERATURE: 97.6 F | DIASTOLIC BLOOD PRESSURE: 84 MMHG | OXYGEN SATURATION: 95 % | RESPIRATION RATE: 18 BRPM | SYSTOLIC BLOOD PRESSURE: 159 MMHG | WEIGHT: 147 LBS | BODY MASS INDEX: 25.23 KG/M2 | HEART RATE: 88 BPM

## 2018-03-05 DIAGNOSIS — F03.90 DEMENTIA WITHOUT BEHAVIORAL DISTURBANCE, UNSPECIFIED DEMENTIA TYPE: ICD-10-CM

## 2018-03-05 DIAGNOSIS — N18.30 BENIGN HYPERTENSION WITH CKD (CHRONIC KIDNEY DISEASE) STAGE III (H): ICD-10-CM

## 2018-03-05 DIAGNOSIS — Z86.718 PERSONAL HISTORY OF DVT (DEEP VEIN THROMBOSIS): ICD-10-CM

## 2018-03-05 DIAGNOSIS — R60.0 BILATERAL LEG EDEMA: Primary | ICD-10-CM

## 2018-03-05 DIAGNOSIS — I12.9 BENIGN HYPERTENSION WITH CKD (CHRONIC KIDNEY DISEASE) STAGE III (H): ICD-10-CM

## 2018-03-05 PROCEDURE — 99309 SBSQ NF CARE MODERATE MDM 30: CPT | Performed by: NURSE PRACTITIONER

## 2018-03-05 NOTE — PROGRESS NOTES
"[facility]    Chief Complaint   Patient presents with     Edema       HPI:    Celeste Hawthorne is a 89 year old  (7/30/1915), who is being seen today for an episodic care visit at [facility].  HPI information obtained from: facility chart records.Today's concern is:     Bilateral leg edema  Benign hypertension with CKD (chronic kidney disease) stage III  Dementia without behavioral disturbance, unspecified dementia type  Personal history of DVT (deep vein thrombosis)     Patient found in room sitting up with feet elevated on bed. Reports mild tenderness yet to bilateral LE but states \"I think they are less swollen\". Kerlix on, but ace wraps off at this time. Patient denies SOB at this time. VSS, weights trending down.    ALLERGIES: Amoxicillin  Past Medical, Surgical, Family and Social History reviewed and updated in EPIC.    Current Outpatient Prescriptions   Medication Sig Dispense Refill     warfarin (COUMADIN) 2 MG tablet Take 5 mg by mouth daily on Tues, Thurs, Sat AND take 2.5mg on Sun, Mon, Wed, Fri until 03/05; check INR 03/06       HydrALAZINE HCl (APRESOLINE PO) Take 15 mg by mouth 3 times daily        ACETAMINOPHEN PO Take 1,000 mg by mouth 3 times daily        ARTIFICIAL TEAR OP Place 1 drop into both eyes 4 times daily ; and instill 1 drop in both eyes PRN       Torsemide (DEMADEX PO) Take 0.5 mg by mouth daily        ammonium lactate (LAC-HYDRIN) 12 % lotion Apply topically 2 times daily Apply to All extremities topically every day and evening shift for Dry Skin       OXYCODONE HCL PO Take 2.5 mg by mouth 3 times daily 6-12-6       polyethylene glycol (MIRALAX/GLYCOLAX) packet Take 1 packet by mouth daily       gabapentin (NEURONTIN) 100 MG capsule Take 300 mg by mouth 2 times daily        VITAMIN D, CHOLECALCIFEROL, PO Take 1,000 Units by mouth daily       multivitamin (OCUVITE) TABS Take 1 tablet by mouth daily       atenolol (TENORMIN) 25 MG tablet Take 25 " mg by mouth daily.       Medications reviewed:  Medications reconciled to facility chart and changes were made to reflect current medications as identified as above med list. Below are the changes that were made:   Medications stopped since last EPIC medication reconciliation:   There are no discontinued medications.    Medications started since last [facility] medication reconciliation:  No orders of the defined types were placed in this encounter.        REVIEW OF SYSTEMS:  4 point ROS including Respiratory, CV, GI and , other than that noted in the HPI,  is negative    Physical Exam:  /84  Pulse 88  Temp 97.6  F (36.4  C)  Resp 18  Wt 147 lb (66.7 kg)  SpO2 95%  BMI 25.23 kg/m2    Resp: Effort WNL, LSCTA   CV: S1-2, no S3-4, no murmurs noted- 2++ LE edema  Abd- soft, nontender, BS +  Musc- DAVE- w/c dep  Psych- alert, calm, pleasant       BP 02/20-03/05: 114-192/61-95 mmHg  Weights:  03/02: 147.0lbs  02/28: 155.6lbs  02/26: 151.0lbs  02/19: 148.4lbs   02/05: 143.8lbs  01/29: 144.2lbs     Recent Labs:     CBC RESULTS:   Recent Labs   Lab Test  12/21/17   0620  11/27/17   0900   12/05/16   0955   WBC  5.0   --    --   12.9*   RBC  3.71*   --    --   3.26*   HGB  11.8  13.3   < >  10.8*   HCT  36.5   --    --   33.2*   MCV  98   --    --   102*   MCH  31.8   --    --   33.1*   MCHC  32.3   --    --   32.5   RDW  14.0   --    --   13.1   PLT  208   --    --   147*    < > = values in this interval not displayed.       Last Basic Metabolic Panel:  Recent Labs   Lab Test  03/02/18   0815  02/13/18   0555   NA  142  142   POTASSIUM  4.2  4.8   CHLORIDE  105  105   HEIDY  8.9  9.0   CO2  32  34*   BUN  24  28   CR  1.23*  1.35*   GLC  83  72       Assessment/Plan:    Bilateral leg edema  - again- clarify with nsg need to perform compression wraps daily and put on first thing in a.m.  As well nsg to assist patient to elevate bilateral LE TID  - continue on Bumex 0.5 mg daily  - BMP next week  - follow weights and  edema closely     Benign hypertension with CKD (chronic kidney disease) stage III  - Baseline Cr 1.2- 1.3- currently stable  - added Bumex as above, but will check BMP again next week     Dementia without behavioral disturbance, unspecified dementia type  - moderate impairment  - Progressive disease, continued global decline is anticipated.  - Continue supportive cares and treatments in LTC. Ongoing advanced care planning.        Personal history of DVT (deep vein thrombosis)  - continue on warfarin - gaol INR 2-3  - observe for s/s bleeding      Electronically signed by  ANN MARIE Blunt CNP

## 2018-03-07 ENCOUNTER — DOCUMENTATION ONLY (OUTPATIENT)
Dept: OTHER | Facility: CLINIC | Age: 83
End: 2018-03-07

## 2018-03-08 ENCOUNTER — HOSPITAL LABORATORY (OUTPATIENT)
Dept: OTHER | Facility: CLINIC | Age: 83
End: 2018-03-08

## 2018-03-08 LAB
ANION GAP SERPL CALCULATED.3IONS-SCNC: 4 MMOL/L (ref 3–14)
BUN SERPL-MCNC: 25 MG/DL (ref 7–30)
CALCIUM SERPL-MCNC: 9 MG/DL (ref 8.5–10.1)
CHLORIDE SERPL-SCNC: 104 MMOL/L (ref 94–109)
CO2 SERPL-SCNC: 33 MMOL/L (ref 20–32)
CREAT SERPL-MCNC: 1.28 MG/DL (ref 0.52–1.04)
GFR SERPL CREATININE-BSD FRML MDRD: 38 ML/MIN/1.7M2
GLUCOSE SERPL-MCNC: 75 MG/DL (ref 70–99)
POTASSIUM SERPL-SCNC: 4.2 MMOL/L (ref 3.4–5.3)
SODIUM SERPL-SCNC: 141 MMOL/L (ref 133–144)

## 2018-04-04 VITALS
WEIGHT: 147.6 LBS | DIASTOLIC BLOOD PRESSURE: 72 MMHG | SYSTOLIC BLOOD PRESSURE: 117 MMHG | OXYGEN SATURATION: 94 % | BODY MASS INDEX: 25.2 KG/M2 | HEART RATE: 75 BPM | HEIGHT: 64 IN | RESPIRATION RATE: 16 BRPM | TEMPERATURE: 98.7 F

## 2018-04-06 ENCOUNTER — HOSPITAL LABORATORY (OUTPATIENT)
Dept: OTHER | Facility: CLINIC | Age: 83
End: 2018-04-06

## 2018-04-06 ENCOUNTER — NURSING HOME VISIT (OUTPATIENT)
Dept: GERIATRICS | Facility: CLINIC | Age: 83
End: 2018-04-06
Payer: COMMERCIAL

## 2018-04-06 DIAGNOSIS — R60.0 BILATERAL LEG EDEMA: Primary | ICD-10-CM

## 2018-04-06 DIAGNOSIS — Z86.718 HISTORY OF DEEP VENOUS THROMBOSIS: ICD-10-CM

## 2018-04-06 DIAGNOSIS — F03.90 DEMENTIA WITHOUT BEHAVIORAL DISTURBANCE, UNSPECIFIED DEMENTIA TYPE: ICD-10-CM

## 2018-04-06 LAB
ANION GAP SERPL CALCULATED.3IONS-SCNC: 5 MMOL/L (ref 3–14)
BUN SERPL-MCNC: 34 MG/DL (ref 7–30)
CALCIUM SERPL-MCNC: 8.8 MG/DL (ref 8.5–10.1)
CHLORIDE SERPL-SCNC: 104 MMOL/L (ref 94–109)
CO2 SERPL-SCNC: 32 MMOL/L (ref 20–32)
CREAT SERPL-MCNC: 1.4 MG/DL (ref 0.52–1.04)
GFR SERPL CREATININE-BSD FRML MDRD: 34 ML/MIN/1.7M2
GLUCOSE SERPL-MCNC: 105 MG/DL (ref 70–99)
POTASSIUM SERPL-SCNC: 4.7 MMOL/L (ref 3.4–5.3)
SODIUM SERPL-SCNC: 141 MMOL/L (ref 133–144)

## 2018-04-06 PROCEDURE — 99309 SBSQ NF CARE MODERATE MDM 30: CPT | Performed by: INTERNAL MEDICINE

## 2018-04-06 NOTE — PROGRESS NOTES
[place] [facility]  Nursing Home Regulatory Visit  April 6, 2018    Chief Complaint   Patient presents with     long-term Regulatory       HPI:    Celeste Hawthorne is a 89 year old  (7/30/1915), who is being seen today for a federally mandated E/M visit at [facility]. Today's concerns are:    1) Bilateral LE Edema, Improving -- Previously on torsemide, now on bumetanide 0.5 mg daily with improvement in her LE edema. Today, mostly involving dorsum of her feet.   2) DVT -- by history. Anticoagulated with warfarin. No recent falls.   3) Dementia Without Behavioral Disturbance -- Oriented to self. BIMS 9/15. Chronic. Progressive. Requires 24/7 cares.     ALLERGIES: Amoxicillin    PAST MEDICAL HISTORY:   Past Medical History:   Diagnosis Date     Anemia      Anemia due to blood loss, acute 5/14/2012     Breast cancer (H)      Constipation      History of blood transfusion      Hypertension      Left shoulder pain      Macular degeneration      Malignant neoplasm (H) 1981    right breast       PAST SURGICAL HISTORY:   Past Surgical History:   Procedure Laterality Date     BREAST SURGERY  1981    right mastectomy     ESOPHAGOSCOPY  5/11/2012    Procedure:ESOPHAGOSCOPY; ESOPHAGOSCOPY; Surgeon:ALBARO GARCIA; Location: GI     EYE SURGERY      cataract removal     HEMORRHOIDECTOMY       OPEN REDUCTION INTERNAL FIXATION FEMUR DISTAL  5/8/2012    Procedure:OPEN REDUCTION INTERNAL FIXATION FEMUR DISTAL; Open Reduction Internal Fixation Right Femur ; Surgeon:ALBARO HAZEL; Location: OR     REPAIR ESOPHAGEAL STRICTURE      dilation     REVERSE ARTHROPLASTY SHOULDER Left 8/19/2014    Procedure: REVERSE ARTHROPLASTY SHOULDER;  Surgeon: Albaro Hazel MD;  Location:  OR       FAMILY HISTORY:   Family History   Problem Relation Age of Onset     Other Cancer Son        SOCIAL HISTORY:   Lives in a SNF    MEDICATIONS:  Current Outpatient Prescriptions   Medication Sig Dispense Refill     Bumetanide  "(BUMEX PO) Take 0.5 mg by mouth every morning       warfarin (COUMADIN) 2 MG tablet Take 5 mg by mouth daily on Tues, Thurs, Sat AND take 2.5mg on Sun, Mon, Wed, Fri until 04/09; check INR 04/10       HydrALAZINE HCl (APRESOLINE PO) Take 15 mg by mouth 3 times daily        ACETAMINOPHEN PO Take 1,000 mg by mouth 3 times daily        ARTIFICIAL TEAR OP Place 1 drop into both eyes 4 times daily ; and instill 1 drop in both eyes PRN       ammonium lactate (LAC-HYDRIN) 12 % lotion Apply topically 2 times daily Apply to All extremities topically every day and evening shift for Dry Skin       OXYCODONE HCL PO Take 2.5 mg by mouth 3 times daily 6-12-6       polyethylene glycol (MIRALAX/GLYCOLAX) packet Take 1 packet by mouth daily       gabapentin (NEURONTIN) 100 MG capsule Take 300 mg by mouth 2 times daily        VITAMIN D, CHOLECALCIFEROL, PO Take 1,000 Units by mouth daily       multivitamin (OCUVITE) TABS Take 1 tablet by mouth daily       atenolol (TENORMIN) 25 MG tablet Take 25 mg by mouth daily.         Medications reviewed:  Medications reconciled to facility chart and changes were made to reflect current medications as identified as above med list. Below are the changes that were made:   Medications stopped since last EPIC medication reconciliation:   Medications Discontinued During This Encounter   Medication Reason     Torsemide (DEMADEX PO) Discontinued by another Health Care Provider     Medications started since last [facility] medication reconciliation:  Orders Placed This Encounter   Medications     Bumetanide (BUMEX PO)     Sig: Take 0.5 mg by mouth every morning       Case Management:  I have reviewed the care plan and MDS and do agree with the plan.   Information reviewed:  Medications, vital signs, orders, and nursing notes.    ROS:  Unable to be obtained due to cognitive impairment or aphasia.     PHYSICAL EXAM:  /72  Pulse 75  Temp 98.7  F (37.1  C)  Resp 16  Ht 5' 4\" (1.626 m)  Wt 147 lb 9.6 oz " (67 kg)  SpO2 94%  BMI 25.34 kg/m2  Gen: sitting in wheelchair, alert and in no acute distress  Resp: breathing non-labored  GI: abdomen soft, not-tender  Ext: 2+ edema of dorsum of bilateral feet, 1+ bilateral LE edema  Neuro: CX II-XII grossly in tact; ROM in all four extremities grossly in tact  Psych: memory, judgement and insight impaired    Lab/Diagnostic data:  Reviewed as per Epic    ASSESSMENT/PLAN    1) Bilateral LE Edema, Improving   Previously on torsemide, now on bumetanide with improvement in her LE edema. Today, mostly involving dorsum of her feet.   -- continues on bumetanide 0.5 mg daily    2) DVT   By history. No recent falls.   -- anticoagulated with warfarin, goal INR 2-3    3) Dementia Without Behavioral Disturbance   Oriented to self. BIMS 9/15. Chronic. Progressive.   -- ongoing 24/7 nursing and supportive cares    Electronically signed by:  Mima Sosa MD

## 2018-04-06 NOTE — LETTER
4/6/2018        RE: Celeste Hawthorne  [facility]  [address] [address] [address]        [facility]  Nursing Home Regulatory Visit  April 6, 2018    Chief Complaint   Patient presents with     longterm Regulatory       HPI:    Celeste Hawthorne is a 89 year old  (7/30/1915), who is being seen today for a federally mandated E/M visit at [facility]. Today's concerns are:    1) Bilateral LE Edema, Improving -- Previously on torsemide, now on bumetanide 0.5 mg daily with improvement in her LE edema. Today, mostly involving dorsum of her feet.   2) DVT -- by history. Anticoagulated with warfarin. No recent falls.   3) Dementia Without Behavioral Disturbance -- Oriented to self. BIMS 9/15. Chronic. Progressive. Requires 24/7 cares.     ALLERGIES: Amoxicillin    PAST MEDICAL HISTORY:   Past Medical History:   Diagnosis Date     Anemia      Anemia due to blood loss, acute 5/14/2012     Breast cancer (H)      Constipation      History of blood transfusion      Hypertension      Left shoulder pain      Macular degeneration      Malignant neoplasm (H) 1981    right breast       PAST SURGICAL HISTORY:   Past Surgical History:   Procedure Laterality Date     BREAST SURGERY  1981    right mastectomy     ESOPHAGOSCOPY  5/11/2012    Procedure:ESOPHAGOSCOPY; ESOPHAGOSCOPY; Surgeon:ALBARO GARCIA; Location: GI     EYE SURGERY      cataract removal     HEMORRHOIDECTOMY       OPEN REDUCTION INTERNAL FIXATION FEMUR DISTAL  5/8/2012    Procedure:OPEN REDUCTION INTERNAL FIXATION FEMUR DISTAL; Open Reduction Internal Fixation Right Femur ; Surgeon:ALBARO MILLER; Location: OR     REPAIR ESOPHAGEAL STRICTURE      dilation     REVERSE ARTHROPLASTY SHOULDER Left 8/19/2014    Procedure: REVERSE ARTHROPLASTY SHOULDER;  Surgeon: Albaro Miller MD;  Location: [facility] OR       FAMILY HISTORY:   Family History   Problem Relation Age of Onset     Other Cancer Son        SOCIAL HISTORY:    Lives in a SNF    MEDICATIONS:  Current Outpatient Prescriptions   Medication Sig Dispense Refill     Bumetanide (BUMEX PO) Take 0.5 mg by mouth every morning       warfarin (COUMADIN) 2 MG tablet Take 5 mg by mouth daily on Tues, Thurs, Sat AND take 2.5mg on Sun, Mon, Wed, Fri until 04/09; check INR 04/10       HydrALAZINE HCl (APRESOLINE PO) Take 15 mg by mouth 3 times daily        ACETAMINOPHEN PO Take 1,000 mg by mouth 3 times daily        ARTIFICIAL TEAR OP Place 1 drop into both eyes 4 times daily ; and instill 1 drop in both eyes PRN       ammonium lactate (LAC-HYDRIN) 12 % lotion Apply topically 2 times daily Apply to All extremities topically every day and evening shift for Dry Skin       OXYCODONE HCL PO Take 2.5 mg by mouth 3 times daily 6-12-6       polyethylene glycol (MIRALAX/GLYCOLAX) packet Take 1 packet by mouth daily       gabapentin (NEURONTIN) 100 MG capsule Take 300 mg by mouth 2 times daily        VITAMIN D, CHOLECALCIFEROL, PO Take 1,000 Units by mouth daily       multivitamin (OCUVITE) TABS Take 1 tablet by mouth daily       atenolol (TENORMIN) 25 MG tablet Take 25 mg by mouth daily.         Medications reviewed:  Medications reconciled to facility chart and changes were made to reflect current medications as identified as above med list. Below are the changes that were made:   Medications stopped since last EPIC medication reconciliation:   Medications Discontinued During This Encounter   Medication Reason     Torsemide (DEMADEX PO) Discontinued by another Health Care Provider     Medications started since last [facility] medication reconciliation:  Orders Placed This Encounter   Medications     Bumetanide (BUMEX PO)     Sig: Take 0.5 mg by mouth every morning       Case Management:  I have reviewed the care plan and MDS and do agree with the plan.   Information reviewed:  Medications, vital signs, orders, and nursing notes.    ROS:  Unable to be obtained due to cognitive impairment or aphasia.  "    PHYSICAL EXAM:  /72  Pulse 75  Temp 98.7  F (37.1  C)  Resp 16  Ht 5' 4\" (1.626 m)  Wt 147 lb 9.6 oz (67 kg)  SpO2 94%  BMI 25.34 kg/m2  Gen: sitting in wheelchair, alert and in no acute distress  Resp: breathing non-labored  GI: abdomen soft, not-tender  Ext: 2+ edema of dorsum of bilateral feet, 1+ bilateral LE edema  Neuro: CX II-XII grossly in tact; ROM in all four extremities grossly in tact  Psych: memory, judgement and insight impaired    Lab/Diagnostic data:  Reviewed as per Epic    ASSESSMENT/PLAN    1) Bilateral LE Edema, Improving   Previously on torsemide, now on bumetanide with improvement in her LE edema. Today, mostly involving dorsum of her feet.   -- continues on bumetanide 0.5 mg daily    2) DVT   By history. No recent falls.   -- anticoagulated with warfarin, goal INR 2-3    3) Dementia Without Behavioral Disturbance   Oriented to self. BIMS 9/15. Chronic. Progressive.   -- ongoing 24/7 nursing and supportive cares    Electronically signed by:  Mima Sosa MD        "

## 2018-04-12 ENCOUNTER — HOSPITAL LABORATORY (OUTPATIENT)
Dept: OTHER | Facility: CLINIC | Age: 83
End: 2018-04-12

## 2018-04-12 LAB
ANION GAP SERPL CALCULATED.3IONS-SCNC: 6 MMOL/L (ref 3–14)
BUN SERPL-MCNC: 28 MG/DL (ref 7–30)
CALCIUM SERPL-MCNC: 8.8 MG/DL (ref 8.5–10.1)
CHLORIDE SERPL-SCNC: 107 MMOL/L (ref 94–109)
CO2 SERPL-SCNC: 31 MMOL/L (ref 20–32)
CREAT SERPL-MCNC: 1.37 MG/DL (ref 0.52–1.04)
GFR SERPL CREATININE-BSD FRML MDRD: 35 ML/MIN/1.7M2
GLUCOSE SERPL-MCNC: 76 MG/DL (ref 70–99)
POTASSIUM SERPL-SCNC: 3.7 MMOL/L (ref 3.4–5.3)
SODIUM SERPL-SCNC: 144 MMOL/L (ref 133–144)

## 2018-05-01 ENCOUNTER — NURSING HOME VISIT (OUTPATIENT)
Dept: GERIATRICS | Facility: CLINIC | Age: 83
End: 2018-05-01
Payer: COMMERCIAL

## 2018-05-01 VITALS
TEMPERATURE: 97.8 F | BODY MASS INDEX: 25.46 KG/M2 | WEIGHT: 148.3 LBS | OXYGEN SATURATION: 92 % | RESPIRATION RATE: 18 BRPM | DIASTOLIC BLOOD PRESSURE: 78 MMHG | HEART RATE: 86 BPM | SYSTOLIC BLOOD PRESSURE: 159 MMHG

## 2018-05-01 DIAGNOSIS — R60.0 BILATERAL LEG EDEMA: Primary | ICD-10-CM

## 2018-05-01 DIAGNOSIS — M79.604 PAIN OF RIGHT LOWER EXTREMITY: ICD-10-CM

## 2018-05-01 DIAGNOSIS — N18.30 BENIGN HYPERTENSION WITH CKD (CHRONIC KIDNEY DISEASE) STAGE III (H): ICD-10-CM

## 2018-05-01 DIAGNOSIS — I12.9 BENIGN HYPERTENSION WITH CKD (CHRONIC KIDNEY DISEASE) STAGE III (H): ICD-10-CM

## 2018-05-01 PROCEDURE — 99309 SBSQ NF CARE MODERATE MDM 30: CPT | Performed by: NURSE PRACTITIONER

## 2018-05-01 NOTE — PROGRESS NOTES
[place] [facility]    Chief Complaint   Patient presents with     Edema       [place] Medical Record Number:  [id number]    HPI:    Celeste Hawthorne is a 89 year old  (7/30/1915), who is being seen today for an episodic care visit at [facility].  HPI information obtained from: facility chart records.Today's concern is:     Bilateral leg edema  Benign hypertension with CKD (chronic kidney disease) stage III  Pain of right lower extremity     - attempted to back off on Bumex 2/2 CKD, but now weights trending up and LE edema worsening. Patient reports mild orthopnea (chronic). No open areas or blisters to LE and patient has them elevated on bed. Patient also reports noting increase in pain to right low back, hip and RLE, unrelieved on current pain regimen     ALLERGIES: Amoxicillin  Past Medical, Surgical, Family and Social History reviewed and updated in EPIC.    Current Outpatient Prescriptions   Medication Sig Dispense Refill     ACETAMINOPHEN PO Take 1,000 mg by mouth 3 times daily        ammonium lactate (LAC-HYDRIN) 12 % lotion Apply topically 2 times daily Apply to All extremities topically every day and evening shift for Dry Skin       ARTIFICIAL TEAR OP Place 1 drop into both eyes 4 times daily ; and instill 1 drop in both eyes PRN       atenolol (TENORMIN) 25 MG tablet Take 25 mg by mouth daily.       Bumetanide (BUMEX PO) Take 0.5 mg by mouth every other day        gabapentin (NEURONTIN) 100 MG capsule Take 300 mg by mouth 2 times daily        HydrALAZINE HCl (APRESOLINE PO) Take 15 mg by mouth 3 times daily        multivitamin (OCUVITE) TABS Take 1 tablet by mouth daily       OXYCODONE HCL PO Take 2.5 mg by mouth 3 times daily 6-12-6       polyethylene glycol (MIRALAX/GLYCOLAX) packet Take 1 packet by mouth daily       VITAMIN D, CHOLECALCIFEROL, PO Take 1,000 Units by mouth daily       warfarin (COUMADIN) 2 MG tablet Take 5 mg by mouth daily on Tues, Thurs, Sat AND take  2.5mg on Sun, Mon, Wed, Fri until 05/07 check INR 05/08       Medications reviewed:  Medications reconciled to facility chart and changes were made to reflect current medications as identified as above med list. Below are the changes that were made:   Medications stopped since last EPIC medication reconciliation:   There are no discontinued medications.    Medications started since last [facility] medication reconciliation:  No orders of the defined types were placed in this encounter.        REVIEW OF SYSTEMS:  4 point ROS including Respiratory, CV, GI and , other than that noted in the HPI,  is negative    Physical Exam:  /78  Pulse 86  Temp 97.8  F (36.6  C)  Resp 18  Wt 148 lb 4.8 oz (67.3 kg)  SpO2 92%  BMI 25.46 kg/m2     Resp: Effort WNL, LS decreased in bilateral bases  CV: S1-2, no S3-4, no murmurs noted- 2-3+ pitting bilateral LE edema  Abd- soft, nontender, BS +  Musc- DAVE  Psych- alert, calm, pleasant      Weights:  04/30: 148.3lbs  04/27: 141.8lbs  04/18: 141 lbs  04/16: 141.2lbs    Recent Labs:     CBC RESULTS:   Recent Labs   Lab Test  12/21/17   0620  11/27/17   0900   12/05/16   0955   WBC  5.0   --    --   12.9*   RBC  3.71*   --    --   3.26*   HGB  11.8  13.3   < >  10.8*   HCT  36.5   --    --   33.2*   MCV  98   --    --   102*   MCH  31.8   --    --   33.1*   MCHC  32.3   --    --   32.5   RDW  14.0   --    --   13.1   PLT  208   --    --   147*    < > = values in this interval not displayed.       Last Basic Metabolic Panel:  Recent Labs   Lab Test  04/12/18   0720  04/06/18   1140   NA  144  141   POTASSIUM  3.7  4.7   CHLORIDE  107  104   HEIDY  8.8  8.8   CO2  31  32   BUN  28  34*   CR  1.37*  1.40*   GLC  76  105*       Liver Function Studies -   Recent Labs   Lab Test  03/31/15   1958   PROTTOTAL  6.2*   ALBUMIN  3.4   BILITOTAL  0.4   ALKPHOS  52   AST  21   ALT  16         Assessment/Plan:  Bilateral leg edema  Benign hypertension with CKD (chronic kidney disease) stage III  -  increase Bumex back to 0.5 mg daily  - continue wraps and elevation/assessment to bilateral LE  - BMP in a week    Pain of right lower extremity  - increase low dose oxycodone to 2.5 mg (is tolerating without noted adverse effects)  - and add 2.5 mg oxyodone q 8 hours prn    Call placed by writer to patient's daughter Candy and message left updating on patient status and POC       Electronically signed by  ANN MARIE Blunt CNP

## 2018-05-07 ENCOUNTER — NURSING HOME VISIT (OUTPATIENT)
Dept: GERIATRICS | Facility: CLINIC | Age: 83
End: 2018-05-07
Payer: COMMERCIAL

## 2018-05-07 VITALS
HEIGHT: 64 IN | TEMPERATURE: 98.7 F | WEIGHT: 147.6 LBS | RESPIRATION RATE: 18 BRPM | BODY MASS INDEX: 25.2 KG/M2 | DIASTOLIC BLOOD PRESSURE: 73 MMHG | SYSTOLIC BLOOD PRESSURE: 114 MMHG | HEART RATE: 80 BPM | OXYGEN SATURATION: 94 %

## 2018-05-07 DIAGNOSIS — R60.0 BILATERAL LEG EDEMA: ICD-10-CM

## 2018-05-07 DIAGNOSIS — R06.02 SOB (SHORTNESS OF BREATH): Primary | ICD-10-CM

## 2018-05-07 DIAGNOSIS — I12.9 BENIGN HYPERTENSION WITH CKD (CHRONIC KIDNEY DISEASE) STAGE III (H): ICD-10-CM

## 2018-05-07 DIAGNOSIS — N18.30 BENIGN HYPERTENSION WITH CKD (CHRONIC KIDNEY DISEASE) STAGE III (H): ICD-10-CM

## 2018-05-07 PROCEDURE — 99309 SBSQ NF CARE MODERATE MDM 30: CPT | Performed by: NURSE PRACTITIONER

## 2018-05-07 NOTE — PROGRESS NOTES
[place] [facility]    Chief Complaint   Patient presents with     Edema     Hypertension       [place] Medical Record Number:  [id number]    HPI:    Celeste Hawthorne is a 89 year old  (7/30/1915), who is being seen today for an episodic care visit at [facility].  HPI information obtained from: facility chart records.Today's concern is:     SOB (shortness of breath)  Bilateral leg edema  Benign hypertension with CKD (chronic kidney disease) stage III     SOB over w/e- weights have been up. O2 applied. Is now back on daily Bumex (since late last week.) Some hypotensino of late and nsg intermittently holding hydralazine  Currently denies SOB, chest pain, dizziness. Is wearing O2. Denies new cough, congestion. Denies fever or chills.     ALLERGIES: Amoxicillin  Past Medical, Surgical, Family and Social History reviewed and updated in [employer].    Current Outpatient Prescriptions   Medication Sig Dispense Refill     ACETAMINOPHEN PO Take 1,000 mg by mouth 3 times daily        ammonium lactate (LAC-HYDRIN) 12 % lotion Apply topically 2 times daily Apply to All extremities topically every day and evening shift for Dry Skin       ARTIFICIAL TEAR OP Place 1 drop into both eyes 4 times daily ; and instill 1 drop in both eyes PRN       atenolol (TENORMIN) 25 MG tablet Take 25 mg by mouth daily.       Bumetanide (BUMEX PO) Take 0.5 mg by mouth daily       gabapentin (NEURONTIN) 100 MG capsule Take 300 mg by mouth 2 times daily        HydrALAZINE HCl (APRESOLINE PO) Take 15 mg by mouth 3 times daily        multivitamin (OCUVITE) TABS Take 1 tablet by mouth daily       OXYCODONE HCL PO Take 2.5 mg by mouth 4 times daily and q 8 hours prn       polyethylene glycol (MIRALAX/GLYCOLAX) packet Take 1 packet by mouth daily       VITAMIN D, CHOLECALCIFEROL, PO Take 1,000 Units by mouth daily       warfarin (COUMADIN) 2 MG tablet Take 5 mg by mouth daily on Tues, Thurs, Sat AND take 2.5mg on Sun, Mon,  "Wed, Fri until 05/07 check INR 05/08       Medications reviewed:  Medications reconciled to facility chart and changes were made to reflect current medications as identified as above med list. Below are the changes that were made:   Medications stopped since last EPIC medication reconciliation:   There are no discontinued medications.    Medications started since last [facility] medication reconciliation:  No orders of the defined types were placed in this encounter.        REVIEW OF SYSTEMS:  4 point ROS including Respiratory, CV, GI and , other than that noted in the HPI,  is negative    Physical Exam:  /73  Pulse 80  Temp 98.7  F (37.1  C)  Resp 18  Ht 5' 4\" (1.626 m)  Wt 147 lb 9.6 oz (67 kg)  SpO2 94%  BMI 25.34 kg/m2    Resp: Effort WNL, LSCTA - diminished in bases  CV: S1-2, no S3-4, no murmurs noted- 2+ LE, ace wraps on  Abd- soft, nontender, BS +  Musc- DAVE  Psych- alert, calm, pleasant      BP 05/01-05/07: /58-85 mmHg    Recent Labs:     CBC RESULTS:   Recent Labs   Lab Test  12/21/17   0620  11/27/17   0900   12/05/16   0955   WBC  5.0   --    --   12.9*   RBC  3.71*   --    --   3.26*   HGB  11.8  13.3   < >  10.8*   HCT  36.5   --    --   33.2*   MCV  98   --    --   102*   MCH  31.8   --    --   33.1*   MCHC  32.3   --    --   32.5   RDW  14.0   --    --   13.1   PLT  208   --    --   147*    < > = values in this interval not displayed.       Last Basic Metabolic Panel:  Recent Labs   Lab Test  04/12/18   0720  04/06/18   1140   NA  144  141   POTASSIUM  3.7  4.7   CHLORIDE  107  104   HEIDY  8.8  8.8   CO2  31  32   BUN  28  34*   CR  1.37*  1.40*   GLC  76  105*       Assessment/Plan:    SOB   Acute on chronic  - ? 2/2 hypervolemia- weights up. No s/s infection/URI  - continue on increased dose of Bumex and give one time bump 0.5 mg today  - O2 1-2 LNC and wean as able to keep sats > 90%    Bilateral leg edema  - continue on daily bumex, with wraps and elevation    Benign hypertension " with CKD (chronic kidney disease) stage III  - controlled/baseline  - continue on current regimen  - periodic BMP  - VS per unit protocol          Electronically signed by  ANN MARIE Blunt CNP

## 2018-05-08 ENCOUNTER — HOSPITAL LABORATORY (OUTPATIENT)
Dept: OTHER | Facility: CLINIC | Age: 83
End: 2018-05-08

## 2018-05-08 LAB
ANION GAP SERPL CALCULATED.3IONS-SCNC: 3 MMOL/L (ref 3–14)
BUN SERPL-MCNC: 25 MG/DL (ref 7–30)
CALCIUM SERPL-MCNC: 8.6 MG/DL (ref 8.5–10.1)
CHLORIDE SERPL-SCNC: 106 MMOL/L (ref 94–109)
CO2 SERPL-SCNC: 34 MMOL/L (ref 20–32)
CREAT SERPL-MCNC: 1.27 MG/DL (ref 0.52–1.04)
GFR SERPL CREATININE-BSD FRML MDRD: 38 ML/MIN/1.7M2
GLUCOSE SERPL-MCNC: 73 MG/DL (ref 70–99)
POTASSIUM SERPL-SCNC: 4 MMOL/L (ref 3.4–5.3)
SODIUM SERPL-SCNC: 143 MMOL/L (ref 133–144)

## 2018-05-14 ENCOUNTER — NURSING HOME VISIT (OUTPATIENT)
Dept: GERIATRICS | Facility: CLINIC | Age: 83
End: 2018-05-14
Payer: COMMERCIAL

## 2018-05-14 VITALS
HEART RATE: 80 BPM | HEIGHT: 64 IN | OXYGEN SATURATION: 92 % | SYSTOLIC BLOOD PRESSURE: 118 MMHG | DIASTOLIC BLOOD PRESSURE: 62 MMHG | TEMPERATURE: 98 F | BODY MASS INDEX: 25.29 KG/M2 | WEIGHT: 148.1 LBS | RESPIRATION RATE: 18 BRPM

## 2018-05-14 DIAGNOSIS — R60.0 BILATERAL LEG EDEMA: ICD-10-CM

## 2018-05-14 DIAGNOSIS — R06.02 SOB (SHORTNESS OF BREATH): Primary | ICD-10-CM

## 2018-05-14 DIAGNOSIS — Z86.718 PERSONAL HISTORY OF DVT (DEEP VEIN THROMBOSIS): ICD-10-CM

## 2018-05-14 DIAGNOSIS — N18.30 CKD (CHRONIC KIDNEY DISEASE) STAGE 3, GFR 30-59 ML/MIN (H): ICD-10-CM

## 2018-05-14 PROCEDURE — 99309 SBSQ NF CARE MODERATE MDM 30: CPT | Performed by: NURSE PRACTITIONER

## 2018-05-14 NOTE — PROGRESS NOTES
[facility]    Chief Complaint   Patient presents with     Edema     Shortness of Breath       [facility] Medical Record Number:  [id number]    HPI:    Celeste Hawthorne is a 89 year old  (7/30/1915), who is being seen today for an episodic care visit at [facility].  HPI information obtained from: facility chart records.Today's concern is:     SOB (shortness of breath)  Bilateral leg edema  CKD (chronic kidney disease) stage 3, GFR 30-59 ml/min  Personal history of DVT (deep vein thrombosis)     - weights remain up, chronic LE edema and SOB - is on O2. On warfarin for h/o right LE DVT. Bumex increased to daily beginning of month. Comfort care goals. Alert, calm, NAD. Reports mild SOB and intermittent RLE pain. Reports is currently comfortable.     ALLERGIES: Amoxicillin  Past Medical, Surgical, Family and Social History reviewed and updated in [facility].    Current Outpatient Prescriptions   Medication Sig Dispense Refill     ACETAMINOPHEN PO Take 1,000 mg by mouth 3 times daily        ammonium lactate (LAC-HYDRIN) 12 % lotion Apply topically 2 times daily Apply to All extremities topically every day and evening shift for Dry Skin       ARTIFICIAL TEAR OP Place 1 drop into both eyes 4 times daily ; and instill 1 drop in both eyes PRN       atenolol (TENORMIN) 25 MG tablet Take 25 mg by mouth daily.       Bumetanide (BUMEX PO) Take 0.5 mg by mouth daily       gabapentin (NEURONTIN) 100 MG capsule Take 300 mg by mouth 2 times daily        HydrALAZINE HCl (APRESOLINE PO) Take 10 mg by mouth 3 times daily        multivitamin (OCUVITE) TABS Take 1 tablet by mouth daily       OXYCODONE HCL PO Take 2.5 mg by mouth 4 times daily and q 8 hours prn       polyethylene glycol (MIRALAX/GLYCOLAX) packet Take 1 packet by mouth daily       VITAMIN D, CHOLECALCIFEROL, PO Take 1,000 Units by mouth daily       warfarin (COUMADIN) 2 MG tablet Take 5 mg by mouth daily on Tues, Thurs, Sat AND take 2.5mg  "on Sun, Mon, Wed, Fri until 05/14 check INR 05/15       Medications reviewed:  Medications reconciled to facility chart and changes were made to reflect current medications as identified as above med list. Below are the changes that were made:   Medications stopped since last EPIC medication reconciliation:   There are no discontinued medications.    Medications started since last [facility] medication reconciliation:  No orders of the defined types were placed in this encounter.        REVIEW OF SYSTEMS:  4 point ROS including Respiratory, CV, GI and , other than that noted in the HPI,  is negative    Physical Exam:  /62  Pulse 80  Temp 98  F (36.7  C)  Resp 18  Ht 5' 4\" (1.626 m)  Wt 148 lb 1.6 oz (67.2 kg)  SpO2 92%  BMI 25.42 kg/m2    Resp: Effort WNL, LS decreased in bases  CV: S1-2, no S3-4, no murmurs noted- chronic LE edema 2_ > right  Abd- soft, nontender, BS +  Musc- DAVE  Psych- alert, calm, pleasant      BP 05/07-05/14: 107-156/ 62-87 mmHg    Recent Labs:     CBC RESULTS:   Recent Labs   Lab Test  12/21/17   0620  11/27/17   0900   12/05/16   0955   WBC  5.0   --    --   12.9*   RBC  3.71*   --    --   3.26*   HGB  11.8  13.3   < >  10.8*   HCT  36.5   --    --   33.2*   MCV  98   --    --   102*   MCH  31.8   --    --   33.1*   MCHC  32.3   --    --   32.5   RDW  14.0   --    --   13.1   PLT  208   --    --   147*    < > = values in this interval not displayed.       Last Basic Metabolic Panel:  Recent Labs   Lab Test  05/08/18   0828  04/12/18   0720   NA  143  144   POTASSIUM  4.0  3.7   CHLORIDE  106  107   HEIDY  8.6  8.8   CO2  34*  31   BUN  25  28   CR  1.27*  1.37*   GLC  73  76     Assessment/Plan:  SOB (shortness of breath)  Bilateral leg edema  - weight remain up from baseline with increased SOB, edema. Afebrile  - increase Bumex to 1 mg daily  - follow daily weights.  - recheck BMP    CKD (chronic kidney disease) stage 3, GFR 30-59 ml/min  - cautious increase in diuretic   - BMP as " above    Personal history of DVT (deep vein thrombosis)  - continue on warfarin with INR gaol 2-3        Electronically signed by  ANN MARIE Blunt CNP

## 2018-05-17 ENCOUNTER — HOSPITAL LABORATORY (OUTPATIENT)
Dept: OTHER | Facility: CLINIC | Age: 83
End: 2018-05-17

## 2018-05-17 LAB
ANION GAP SERPL CALCULATED.3IONS-SCNC: 3 MMOL/L (ref 3–14)
BUN SERPL-MCNC: 28 MG/DL (ref 7–30)
CALCIUM SERPL-MCNC: 8.9 MG/DL (ref 8.5–10.1)
CHLORIDE SERPL-SCNC: 105 MMOL/L (ref 94–109)
CO2 SERPL-SCNC: 36 MMOL/L (ref 20–32)
CREAT SERPL-MCNC: 1.1 MG/DL (ref 0.52–1.04)
GFR SERPL CREATININE-BSD FRML MDRD: 45 ML/MIN/1.7M2
GLUCOSE SERPL-MCNC: 82 MG/DL (ref 70–99)
POTASSIUM SERPL-SCNC: 4.8 MMOL/L (ref 3.4–5.3)
SODIUM SERPL-SCNC: 144 MMOL/L (ref 133–144)

## 2018-05-22 ENCOUNTER — NURSING HOME VISIT (OUTPATIENT)
Dept: GERIATRICS | Facility: CLINIC | Age: 83
End: 2018-05-22
Payer: COMMERCIAL

## 2018-05-22 VITALS
RESPIRATION RATE: 18 BRPM | WEIGHT: 149.8 LBS | DIASTOLIC BLOOD PRESSURE: 80 MMHG | TEMPERATURE: 98 F | SYSTOLIC BLOOD PRESSURE: 144 MMHG | BODY MASS INDEX: 25.57 KG/M2 | HEART RATE: 90 BPM | HEIGHT: 64 IN | OXYGEN SATURATION: 94 %

## 2018-05-22 DIAGNOSIS — I82.411 ACUTE DEEP VEIN THROMBOSIS (DVT) OF FEMORAL VEIN OF RIGHT LOWER EXTREMITY (H): ICD-10-CM

## 2018-05-22 DIAGNOSIS — Z51.81 ENCOUNTER FOR THERAPEUTIC DRUG MONITORING: ICD-10-CM

## 2018-05-22 DIAGNOSIS — N18.30 BENIGN HYPERTENSION WITH CKD (CHRONIC KIDNEY DISEASE) STAGE III (H): ICD-10-CM

## 2018-05-22 DIAGNOSIS — Z79.01 LONG-TERM (CURRENT) USE OF ANTICOAGULANTS: ICD-10-CM

## 2018-05-22 DIAGNOSIS — I50.31 ACUTE DIASTOLIC CONGESTIVE HEART FAILURE (H): Primary | ICD-10-CM

## 2018-05-22 DIAGNOSIS — I12.9 BENIGN HYPERTENSION WITH CKD (CHRONIC KIDNEY DISEASE) STAGE III (H): ICD-10-CM

## 2018-05-22 PROCEDURE — 99309 SBSQ NF CARE MODERATE MDM 30: CPT | Performed by: NURSE PRACTITIONER

## 2018-05-22 NOTE — PROGRESS NOTES
[facility]    Chief Complaint   Patient presents with     Heart Failure     INR RESULTS       [facility] Medical Record Number:  [id number]    HPI:    Celeste Hawthorne is a 89 year old  (7/30/1915), who is being seen today for an episodic care visit at [facility].  HPI information obtained from: facility chart records.Today's concern is:     Acute diastolic congestive heart failure (H)  Benign hypertension with CKD (chronic kidney disease) stage III  - weights trending up slightly despite recent increases in diuretic, watching renal fx closely. Patient wearing O2, denies SOB or chest pain at this time,but reports noting intermittent SOB and JACOBSON. LE edema persists, currently wrapped. Patient reports to writer that if unable to tx in NH does not want to go the hospital but wants to be kept comfortable in NH. This is in line with current POLST.    Encounter for therapeutic drug monitoring  Long-term (current) use of anticoagulants  Acute deep vein thrombosis (DVT) of femoral vein of right lower extremity (H)  - current INR supratherapeutic at 5.4- no s/s bleeding have been noted by patient or reported by nursing    ALLERGIES: Amoxicillin  Past Medical, Surgical, Family and Social History reviewed and updated in [facility].    Current Outpatient Prescriptions   Medication Sig Dispense Refill     ACETAMINOPHEN PO Take 1,000 mg by mouth 3 times daily        ammonium lactate (LAC-HYDRIN) 12 % lotion Apply topically 2 times daily Apply to All extremities topically every day and evening shift for Dry Skin       ARTIFICIAL TEAR OP Place 1 drop into both eyes 4 times daily ; and instill 1 drop in both eyes PRN       atenolol (TENORMIN) 25 MG tablet Take 25 mg by mouth daily.       Bumetanide (BUMEX PO) Take 1 mg by mouth daily        gabapentin (NEURONTIN) 100 MG capsule Take 300 mg by mouth 2 times daily        HydrALAZINE HCl (APRESOLINE PO) Take 10 mg by mouth 3 times daily         "multivitamin (OCUVITE) TABS Take 1 tablet by mouth daily       OXYCODONE HCL PO Take 2.5 mg by mouth 4 times daily and q 8 hours prn       polyethylene glycol (MIRALAX/GLYCOLAX) packet Take 1 packet by mouth daily       VITAMIN D, CHOLECALCIFEROL, PO Take 1,000 Units by mouth daily       warfarin (COUMADIN) 2 MG tablet INR 5/22       Medications reviewed:  Medications reconciled to facility chart and changes were made to reflect current medications as identified as above med list. Below are the changes that were made:   Medications stopped since last EPIC medication reconciliation:   There are no discontinued medications.    Medications started since last [facility] medication reconciliation:  No orders of the defined types were placed in this encounter.        REVIEW OF SYSTEMS:  4 point ROS including Respiratory, CV, GI and , other than that noted in the HPI,  is negative    Physical Exam:  /80  Pulse 90  Temp 98  F (36.7  C)  Resp 18  Ht 5' 4\" (1.626 m)  Wt 149 lb 12.8 oz (67.9 kg)  SpO2 94%  BMI 25.71 kg/m2    Resp: Effort WNL, LS decreased in bases bilaterally  CV: S1-2, no S3-4, no murmurs noted- 2++ LE edema, wraps on. VSS as above  Abd- soft, nontender, BS +  Musc- DAVE- w/c dep  Psych- alert, calm, pleasant      Weights:  05/21: 149.8lbs  05/18: 151.3lbs  05/17: 148.0lbs  05/16: 149.6lbs  05/14: 148.1lbs  05/11: 147.6lbs  05/09: 148.8lbs  BP 05/15-05/22: /53-87 mmHg    Recent Labs:     CBC RESULTS:   Recent Labs   Lab Test  12/21/17   0620  11/27/17   0900   12/05/16   0955   WBC  5.0   --    --   12.9*   RBC  3.71*   --    --   3.26*   HGB  11.8  13.3   < >  10.8*   HCT  36.5   --    --   33.2*   MCV  98   --    --   102*   MCH  31.8   --    --   33.1*   MCHC  32.3   --    --   32.5   RDW  14.0   --    --   13.1   PLT  208   --    --   147*    < > = values in this interval not displayed.       Last Basic Metabolic Panel:  Recent Labs   Lab Test  05/17/18   0752  05/08/18   0828   NA  144  143 "   POTASSIUM  4.8  4.0   CHLORIDE  105  106   HEIDY  8.9  8.6   CO2  36*  34*   BUN  28  25   CR  1.10*  1.27*   GLC  82  73     Assessment/Plan:  Acute diastolic congestive heart failure (H)  Benign hypertension with CKD stage II  - weights continue slight up despite slow increase in diuretic, renal fx overall stable.   - continue on current recently increased Bumex and follow weights/VS closely  - periodic BMP  - ultimately goals of care are comfort and patient is reporting desire to be kept comfortable in NH should condition worsen     Encounter for therapeutic drug monitoring  Long-term (current) use of anticoagulants  Acute deep vein thrombosis (DVT) of femoral vein of right lower extremity (H)  - supratherapeutic INR - no s/s bleeding at this time  - hold warfarin for two days then check INR  - nsg monitor closely for s/s bleeding and if note update provider        Electronically signed by  ANN MARIE Blunt CNP

## 2018-05-23 ENCOUNTER — HOSPITAL LABORATORY (OUTPATIENT)
Dept: OTHER | Facility: CLINIC | Age: 83
End: 2018-05-23

## 2018-05-23 LAB
ANION GAP SERPL CALCULATED.3IONS-SCNC: 5 MMOL/L (ref 3–14)
BUN SERPL-MCNC: 27 MG/DL (ref 7–30)
CALCIUM SERPL-MCNC: 9.2 MG/DL (ref 8.5–10.1)
CHLORIDE SERPL-SCNC: 102 MMOL/L (ref 94–109)
CO2 SERPL-SCNC: 37 MMOL/L (ref 20–32)
CREAT SERPL-MCNC: 1.18 MG/DL (ref 0.52–1.04)
GFR SERPL CREATININE-BSD FRML MDRD: 42 ML/MIN/1.7M2
GLUCOSE SERPL-MCNC: 67 MG/DL (ref 70–99)
POTASSIUM SERPL-SCNC: 4.2 MMOL/L (ref 3.4–5.3)
SODIUM SERPL-SCNC: 144 MMOL/L (ref 133–144)

## 2018-06-12 ENCOUNTER — NURSING HOME VISIT (OUTPATIENT)
Dept: GERIATRICS | Facility: CLINIC | Age: 83
End: 2018-06-12
Payer: COMMERCIAL

## 2018-06-12 VITALS
OXYGEN SATURATION: 97 % | HEART RATE: 78 BPM | WEIGHT: 144.4 LBS | DIASTOLIC BLOOD PRESSURE: 68 MMHG | RESPIRATION RATE: 18 BRPM | HEIGHT: 64 IN | BODY MASS INDEX: 24.65 KG/M2 | TEMPERATURE: 97.5 F | SYSTOLIC BLOOD PRESSURE: 122 MMHG

## 2018-06-12 DIAGNOSIS — R60.0 BILATERAL LEG EDEMA: Primary | ICD-10-CM

## 2018-06-12 DIAGNOSIS — N18.30 BENIGN HYPERTENSION WITH CKD (CHRONIC KIDNEY DISEASE) STAGE III (H): ICD-10-CM

## 2018-06-12 DIAGNOSIS — I12.9 BENIGN HYPERTENSION WITH CKD (CHRONIC KIDNEY DISEASE) STAGE III (H): ICD-10-CM

## 2018-06-12 DIAGNOSIS — Z86.718 PERSONAL HISTORY OF DVT (DEEP VEIN THROMBOSIS): ICD-10-CM

## 2018-06-12 DIAGNOSIS — F03.90 DEMENTIA WITHOUT BEHAVIORAL DISTURBANCE, UNSPECIFIED DEMENTIA TYPE: ICD-10-CM

## 2018-06-12 PROCEDURE — 99309 SBSQ NF CARE MODERATE MDM 30: CPT | Performed by: NURSE PRACTITIONER

## 2018-06-12 NOTE — PROGRESS NOTES
[facility]    Chief Complaint   Patient presents with     prison Regulatory       [facility] Medical Record Number:  [id number]    HPI:    Celeste Hawthorne is a 89 year old  (7/30/1915), who is being seen today for a federally mandated E/M visit at [facility].  HPI information obtained from: facility chart records. Today's concerns are:     Bilateral leg edema  Benign hypertension with CKD (chronic kidney disease) stage III  Dementia without behavioral disturbance, unspecified dementia type  Personal history of DVT (deep vein thrombosis)    Patient found in room sitting up, alert, calm, NAD. Reports mild pain to RLE. Denies SOB, chest pain, fever or chills.     ALLERGIES: Amoxicillin  PAST MEDICAL HISTORY:  has a past medical history of Anemia; Anemia due to blood loss, acute (5/14/2012); Breast cancer (H); Constipation; History of blood transfusion; Hypertension; Left shoulder pain; Macular degeneration; and Malignant neoplasm (H) (1981).  PAST SURGICAL HISTORY:  has a past surgical history that includes Esophagoscopy (5/11/2012); Breast surgery (1981); Open reduction internal fixation femur distal (5/8/2012); Eye surgery; hemorrhoidectomy; Repair esophageal stricture; and Reverse arthroplasty shoulder (Left, 8/19/2014).  FAMILY HISTORY: family history includes Other Cancer in her son.  SOCIAL HISTORY:  reports that she has never smoked. She has never used smokeless tobacco. She reports that she drinks alcohol. She reports that she does not use illicit drugs.    MEDICATIONS:  Current Outpatient Prescriptions   Medication Sig Dispense Refill     ACETAMINOPHEN PO Take 1,000 mg by mouth 3 times daily        ammonium lactate (LAC-HYDRIN) 12 % lotion Apply topically 2 times daily Apply to All extremities topically every day and evening shift for Dry Skin       ARTIFICIAL TEAR OP Place 1 drop into both eyes 4 times daily ; and instill 1 drop in both eyes PRN        "atenolol (TENORMIN) 25 MG tablet Take 25 mg by mouth daily.       Bumetanide (BUMEX PO) Take 1 mg by mouth daily        gabapentin (NEURONTIN) 100 MG capsule Take 300 mg by mouth 2 times daily        HydrALAZINE HCl (APRESOLINE PO) Take 10 mg by mouth 3 times daily        multivitamin (OCUVITE) TABS Take 1 tablet by mouth daily       OXYCODONE HCL PO Take 2.5 mg by mouth 4 times daily and q 8 hours prn       polyethylene glycol (MIRALAX/GLYCOLAX) packet Take 1 packet by mouth daily       VITAMIN D, CHOLECALCIFEROL, PO Take 1,000 Units by mouth daily       Warfarin Therapy Reminder 1 each continuous prn       Medications reviewed:  Medications reconciled to facility chart and changes were made to reflect current medications as identified as above med list. Below are the changes that were made:   Medications stopped since last EPIC medication reconciliation:   Medications Discontinued During This Encounter   Medication Reason     warfarin (COUMADIN) 2 MG tablet Discontinued by another Health Care Provider       Medications started since last [facility] medication reconciliation:  Orders Placed This Encounter   Medications     Warfarin Therapy Reminder     Si each continuous prn         Case Management:  I have reviewed the care plan and MDS and do agree with the plan. Patient's desire to return to the community is not assessable 2/2 above.  Information reviewed:  Medications, vital signs, orders, and nursing notes.    ROS:    Not assessable 2/2 above    Exam:  Vitals: /68  Pulse 78  Temp 97.5  F (36.4  C)  Resp 18  Ht 5' 4\" (1.626 m)  Wt 144 lb 6.4 oz (65.5 kg)  SpO2 97%  BMI 24.79 kg/m2  BMI= Body mass index is 24.79 kg/(m^2).    Resp: Effort WNL- LS decreased throughout  CV: S1-2, no S3-4, no murmurs noted- 1+ LE edema bilaterally  Abd- soft, nontender, BS +  Musc- DAVE  Psych- alert, calm, pleasant       BP -: 109-145/5983 mmHg  Weights:   : 144.4lbs  : 150.0lbs  : " 149.6lbs  06/01: 149.4lbs  05/23:  150.0lbs      Lab/Diagnostic data:     CBC RESULTS:   Recent Labs   Lab Test  12/21/17   0620  11/27/17   0900   12/05/16   0955   WBC  5.0   --    --   12.9*   RBC  3.71*   --    --   3.26*   HGB  11.8  13.3   < >  10.8*   HCT  36.5   --    --   33.2*   MCV  98   --    --   102*   MCH  31.8   --    --   33.1*   MCHC  32.3   --    --   32.5   RDW  14.0   --    --   13.1   PLT  208   --    --   147*    < > = values in this interval not displayed.       Last Basic Metabolic Panel:  Recent Labs   Lab Test  05/23/18   0700  05/17/18   0752   NA  144  144   POTASSIUM  4.2  4.8   CHLORIDE  102  105   HEIDY  9.2  8.9   CO2  37*  36*   BUN  27  28   CR  1.18*  1.10*   GLC  67*  82       ASSESSMENT/PLAN    Bilateral leg edema  - Improved since recent increase in Bumex- -patient often keeps elevated  - continue LE wraps  - follow weights and edema closely     Benign hypertension with CKD (chronic kidney disease) stage III  -BP controlled/ Baseline Cr 1.2- 1.3- currently stable even with recent increase in diuretic  - continues on hydralazine  - avoid nephrotoxic medications  - periodic BMP     Dementia without behavioral disturbance, unspecified dementia type  - moderate impairment  - Progressive disease, continued global decline is anticipated.  - Continue supportive cares and treatments in LTC. Ongoing advanced care planning.        Personal history of DVT (deep vein thrombosis)- right  - continue on warfarin - gaol INR 2-3  - observe for s/s bleeding  - continue on sched acetaminophen, BID gabapentin and prn oxycodone for pain  - follow clinically           Electronically signed by:  ANN MARIE Blunt CNP

## 2018-06-14 ENCOUNTER — HOSPITAL LABORATORY (OUTPATIENT)
Dept: OTHER | Facility: CLINIC | Age: 83
End: 2018-06-14

## 2018-06-14 LAB
ANION GAP SERPL CALCULATED.3IONS-SCNC: 3 MMOL/L (ref 3–14)
BUN SERPL-MCNC: 25 MG/DL (ref 7–30)
CALCIUM SERPL-MCNC: 8.8 MG/DL (ref 8.5–10.1)
CHLORIDE SERPL-SCNC: 100 MMOL/L (ref 94–109)
CO2 SERPL-SCNC: 37 MMOL/L (ref 20–32)
CREAT SERPL-MCNC: 1.24 MG/DL (ref 0.52–1.04)
ERYTHROCYTE [DISTWIDTH] IN BLOOD BY AUTOMATED COUNT: 14.5 % (ref 10–15)
GFR SERPL CREATININE-BSD FRML MDRD: 40 ML/MIN/1.7M2
GLUCOSE SERPL-MCNC: 80 MG/DL (ref 70–99)
HCT VFR BLD AUTO: 33.4 % (ref 35–47)
HGB BLD-MCNC: 10.2 G/DL (ref 11.7–15.7)
MCH RBC QN AUTO: 31 PG (ref 26.5–33)
MCHC RBC AUTO-ENTMCNC: 30.5 G/DL (ref 31.5–36.5)
MCV RBC AUTO: 102 FL (ref 78–100)
PLATELET # BLD AUTO: 174 10E9/L (ref 150–450)
POTASSIUM SERPL-SCNC: 4.3 MMOL/L (ref 3.4–5.3)
RBC # BLD AUTO: 3.29 10E12/L (ref 3.8–5.2)
SODIUM SERPL-SCNC: 140 MMOL/L (ref 133–144)
WBC # BLD AUTO: 4.2 10E9/L (ref 4–11)

## 2018-06-19 ENCOUNTER — NURSING HOME VISIT (OUTPATIENT)
Dept: GERIATRICS | Facility: CLINIC | Age: 83
End: 2018-06-19
Payer: COMMERCIAL

## 2018-06-19 VITALS
TEMPERATURE: 97.5 F | BODY MASS INDEX: 25.47 KG/M2 | DIASTOLIC BLOOD PRESSURE: 86 MMHG | RESPIRATION RATE: 18 BRPM | HEIGHT: 64 IN | OXYGEN SATURATION: 92 % | HEART RATE: 86 BPM | SYSTOLIC BLOOD PRESSURE: 147 MMHG | WEIGHT: 149.2 LBS

## 2018-06-19 DIAGNOSIS — Z79.01 LONG-TERM (CURRENT) USE OF ANTICOAGULANTS: ICD-10-CM

## 2018-06-19 DIAGNOSIS — Z51.81 ENCOUNTER FOR THERAPEUTIC DRUG MONITORING: Primary | ICD-10-CM

## 2018-06-19 DIAGNOSIS — Z86.718 PERSONAL HISTORY OF DVT (DEEP VEIN THROMBOSIS): ICD-10-CM

## 2018-06-19 PROCEDURE — 99308 SBSQ NF CARE LOW MDM 20: CPT | Performed by: NURSE PRACTITIONER

## 2018-06-19 NOTE — PROGRESS NOTES
[facility]    HPI:    Celeste Hawthorne is a 89 year old  (7/30/1915), who is being seen today for an episodic care visit at [facility].   HPI information obtained from: facility chart records. Today's concern is INR/Coumadin management for DVT    Bleeding Signs/Symptoms:  None  Thromboembolic Signs/Symptoms:  None    Medication Changes:  No  Dietary Changes:  No  Activity Changes: No  Bacterial/Viral Infection:  No    Missed Coumadin Doses:  None    On ASA: No    Other Concerns:  No    OBJECTIVE:    INR Today:  1.9  Current Dose:  3 mg daily    ASSESSMENT:    Encounter for therapeutic drug monitoring  Long-term (current) use of anticoagulants  Personal history of DVT (deep vein thrombosis)          PLAN:      No change in dose    Next INR: 2 weeks      ANN MARIE Blunt CNP

## 2018-07-03 ENCOUNTER — NURSING HOME VISIT (OUTPATIENT)
Dept: GERIATRICS | Facility: CLINIC | Age: 83
End: 2018-07-03
Payer: COMMERCIAL

## 2018-07-03 VITALS
OXYGEN SATURATION: 94 % | BODY MASS INDEX: 25.13 KG/M2 | WEIGHT: 147.2 LBS | DIASTOLIC BLOOD PRESSURE: 68 MMHG | SYSTOLIC BLOOD PRESSURE: 116 MMHG | HEART RATE: 82 BPM | TEMPERATURE: 97.4 F | RESPIRATION RATE: 18 BRPM | HEIGHT: 64 IN

## 2018-07-03 DIAGNOSIS — Z86.718 PERSONAL HISTORY OF DVT (DEEP VEIN THROMBOSIS): ICD-10-CM

## 2018-07-03 DIAGNOSIS — Z79.01 LONG-TERM (CURRENT) USE OF ANTICOAGULANTS: ICD-10-CM

## 2018-07-03 DIAGNOSIS — Z51.81 ENCOUNTER FOR THERAPEUTIC DRUG MONITORING: Primary | ICD-10-CM

## 2018-07-03 PROCEDURE — 99308 SBSQ NF CARE LOW MDM 20: CPT | Performed by: NURSE PRACTITIONER

## 2018-07-03 NOTE — PROGRESS NOTES
[place] [facility]    HPI:    Celeste Hawthorne is a 89 year old  (7/30/1915), who is being seen today for an episodic care visit at [facility].   HPI information obtained from: facility chart records. Today's concern is INR/Coumadin management for DVT    Bleeding Signs/Symptoms:  None  Thromboembolic Signs/Symptoms:  None    Medication Changes:  No  Dietary Changes:  No  Activity Changes: No  Bacterial/Viral Infection:  No    Missed Coumadin Doses:  None    On ASA: No    Other Concerns:  No    OBJECTIVE:    INR Today:  1.4  Current Dose:  3 mg daily    ASSESSMENT:    Encounter for therapeutic drug monitoring   Long-term (current) use of anticoagulants  Personal history of DVT (deep vein thrombosis)          PLAN:    New Dose: 5 mg x1 then 3mg daily    Next INR: 1 weeks      ANN MARIE Blunt CNP

## 2018-07-10 NOTE — PROGRESS NOTES
[facility] [facility]    HPI:    Celeste Hawthorne is a 89 year old  (7/30/1915), who is being seen today for an episodic care visit at [facility].   HPI information obtained from: facility chart records. Today's concern is INR/Coumadin management for DVT    Bleeding Signs/Symptoms:  None  Thromboembolic Signs/Symptoms:  None    Medication Changes:  No  Dietary Changes:  No  Activity Changes: No  Bacterial/Viral Infection:  No    Missed Coumadin Doses:  None    On ASA: No    Other Concerns:  No    OBJECTIVE:    INR Today:  1.8  Current Dose:  5 mg x 1 then 3 mg daily    ASSESSMENT:    Encounter for therapeutic drug monitoring   Long-term (current) use of anticoagulants  Personal history of DVT (deep vein thrombosis)      PLAN:    New Dose: 5 mg x1 then 3mg daily    Next INR: 2 weeks      ANN MARIE Blunt CNP

## 2018-08-07 NOTE — PROGRESS NOTES
[facility] [facility]    HPI:    Celeste Hawthorne is a 89 year old  (7/30/1915), who is being seen today for an episodic care visit at [facility].   HPI information obtained from: facility chart records. Today's concern is INR/Coumadin management for DVT    Bleeding Signs/Symptoms:  None  Thromboembolic Signs/Symptoms:  None    Medication Changes:  No  Dietary Changes:  No  Activity Changes: No  Bacterial/Viral Infection:  No    Missed Coumadin Doses:  None    On ASA: No    Other Concerns:  No    OBJECTIVE:    INR Today:  2.4  Current Dose:  5 mg x 2  then 3 mg daily per week    ASSESSMENT:    Encounter for therapeutic drug monitoring   Long-term (current) use of anticoagulants  Personal history of DVT (deep vein thrombosis)      PLAN:    New Dose: 5 mg x 1 and 3 mg po daily per week    Next INR:  One week    ANN MARIE Blunt CNP

## 2018-08-13 NOTE — IP AVS SNAPSHOT
` `     [facility]: [phone]            Medication Administration Report for Celeste Hawthorne as of 08/15/18 5424   Legend:    Given Hold Not Given Due Canceled Entry Other Actions    Time Time (Time) Time  Time-Action       Inactive    Active    Linked        Medications 08/09/18 08/10/18 08/11/18 08/12/18 08/13/18 08/14/18 08/15/18    acetaminophen (TYLENOL) tablet 1,000 mg  Dose: 1,000 mg  Freq: 3 TIMES DAILY Route: PO  Start: 08/14/18 0800   Admin Instructions: Maximum acetaminophen dose from all sources = 75 mg/kg/day not to exceed 4 gram    Admin. Amount: 2 tablet (2 × 500 mg tablet)  Last Admin: 08/15/18 1000  Dispense Loc:  ADS MS6S          1007 (1,000 mg)-Given       1413 (1,000 mg)-Given       2018 (1,000 mg)-Given        1000 (1,000 mg)-Given       [ ] 1400       [ ] 2000           acetaminophen (TYLENOL) tablet 650 mg  Dose: 650 mg  Freq: EVERY 4 HOURS PRN Route: PO  PRN Reason: mild pain  Start: 08/14/18 0228   Admin Instructions: Alternate ibuprofen (if ordered) with acetaminophen.  Maximum acetaminophen dose from all sources = 75 mg/kg/day not to exceed 4 grams/day.    Admin. Amount: 2 tablet (2 × 325 mg tablet)  Dispense Loc:  ADS MS6S               atenolol (TENORMIN) tablet 25 mg  Dose: 25 mg  Freq: DAILY Route: PO  Start: 08/14/18 0800   Admin. Amount: 1 tablet (1 × 25 mg tablet)  Last Admin: 08/15/18 1001  Dispense Loc:  ADS MS6S          1007 (25 mg)-Given        1001 (25 mg)-Given           azithromycin (ZITHROMAX) 250 mg in sodium chloride 0.9 % 250 mL intermittent infusion  Dose: 250 mg  Freq: EVERY 24 HOURS Route: IV  Indications of Use: COMMUNITY ACQUIRED PNEUMONIA  Start: 08/15/18 0300   End: 08/19/18 0259   Admin Instructions: Schedule subsequent doses 24 hours from first dose.   May switch to oral when taking PO and not in ICU.    Admin. Amount: 250 mg  Last Admin: 08/15/18 0306  Dispense Loc:  Main Pharmacy  Infused Over: 1 Hours  Administrations Remaining:  3  Volume: 250 mL   Mixture Administration Information:   Medication Type Amount   azithromycin 500 MG SOLR Medications 250 mg   sodium chloride 0.9 % SOLN Base 250 mL           Current Line: Peripheral IV 08/14/18 Right Hand          0306 (250 mg)-New Bag           bisacodyl (DULCOLAX) EC tablet 5 mg  Dose: 5 mg  Freq: DAILY PRN Route: PO  PRN Reason: constipation  Start: 08/14/18 0228   Admin Instructions: Do not crush or chew. Swallow whole. May titrate 1 tablet each day until response. Maximum of 3 tablets daily. Hold for loose stools. This is the first step of a three step constipation treatment.    Admin. Amount: 1 tablet (1 × 5 mg tablet)  Dispense Loc:  Main Pharmacy              Or  bisacodyl (DULCOLAX) EC tablet 10 mg  Dose: 10 mg  Freq: DAILY PRN Route: PO  PRN Reason: constipation  Start: 08/14/18 0228   Admin Instructions: Do not crush or chew. Swallow whole. May titrate 1 tablet each day until response. Maximum of 3 tablets daily. Hold for loose stools. This is the first step of a three step constipation treatment.    Admin. Amount: 2 tablet (2 × 5 mg tablet)  Dispense Loc:  Main Pharmacy              Or  bisacodyl (DULCOLAX) EC tablet 15 mg  Dose: 15 mg  Freq: DAILY PRN Route: PO  PRN Reason: constipation  Start: 08/14/18 0228   Admin Instructions: Do not crush or chew. Swallow whole. May titrate 1 tablet each day until response. Maximum of 3 tablets daily. Hold for loose stools. This is the first step of a three step constipation treatment.    Admin. Amount: 3 tablet (3 × 5 mg tablet)  Dispense Loc:  Main Pharmacy               bisacodyl (DULCOLAX) Suppository 10 mg  Dose: 10 mg  Freq: DAILY PRN Route: RE  PRN Reason: constipation  Start: 08/14/18 0228   Admin Instructions: Hold for loose stools.  This is the third step of a three step constipation treatment.    Admin. Amount: 1 suppository (1 × 10 mg suppository)  Dispense Loc:  ADS MS6S               bumetanide (BUMEX) tablet 1 mg  Dose: 1  "mg  Freq: DAILY Route: PO  Start: 08/14/18 0800   Admin. Amount: 2 tablet (2 × 0.5 mg tablet)  Last Admin: 08/15/18 1001  Dispense Loc: RH ADS MS6S          1007 (1 mg)-Given        1001 (1 mg)-Given           cefTRIAXone (ROCEPHIN) 2 g vial to attach to  ml bag for ADULTS or NS 50 ml bag for PEDS  Dose: 2 g  Freq: EVERY 24 HOURS Route: IV  Indications of Use: COMMUNITY ACQUIRED PNEUMONIA  Start: 08/14/18 0800   Admin Instructions: FIRST DOSE STAT    Admin. Amount: 2 g  Last Admin: 08/15/18 1002  Dispense Loc: RH ADS MS6S  Infused Over: 30 Minutes  Volume: 20 mL   Current Line: Peripheral IV 08/14/18 Right Hand         0904 (2 g)-New Bag        1002 (2 g)-New Bag           gabapentin (NEURONTIN) capsule 300 mg  Dose: 300 mg  Freq: 2 TIMES DAILY Route: PO  Start: 08/14/18 0800   Admin. Amount: 1 capsule (1 × 300 mg capsule)  Last Admin: 08/15/18 1002  Dispense Loc: RH ADS MS6S          1007 (300 mg)-Given       2019 (300 mg)-Given        1002 (300 mg)-Given       [ ] 2000           hydrALAZINE (APRESOLINE) tablet 10 mg  Dose: 10 mg  Freq: 3 TIMES DAILY Route: PO  Indications of Use: CHRONIC LEFT SYSTOLIC HEART FAILURE,HYPERTENSION  Start: 08/14/18 0800   Admin. Amount: 1 tablet (1 × 10 mg tablet)  Last Admin: 08/15/18 1002  Dispense Loc: RH ADS MS6S          1007 (10 mg)-Given       (1412)-Not Given [C]       2113 (10 mg)-Given        1002 (10 mg)-Given       [ ] 1400       [ ] 2000           lidocaine (LMX4) kit 2.5 g  Dose: 2.5 g  Freq: EVERY 1 HOUR PRN Route: Top  PRN Reason: pain  PRN Comment: with VAD insertion or accessing implanted port.  Start: 08/14/18 0228   Admin Instructions: Do NOT give if patient has a history of allergy to any local anesthetic or any \"stacy\" product.   Apply 30 minutes prior to VAD insertion or port access.  MAX Dose:  2.5 g (  of 5 g tube)    Admin. Amount: 2.5 g  Dispense Loc: RH ADS MS6S               lidocaine 1 % 1 mL  Dose: 1 mL  Freq: EVERY 1 HOUR PRN Route: OTHER  PRN " "Comment: mild pain with VAD insertion or accessing implanted port  Start: 08/14/18 0228   Admin Instructions: Do NOT give if patient has a history of allergy to any local anesthetic or any \"stacy\" product. MAX dose 1 mL subcutaneous OR intradermal in divided doses.    Admin. Amount: 1 mL  Dispense Loc: [facility] Floor Stock  Volume: 2 mL               magnesium hydroxide (MILK OF MAGNESIA) suspension 30 mL  Dose: 30 mL  Freq: DAILY PRN Route: PO  PRN Reason: constipation  Start: 08/14/18 0228   Admin Instructions: Hold for loose stools.  This is the second step of a three step constipation treatment.    Admin. Amount: 30 mL  Dispense Loc: RH ADS MS6S  Volume: 30 mL               melatonin tablet 1 mg  Dose: 1 mg  Freq: AT BEDTIME PRN Route: PO  PRN Reason: sleep  Start: 08/14/18 0228   Admin Instructions: Do not give unless at least 6 hours of uninterrupted sleep is expected.    Admin. Amount: 1 tablet (1 × 1 mg tablet)  Dispense Loc: RH ADS MS6S               naloxone (NARCAN) injection 0.1-0.4 mg  Dose: 0.1-0.4 mg  Freq: EVERY 2 MIN PRN Route: IV  PRN Reason: opioid reversal  Start: 08/14/18 0228   Admin Instructions: For respiratory rate LESS than or EQUAL to 8.  Partial reversal dose:  0.1 mg titrated q 2 minutes for Analgesia Side Effects Monitoring Sedation Level of 3 (frequently drowsy, arousable, drifts to sleep during conversation).Full reversal dose:  0.4 mg bolus for Analgesia Side Effects Monitoring Sedation Level of 4 (somnolent, minimal or no response to stimulation).  For ordered doses up to 2mg give IVP. Give each 0.4mg over 15 seconds in emergency situations. For non-emergent situations further dilute in 9mL of NS to facilitate titration of response.    Admin. Amount: 0.1-0.4 mg = 0.25-1 mL Conc: 0.4 mg/mL  Dispense Loc: RH ADS MS6S  Volume: 1 mL               ondansetron (ZOFRAN-ODT) ODT tab 4 mg  Dose: 4 mg  Freq: EVERY 6 HOURS PRN Route: PO  PRN Reasons: nausea,vomiting  Start: 08/14/18 0228   Admin " Instructions: This is Step 1 of nausea and vomiting management.  If nausea not resolved in 15 minutes, go to Step 2 prochlorperazine (COMPAZINE). Do not push through foil backing. Peel back foil and gently remove. Place on tongue immediately. Administration with liquid unnecessary  With dry hands, peel back foil backing and gently remove tablet; do not push oral disintegrating tablet through foil backing; administer immediately on tongue and oral disintegrating tablet dissolves in seconds; then swallow with saliva; liquid not required.    Admin. Amount: 1 tablet (1 × 4 mg tablet)  Dispense Loc: RH ADS MS6S              Or  ondansetron (ZOFRAN) injection 4 mg  Dose: 4 mg  Freq: EVERY 6 HOURS PRN Route: IV  PRN Reasons: nausea,vomiting  Start: 08/14/18 0228   Admin Instructions: This is Step 1 of nausea and vomiting management.  If nausea not resolved in 15 minutes, go to Step 2 prochlorperazine (COMPAZINE).  Irritant. For ordered doses up to 4 mg, give IV Push undiluted over 2-5 minutes.    Admin. Amount: 4 mg = 2 mL Conc: 4 mg/2 mL  Dispense Loc: [facility] ADS MS6S  Infused Over: 2-5 Minutes  Volume: 2 mL               oxyCODONE IR (ROXICODONE) half-tab 2.5 mg  Dose: 2.5 mg  Freq: 4 TIMES DAILY Route: PO  Indications of Use: CHRONIC PAIN  Start: 08/14/18 0800   Admin. Amount: 1 half-tab (1 × 2.5 mg half-tab)  Last Admin: 08/15/18 1001  Dispense Loc: RH ADS MS6S          1007 (2.5 mg)-Given       (1203)-Not Given       1657 (2.5 mg)-Given [C]       (2026)-Not Given        1001 (2.5 mg)-Given       [ ] 1400       [ ] 1800       [ ] 2200           Patient is already receiving anticoagulation with heparin, enoxaparin (LOVENOX), warfarin (COUMADIN)  or other anticoagulant medication  Freq: CONTINUOUS PRN Route: XX  Start: 08/14/18 0228   Dispense Loc: Non Rx dispense               polyethylene glycol (MIRALAX/GLYCOLAX) Packet 17 g  Dose: 17 g  Freq: DAILY Route: PO  Start: 08/14/18 0800   Admin Instructions: 1 Packet = 17  grams. Mixed prescribed dose in 8 ounces of water. Follow with 8 oz. of water.    Admin. Amount: 17 g  Last Admin: 08/15/18 1002  Dispense Loc:  ADS MS6S          (1018)-Not Given        1002 (17 g)-Given           sodium chloride (PF) 0.9% PF flush 3 mL  Dose: 3 mL  Freq: EVERY 8 HOURS Route: IK  Start: 18   Admin Instructions: And Q1H PRN, to lock peripheral IV dormant line.    Admin. Amount: 3 mL  Last Admin: 08/15/18 1002  Dispense Loc: [facility] Floor Stock  Volume: 4 mL   Current Line: Peripheral IV 18 Right Hand         (0309)-Not Given       1018 (3 mL)-Given       1901 (3 mL)-Given        0306 (3 mL)-Given       1002 (3 mL)-Given       [ ] 1830           sodium chloride (PF) 0.9% PF flush 3 mL  Dose: 3 mL  Freq: EVERY 1 HOUR PRN Route: IK  PRN Reason: line flush  PRN Comment: for peripheral IV flush post IV meds  Start: 18   Admin. Amount: 3 mL  Dispense Loc: [facility] Floor Stock  Volume: 4 mL               Warfarin Therapy Reminder (Check START DATE - warfarin may be starting in the FUTURE)  Dose: 1 each  Freq: CONTINUOUS PRN Route: XX  Start: 18   Admin Instructions: *Note to reorder warfarin daily*  Pharmacy Warfarin Dosing Service  Patient is on Warfarin Therapy - check for daily order    Admin. Amount: 1 each  Dispense Loc:  Main Pharmacy              Completed Medications  Medications 08/09/18 08/10/18 08/11/18 08/12/18 08/13/18 08/14/18 08/15/18         Dose: 1,000 mL  Freq: ONCE Route: IV  Last Dose: 1,000 mL (18)  Start: 18   End: 18   Admin. Amount: 1,000 mL  Last Admin: 18  Dispense Loc:  [facility] ER  Infused Over: 1 Hours  Administrations Remainin  Volume: 1,000 mL         2330 (1,000 mL)-New Bag               Dose: 500 mg  Freq: EVERY 24 HOURS Route: IV  Indications of Use: COMMUNITY ACQUIRED PNEUMONIA  Start: 18   End: 18 0418   Admin Instructions: FIRST DOSE STAT.    Admin. Amount: 500 mg  Last Admin:  "18  Dispense Loc:  Main Pharmacy  Infused Over: 1 Hours  Administrations Remainin  Volume: 250 mL   Mixture Administration Information:   Medication Type Amount   azithromycin 500 MG SOLR Medications 500 mg   sodium chloride 0.9 % SOLN Base 250 mL           Current Line: Peripheral IV 18 Right Hand         0318 (500 mg)-New Bag              Dose: 1 g  Freq: ONCE Route: IV  Indications of Use: HEALTHCARE-ASSOCIATED PNEUMONIA  Last Dose: Stopped (18)  Start: 18   End: 18   Admin. Amount: 1 g  Last Admin: 18  Dispense Loc:  Main Pharmacy  Administrations Remainin          0108 (1 g)-New Bag       139-Stopped              Dose: 1,250 mg  Freq: ONCE Route: IV  Indications of Use: SEPSIS  Indications Comment: possible sirs  Last Dose: Stopped (18)  Start: 18   End: 18   Admin Instructions: Vesicant.    Admin. Amount: 1,250 mg  Last Admin: 18  Dispense Loc:  Main Pharmacy  Infused Over: 90 Minutes  Administrations Remainin  Volume: 250 mL   Mixture Administration Information:   Medication Type Amount   vancomycin 100 mg/mL SOLR Medications 1,250 mg   sodium chloride 0.9 % SOLN Base 250 mL                  139 (1,250 mg)-New Bag       229-ED Infusing on Admission/transfer              Dose: 3 mg  Freq: ONCE AT 6PM Route: PO  Start: 18 1800   End: 18   Admin. Amount: 1 tablet (1 × 3 mg tablet)  Last Admin: 18  Dispense Loc:  ADS MS6S  Administrations Remainin          0 (3 mg)-Given           Discontinued Medications  Medications 08/09/18 08/10/18 08/11/18 08/12/18 08/13/18 08/14/18 08/15/18         Freq: EVERY 1 HOUR PRN Route: Top  PRN Reason: pain  PRN Comment: with VAD insertion or accessing implanted port.  Start: 18   End: 18   Admin Instructions: Do NOT give if patient has a history of allergy to any local anesthetic or any \"stacy\" " "product.   Apply 30 minutes prior to VAD insertion or port access.  MAX Dose:  2.5 g (  of 5 g tube)    Dispense Loc: [facility] ADS MS6S          0228-Med Discontinued          Dose: 1 mL  Freq: EVERY 1 HOUR PRN Route: OTHER  PRN Comment: mild pain with VAD insertion or accessing implanted port  Start: 08/13/18 2311   End: 08/14/18 0228   Admin Instructions: Do NOT give if patient has a history of allergy to any local anesthetic or any \"stacy\" product. MAX dose 1 mL subcutaneous OR intradermal in divided doses.    Admin. Amount: 1 mL  Dispense Loc: [facility] Floor Stock  Volume: 2 mL          0228-Med Discontinued          Dose: 3 mL  Freq: EVERY 8 HOURS Route: IK  Start: 08/13/18 2313   End: 08/14/18 0228   Admin Instructions: And Q1H PRN, to lock peripheral IV dormant line.    Admin. Amount: 3 mL  Dispense Loc: [facility] Floor Stock  Volume: 4 mL          0228-Med Discontinued                Dose: 3 mL  Freq: EVERY 1 HOUR PRN Route: IK  PRN Reason: line flush  PRN Comment: for peripheral IV flush post IV meds  Start: 08/13/18 2311   End: 08/14/18 0228   Admin. Amount: 3 mL  Dispense Loc: [facility] Floor Stock  Volume: 4 mL          0228-Med Discontinued          Rate: 125 mL/hr Dose: 1000 mL  Freq: CONTINUOUS Route: IV  Start: 08/13/18 2313   End: 08/14/18 0228   Admin Instructions: Administer after the bolus.    Admin. Amount: 1,000 mL  Dispense Loc: [facility] Floor Stock  Volume: 1,000 mL          0228-Med Discontinued           Medications 08/09/18 08/10/18 08/11/18 08/12/18 08/13/18 08/14/18 08/15/18        "

## 2018-08-13 NOTE — IP AVS SNAPSHOT
[facility] [place] [facility]    [address]    [facility] [address]    Phone:  [phone]    Fax:  [phone]                                       After Visit Summary   8/13/2018    Celeste Hawthorne    MRN: [id number]           After Visit Summary Signature Page     I have received my discharge instructions, and my questions have been answered. I have discussed any challenges I see with this plan with the nurse or doctor.    ..........................................................................................................................................  Patient/Patient Representative Signature      ..........................................................................................................................................  Patient Representative Print Name and Relationship to Patient    ..................................................               ................................................  Date                                            Time    ..........................................................................................................................................  Reviewed by Signature/Title    ...................................................              ..............................................  Date                                                            Time

## 2018-08-13 NOTE — IP AVS SNAPSHOT
` ` Patient Information     Patient Name Sex     Celeste Hawthorne ([id number]) Female 1915       Room Bed    [facility] [address]      Patient Demographics     Address Phone    [facility]  [address] [facility] [address] [phone] (Home)  [phone] (Mobile)      Patient Ethnicity & Race     Ethnic Group Patient Race     White      Emergency Contact(s)     Name Relation Home Work Mobile    Candy Avina Daughter [phone]    Claudine Banda Daughter   [phone]    Donya Hawthorne Daughter   [phone]      Documents on File        Status Date Received Description       Documents for the Patient    Privacy Notice - Elfrida Received 12     External Medication Information Consent       Patient ID Received 14 [place]    Consent for Services - Hospital/Clinic Received () 11     Face Sheet Received () 11     Consent for Services - Hospital/Clinic Received () 12     Consent for EHR Access Received 13 Copied from existing Consent for services - IP/ED collected on 2012    [facility] Specified Other       Consent for Services - Hospital/Clinic Received () 14     Insurance Card Received () 14     Consent for Services - Hospital/Clinic Received () 14     Advance Directives and Living Will Received 14 RESUSCITATION GUIDELINES 2014     Consent for Services - Geriatrics Received 14     Insurance Card Received 03/31/15 PRIME SOLUTION FFS    Consent for Services/Privacy Notice - Hospital/Clinic       Patient ID Received 16     Insurance Card Received 16     Physical Therapy Certification Received 10/03/16 10/1/16-10/1/16   eval only    Advance Directives and Living Will Received 10/06/16 POLST 10/05/2016     Consent for Services/Privacy Notice - Hospital/Clinic  () 10/14/16 CONSENT FOR SERVICE    Care Everywhere Prospective Auth        Advance Directives and Living Will Received 10/19/17 POLST 10/18/2017    HIM BETTINA Authorization  () 17 Authorization for christina SOLER 2017    Advance Directives and Living Will Received 18 Health Care Directive 05    Advance Directives and Living Will Not Received  Validation of AD 05    Consent for Services - Hospital and Clinic Received 18     HIE Auth Received 18     Insurance Card Received (Deleted) 14        Documents for the Encounter    CMS IM for Patient Signature Received 18       Admission Information     Attending Provider Admitting Provider Admission Type Admission Date/Time    Julia Fonseca MD Dorn, Karen T, MD Emergency 18    Discharge Date Hospital Service Auth/Cert Status Service Area     General Medicine [facility] SERVICES    Unit Room/Bed Admission Status       [facility]  Admission (Confirmed)       Admission     Complaint    Pneumonia      Hospital Account     Name Acct ID Class Status Primary Coverage    Celeste Hawthorne [id number] Inpatient Open [facility]            Guarantor Account (for Hospital Account #[id number])     Name Relation to Pt Service Area Active? Acct Type    Celeste Hawthorne Self FCS Yes Personal/Family    Address Phone          [facility]  [address] [place] [address] [phone](H)              Coverage Information (for Hospital Account #[id number])     F/O Payor/Plan Precert #    [facility] [employer]     Subscriber Subscriber #    Celeste Hawthorne [id number]    Address Phone    [address]  [place] [id number] [phone]

## 2018-08-13 NOTE — IP AVS SNAPSHOT
"          [facility] [place] [facility]: [phone]                                              INTERAGENCY TRANSFER FORM - PHYSICIAN ORDERS   2018                    Hospital Admission Date: 2018  ABDOUL PICKETT   : 1915  Sex: Female        Attending Provider: Julia Fonseca MD     Allergies:  Amoxicillin    Infection:  None   Service:  GENERAL MEDI    Ht:  1.626 m (5' 4\")   Wt:  67.6 kg (149 lb 1.6 oz)   Admission Wt:  66.7 kg (147 lb)    BMI:  25.59 kg/m 2   BSA:  1.75 m 2            Patient PCP Information     Provider PCP Type    Puja Casanova, ANN MARIE CNP General      ED Clinical Impression     Diagnosis Description Comment Added By Time Added    Closed head injury, initial encounter [S09.90XA] Closed head injury, initial encounter [S09.90XA]  Marleen Kim MD 2018 11:30 PM    Abrasion of left hand, initial encounter [S60.512A] Abrasion of left hand, initial encounter [S60.512A]  Marleen Kim MD 2018 11:30 PM    Contusion of shoulder, unspecified laterality, initial encounter [S40.019A] Contusion of shoulder, unspecified laterality, initial encounter [S40.019A]  Marleen Kim MD 2018 11:31 PM    Pneumonia of right lung due to infectious organism, unspecified part of lung [J18.9] Pneumonia of right lung due to infectious organism, unspecified part of lung [J18.9]  Jeremías Jeffers MD 2018  1:00 AM      Hospital Problems as of 8/15/2018              Priority Class Noted POA    Pneumonia Medium  2018 Yes      Non-Hospital Problems as of 8/15/2018              Priority Class Noted    Fall: at home Medium  2012    Physical deconditioning Medium  2012    Dysphagia due to Esophageal stricture/dysmotility Issues Medium  2012    Bilateral leg edema Medium  2012    Secondary localized osteoarthrosis, shoulder region Medium  2014    Left rotator cuff tear arthropathy: 14 Medium  2014    Pain in shoulder Medium  2014    " Constipation Medium  8/25/2014    Gait instability, recent fall in her home High  10/5/2016    Benign hypertension with CKD (chronic kidney disease) stage III High  10/5/2016    Personal history of malignant neoplasm of breast, ~ 80 yrs old Medium  10/5/2016    Routine general medical examination at a health care facility,done 10- Medium  10/5/2016    Hip pain, right>> since she had a fall High  11/2/2016    Frailty High  11/23/2016    Adjustment insomnia Medium  11/23/2016    Hx of pleural effusion, Dec 2016 High  2/13/2017    Dementia without behavioral disturbance, unspecified dementia type Medium  4/12/2017    Personal history of DVT (deep vein thrombosis) Medium  6/12/2017    Hx of fracture of femur Low  6/12/2017    CKD (chronic kidney disease) stage 3, GFR 30-59 ml/min Medium  8/16/2017    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 Medium  10/18/2017    Trigger ring finger of right hand High  11/29/2017    Weight gain Medium  11/29/2017    Encounter for monitoring coumadin therapy Medium  1/3/2018    DVT, lower extremity, proximal, acute, right (H) Medium  1/7/2018    Pain of right lower extremity Medium  1/7/2018      Code Status History     Date Active Date Inactive Code Status Order ID Comments User Context    8/15/2018 11:14 AM  DNR/DNI [id number]  Harshad Kitchen MD Outpatient    8/14/2018  2:28 AM 8/15/2018 11:14 AM DNR/DNI [id number]  Julia Fonseca MD Inpatient    10/3/2016  8:56 AM 8/14/2018  2:28 AM DNR/DNI [id number]  Madison Aquino PA-C Outpatient    9/30/2016  7:24 PM 10/3/2016  8:56 AM DNR/DNI [id number]  Bentley Pennington MD ED    3/31/2015 10:57 PM 4/1/2015  2:07 PM Full Code [id number]  Magaly Clark PA-C ED    8/19/2014  7:51 PM 8/22/2014  3:49 PM Full Code [id number]  Albaro Hazel MD Inpatient    5/8/2012  4:46 PM 5/12/2012  1:31 PM Full Code [id number]  Gambucci, Gerladine, RN Inpatient    5/6/2012 10:22 PM 5/8/2012  4:46 PM Full Code [id number]  Tian,  Shivani RAMOS RN Inpatient         Medication Review      START taking        Dose / Directions Comments    azithromycin 250 MG tablet   Commonly known as:  ZITHROMAX   Used for:  Pneumonia of right lung due to infectious organism, unspecified part of lung        Dose:  250 mg   Start taking on:  8/16/2018   Take 1 tablet (250 mg) by mouth daily for 3 days   Quantity:  3 tablet   Refills:  0        cefuroxime 500 MG tablet   Commonly known as:  CEFTIN   Used for:  Pneumonia of right lung due to infectious organism, unspecified part of lung        Dose:  500 mg   Start taking on:  8/16/2018   Take 1 tablet (500 mg) by mouth 2 times daily for 5 days   Quantity:  10 tablet   Refills:  0        ferrous sulfate 325 (65 Fe) MG tablet   Commonly known as:  IRON   Used for:  Anemia, unspecified type        Dose:  325 mg   Take 1 tablet (325 mg) by mouth daily (with breakfast)   Quantity:  90 tablet   Refills:  2        pantoprazole 40 MG EC tablet   Commonly known as:  PROTONIX   Used for:  Anemia, unspecified type        Dose:  40 mg   Take 1 tablet (40 mg) by mouth daily Take 30-60 minutes before a meal.   Quantity:  30 tablet   Refills:  1          CONTINUE these medications which may have CHANGED, or have new prescriptions. If we are uncertain of the size of tablets/capsules you have at home, strength may be listed as something that might have changed.        Dose / Directions Comments    oxyCODONE IR 5 MG tablet   Commonly known as:  ROXICODONE   Indication:  Chronic Pain   This may have changed:    - medication strength  - Another medication with the same name was removed. Continue taking this medication, and follow the directions you see here.   Used for:  Other chronic pain        Dose:  2.5 mg   Take 0.5 tablets (2.5 mg) by mouth 4 times daily and q 8 hours prn   Quantity:  3 tablet   Refills:  0          CONTINUE these medications which have NOT CHANGED        Dose / Directions Comments    ACETAMINOPHEN PO        Dose:   1000 mg   Take 1,000 mg by mouth 3 times daily   Refills:  0        APRESOLINE PO   Indication:  Chronic Left Systolic Heart Failure, High Blood Pressure Disorder        Dose:  10 mg   Take 10 mg by mouth 3 times daily   Refills:  0        atenolol 25 MG tablet   Commonly known as:  TENORMIN        Dose:  25 mg   Take 25 mg by mouth daily.   Refills:  0        BUMEX PO        Dose:  1 mg   Take 1 mg by mouth daily   Refills:  0        Carboxymethylcellulose Sod PF 0.5 % Soln ophthalmic solution   Commonly known as:  REFRESH PLUS        Dose:  1 drop   Place 1 drop into both eyes 4 times daily as needed for dry eyes   Refills:  0        gabapentin 100 MG capsule   Commonly known as:  NEURONTIN        Dose:  300 mg   Take 300 mg by mouth 2 times daily   Refills:  0        multivitamin Tabs tablet        Dose:  1 tablet   Take 1 tablet by mouth daily   Refills:  0        polyethylene glycol Packet   Commonly known as:  MIRALAX/GLYCOLAX        Dose:  1 packet   Take 1 packet by mouth daily   Refills:  0        VITAMIN D (CHOLECALCIFEROL) PO        Dose:  1000 Units   Take 1,000 Units by mouth daily   Refills:  0        * Warfarin Therapy Reminder        Dose:  1 each   1 each continuous prn   Refills:  0        * WARFARIN SODIUM PO        Dose:  3 mg   Take 3 mg by mouth Sun, Mon, Wed, Thu, Fri, Sat   Refills:  0        * WARFARIN SODIUM PO        Dose:  5 mg   Take 5 mg by mouth Tuesdays   Refills:  0        * Notice:  This list has 3 medication(s) that are the same as other medications prescribed for you. Read the directions carefully, and ask your doctor or other care provider to review them with you.            After Care     Activity - Up with assistive device           Activity - Up with nursing assistance           Advance Diet as Tolerated       Follow this diet upon discharge: Orders Placed This Encounter      Combination Diet Mechanical/Dental Soft Diet;       Fall precautions           General info for SNF        Length of Stay Estimate: Long Term Care  Condition at Discharge: Stable  Level of care:skilled   Rehabilitation Potential: Fair  Admission H&P remains valid and up-to-date: Yes  Recent Chemotherapy: N/A  Use Nursing Home Standing Orders: Yes       Mantoux instructions       Give two-step Mantoux (PPD) Per Facility Policy Yes             Procedures     Oxygen - Nasal cannula       2 Lpm by nasal cannula to keep O2 sats 92% or greater.             Follow-Up Appointment Instructions     Future Labs/Procedures    Follow Up and recommended labs and tests     Comments:    Follow up with care home physician.  The following labs/tests are recommended: INR check in 2 days. CBC, vitamin B12 and Folic acid level in 1 week.      Follow-Up Appointment Instructions     Follow Up and recommended labs and tests       Follow up with care home physician.  The following labs/tests are recommended: INR check in 2 days. CBC, vitamin B12 and Folic acid level in 1 week.             Statement of Approval     Ordered          08/15/18 1139  I have reviewed and agree with all the recommendations and orders detailed in this document.  EFFECTIVE NOW     Approved and electronically signed by:  Harshad Kitchen MD

## 2018-08-14 PROBLEM — J18.9 PNEUMONIA: Status: ACTIVE | Noted: 2018-01-01

## 2018-08-14 NOTE — PLAN OF CARE
Problem: Patient Care Overview  Goal: Plan of Care/Patient Progress Review  Discharge Planner PT   Patient plan for discharge: return to LTC per chart  Current status: PT orders received, chart reviewed. Pt admitted with a fall and PNA. Per OT, she had discussed with pt's daughter in the pt room to verify PLOF; at baseline pt receives total assist with all self cares, uses a wheelchair for all mobility, and stand-pivot transfers with assist. No skilled OT/PT needs; will complete the IP PT order and sign off  Barriers to return to prior living situation: no barriers to return to LTC with assist for all mobility  Recommendations for discharge: return to LTC  Rationale for recommendations:  At baseline, pt receives total assist for all cares and mobility; no skilled OT/PT needs. Will sign off.        Entered by: Renée Stack 08/14/2018 4:11 PM

## 2018-08-14 NOTE — PHARMACY-ADMISSION MEDICATION HISTORY
Admission medication history interview status for this patient is complete. See [facility] admission navigator for allergy information, prior to admission medications and immunization status.     Medication history interview source(s):Oumar MARSHALL  Medication history resources (including written lists, pill bottles, clinic record): uOmar MARSHALL    Changes made to PTA medication list:  Added: oxycodone prn  Deleted: lac hydrin  Changed: warfarin    Actions taken by pharmacist (provider contacted, etc):None     Additional medication history information:None    Medication reconciliation/reorder completed by provider prior to medication history? No    Do you take OTC medications (eg tylenol, ibuprofen, fish oil, eye/ear drops, etc)? Y(Y/N)    For patients on insulin therapy: N (Y/N)  Lantus/levemir/NPH/Mix 70/30 dose:   (Y/N) (see Med list for doses)   Sliding scale Novolog Y/N  If Yes, do you have a baseline novolog pre-meal dose:  units with meals  Patients eat three meals a day:   Y/N    How many episodes of hypoglycemia do you have per week: _______  How many missed doses do you have per week: ______  How many times do you check your blood glucose per day: _______   Any Barriers to therapy - Be specific :  cost of medications, comfortable with giving injections (if applicable), comfortable and confident with current diabetes regimen: Y/N ______________      Prior to Admission medications    Medication Sig Last Dose Taking? Auth Provider   ACETAMINOPHEN PO Take 1,000 mg by mouth 3 times daily  8/13/2018 at Unknown time Yes Reported, Patient   atenolol (TENORMIN) 25 MG tablet Take 25 mg by mouth daily. 8/13/2018 at Unknown time Yes Reported, Patient   Bumetanide (BUMEX PO) Take 1 mg by mouth daily  8/13/2018 at Unknown time Yes Reported, Patient   Carboxymethylcellulose Sod PF (REFRESH PLUS) 0.5 % SOLN ophthalmic solution Place 1 drop into both eyes 4 times daily as needed for dry eyes  Yes Reported, Patient   gabapentin  (NEURONTIN) 100 MG capsule Take 300 mg by mouth 2 times daily  8/13/2018 at Unknown time Yes Unknown, Entered By History   HydrALAZINE HCl (APRESOLINE PO) Take 10 mg by mouth 3 times daily  8/13/2018 at Unknown time Yes Reported, Patient   multivitamin (OCUVITE) TABS Take 1 tablet by mouth daily 8/13/2018 at Unknown time Yes Reported, Patient   OXYCODONE HCL PO Take 2.5 mg by mouth every 8 hours as needed  Yes Unknown, Entered By History   OXYCODONE HCL PO Take 2.5 mg by mouth 4 times daily and q 8 hours prn 8/13/2018 at Unknown time Yes Reported, Patient   polyethylene glycol (MIRALAX/GLYCOLAX) packet Take 1 packet by mouth daily 8/13/2018 at Unknown time Yes Unknown, Entered By History   VITAMIN D, CHOLECALCIFEROL, PO Take 1,000 Units by mouth daily 8/13/2018 at Unknown time Yes Reported, Patient   WARFARIN SODIUM PO Take 3 mg by mouth Sun, Mon, Wed, Thu, Fri, Sat 8/13/2018 at Unknown time Yes Unknown, Entered By History   WARFARIN SODIUM PO Take 5 mg by mouth Tuesdays 8/7/2018 Yes Unknown, Entered By History   Warfarin Therapy Reminder 1 each continuous prn  Yes Reported, Patient

## 2018-08-14 NOTE — PROGRESS NOTES
"   08/14/18 0905   General Information   Onset Date 08/14/18   Start of Care Date 08/14/18   Referring Physician Julia Fonseca MD   Patient Profile Review/OT: Additional Occupational Profile Info See Profile for full history and prior level of function   Patient/Family Goals Statement did not state   Swallowing Evaluation Bedside swallow evaluation   Behaviorial Observations Alert;Confused;Distractible;Impulsive;Uncooperative   Mode of current nutrition NPO   Respiratory Status O2 Supply   Type of O2 supply (oxymask)   Comments Per MD, \"This patient is a 89 year old female with PMH of CKD with baseline creat in 1.5-1.7 range, htn, chronic hypoxic respiratory failure on 2 L/NC continuously, hx of DVT on chronic warfarin therapy, chronic pain on qid low dose oxycodone who presents from [facility] facility after falling out of bed. Infiltrates by CXR:  pna vs pulmonary edema.\" History of SLP evaluation in 2012 that recommended GI consult. Upper GI found esophageal stricture that was dilated and hiatal hernia.   Clinical Swallow Evaluation   Oral Musculature unable to assess due to poor participation/comprehension   Oral Musculature Comments Pt refused and did not provide information on history   Additional Documentation Yes   Clinical Swallow Eval: Thin Liquid Texture Trial   Mode of Presentation, Thin Liquids cup;self-fed   Oral Phase of Swallow WFL   Pharyngeal Phase of Swallow intact   Diagnostic Statement no overt s/sx of aspiration   Clinical Swallow Eval: Solid Food Texture Trial   Mode of Presentation, Solid self-fed   Oral Phase, Solid Poor AP movement;Residue in oral cavity   Oral Residue, Solid mid posterior tongue   Pharyngeal Phase, Solid intact   Diagnostic Statement no overt s/sx of aspiration; oral residue   Esophageal Phase of Swallow   Patient reports or presents with symptoms of esophageal dysphagia No  (history of stricture/dilation and hiatal hernia in 2012)   General Therapy " Interventions   Planned Therapy Interventions Dysphagia Treatment   Dysphagia treatment Modified diet education;Instruction of safe swallow strategies   Swallow Eval: Clinical Impressions   Skilled Criteria for Therapy Intervention Skilled criteria met.  Treatment indicated.   Functional Assessment Scale (FAS) 5   Treatment Diagnosis Mild oropharyngeal dysphagia   Diet texture recommendations Thin liquids  (mech/dental soft)   Recommended Feeding/Eating Techniques alternate between small bites and sips of food/liquid;check mouth frequently for oral residue/pocketing;hard swallow w/ each bite or sip;maintain upright posture during/after eating for 30 mins;small sips/bites   Therapy Frequency 3 times/wk   Predicted Duration of Therapy Intervention (days/wks) 1 week   Anticipated Discharge Disposition extended care facility   Risks and Benefits of Treatment have been explained. Yes   Patient, family and/or staff in agreement with Plan of Care Yes   Clinical Impression Comments SLP: Pt presents with mild oropharyngeal dysphagia on clinical swallow evaluation characterized by prolonged oral phase with regular solids. Evaluation limited as pt denied oral motor exam and required encouragement for small amount of PO trials. Adequate oral phase and no overt s/sx of aspiration. No overt s/sx of aspiration with regular solids with mild oral residue following. History of esophageal stricture and dilation. Pt did not present with any s/sx of esophageal dysfunction at this time. Recommend: mechanical/dental soft diet, thin liquids; pt upright for all intake with small bites and sips, slow rate, alternating solids and liquids and check oral cavity after meals.    Total Evaluation Time   Total Evaluation Time (Minutes) 15

## 2018-08-14 NOTE — PROGRESS NOTES
Lab called to notify of critical lab of procalcitonin: 12.35. MD notified. Will await further orders.

## 2018-08-14 NOTE — H&P
History and Physical     Celeste Hawthorne MRN# [id number]   YOB: 1915 Age: 89 year old      Date of Admission:  8/13/2018    Primary care provider: Puja Casanova          Assessment and Plan:   This patient is a 89 year old female with PMH of CKD with baseline creat in 1.5-1.7 range, htn, chronic hypoxic respiratory failure on 2 L/NC continuously, hx of DVT on chronic warfarin therapy, chronic pain on qid low dose oxycodone who presents from [facility] facility after falling out of bed.     1.  Fall out of bed_exact details are unclear, but sounds like she was trying to get up to go to the bathroom and didn't use her walker and fell.  She did hit her head as well. NCHCT in ER without e/o acute hemorrhage (patient on warfarin), and cervical spine without acute fracture    2. Infiltrates by CXR:  pna vs pulmonary edema:  Has e/o of some infiltrates on CXR per rads report but per my review-really just a terrible film with hypoinflation and suspected pleural effusion. LGF of 100,3 on presentation.  No recent URI symptoms.  Does report coughing when eating but sounds as if she eats a regular diet but staff will occasionally cut up her food for her.  She was given cefepime and vanco in the ER.  BCx x 2 obtained. Will switch to ceftriaxone/azith starting tomorrow.  Check procal, if low would consider stopping abx after 2-3 days.  Also seems at risk for aspiration so will ask for SLP involvment. Check BMP and if substantially elevated would trial IV furosemide although seems at her baseline breathing.    3.  Hx of chronic RLE DVT:  On chronic warfarin-will have pharmacy manage although given age one could make the argument that with risk of falls may not be a candidate for further AC-defer to primary    4.  Htn/le edema:  pta atenolol 25 mg, hydralazine 10 mg tid and bumetanide 1 mg every day-resumed    5.  CKD:  Improved from baseline 1.5-1.7 range.     6.  Harsh MIHIR-suspect aortic  sclerosis vs stenosis-unable to locate echo report in chart.     7.  Chronic pain:  Cont with home regimen of gabapentin 300 mg bid and oxycodone 2.5 mg qid and q 8hours prn    Functional status: Walks with a walker, lives in LTC  PPX:  On warfarin  Code:  DNR/I  Dispo: admit IP for > than expected 2 [place] stay               Chief Complaint:   Fell out of bed       History of Present Illness:   This patient is a 89 year old female with PMH of CKD with baseline creat in 1.5-1.7 range, htn, chronic hypoxic respiratory failure on 2 L/NC continuously, hx of DVT on chronic warfarin therapy, chronic pain on qid low dose oxycodone who presents from [facility] facility after falling out of bed.     The exact details surrounding her falling out of bed are unclear, but it is thought that she probably became impulsive needing to use the bathroom so tried to get up without her walker.  EMS  Was called as she was complaining of head and neck pain in addition to pain all over.     She denies any recent infectious type symptoms:  No NC/EA/ST, No N/V, no diarrhea or abdominal pain. No new skin rashes.  Has chronic cough that tends to happen most frequently with eating.  She denies sob.  No f/c/s.  No dysuria but has chronic frequency.     In the ER she is noted to have .3, leukocytosis, and CXR with ? Infiltrates and pleural effusion.  She's been empirically placed on cefepime and vanco       The history is obtained in discussion with the ER provider Dr Constantino, and the patient who is pleasant but memory is an issue as is hearing status.  Also obtained via review of NH chart, EMS report, extensive review of care everywhere and our EMR.          Past Medical History:     Past Medical History:   Diagnosis Date     Anemia      Anemia due to blood loss, acute 5/14/2012     Breast cancer (H) 1981    right breast     Chronic respiratory failure with hypoxia (H)     On O2 2L/nc     Constipation      DVT (deep vein thrombosis)  in pregnancy (H)      History of blood transfusion      Hypertension      Left shoulder pain      Macular degeneration              Past Surgical History:     Past Surgical History:   Procedure Laterality Date     BREAST SURGERY  1981    right mastectomy     ESOPHAGOSCOPY  5/11/2012    Procedure:ESOPHAGOSCOPY; ESOPHAGOSCOPY; Surgeon:ALBARO GARCIA; Location:[facility]     EYE SURGERY      cataract removal     HEMORRHOIDECTOMY       OPEN REDUCTION INTERNAL FIXATION FEMUR DISTAL  5/8/2012    Procedure:OPEN REDUCTION INTERNAL FIXATION FEMUR DISTAL; Open Reduction Internal Fixation Right Femur ; Surgeon:ALBARO HAZEL; Location:[facility] OR     REPAIR ESOPHAGEAL STRICTURE      dilation     REVERSE ARTHROPLASTY SHOULDER Left 8/19/2014    Procedure: REVERSE ARTHROPLASTY SHOULDER;  Surgeon: Albaro Hazel MD;  Location: [facility] OR             Social History:     Social History   Substance Use Topics     Smoking status: Never Smoker     Smokeless tobacco: Never Used     Alcohol use Yes      Comment: one drink a month      Lives at [facility] [place], walks with a walker       Family History:     Family History   Problem Relation Age of Onset     Other Cancer Son             Allergies:     Allergies   Allergen Reactions     Amoxicillin              Medications:     Prior to Admission medications    Medication Sig Last Dose Taking? Auth Provider   ACETAMINOPHEN PO Take 1,000 mg by mouth 3 times daily    Reported, Patient   ammonium lactate (LAC-HYDRIN) 12 % lotion Apply topically 2 times daily Apply to All extremities topically every day and evening shift for Dry Skin   Reported, Patient   atenolol (TENORMIN) 25 MG tablet Take 25 mg by mouth daily.   Reported, Patient   Bumetanide (BUMEX PO) Take 1 mg by mouth daily    Reported, Patient   Carboxymethylcellulose Sod PF (REFRESH PLUS) 0.5 % SOLN ophthalmic solution Place 1 drop into both eyes 4 times daily as needed for dry eyes   Reported, Patient   gabapentin (NEURONTIN) 100 MG capsule Take 300  "mg by mouth 2 times daily    Unknown, Entered By History   HydrALAZINE HCl (APRESOLINE PO) Take 10 mg by mouth 3 times daily    Reported, Patient   multivitamin (OCUVITE) TABS Take 1 tablet by mouth daily   Reported, Patient   OXYCODONE HCL PO Take 2.5 mg by mouth 4 times daily and q 8 hours prn   Reported, Patient   polyethylene glycol (MIRALAX/GLYCOLAX) packet Take 1 packet by mouth daily   Unknown, Entered By History   VITAMIN D, CHOLECALCIFEROL, PO Take 1,000 Units by mouth daily   Reported, Patient   Warfarin Therapy Reminder 1 each continuous prn   Reported, Patient                 Review of Systems:   A Comprehensive greater than 10 system review of systems was carried out.  Pertinent positives and negatives are noted above.  Otherwise negative for contributory information.           Physical Exam:   Blood pressure 128/60, pulse 102, temperature 95.5  F (35.3  C), temperature source Oral, resp. rate 18, height 1.626 m (5' 4\"), weight 66.7 kg (147 lb), SpO2 98 %.  Exam:    General:  Pleasant nad looks stated age  HEENT:  Head nc/at sclera clear PERRL O/P:  Moist mucus membranes no posterior pharyngeal erythema or exudate.  Neck is supple  Lungs: cta b nl effort   CV:  RRR 3/6 harsh MIHIR, no CV edema, slight edema RLE  Abd:  S/nt/nd no r/g  Neuro:  Cn 2-12 grossly intact krishna   Alert and oriented affect appropriate   Skin:  W/d no c/c               Data:          Lab Results   Component Value Date     08/13/2018    Lab Results   Component Value Date    CHLORIDE 98 08/13/2018    Lab Results   Component Value Date    BUN 29 08/13/2018      Lab Results   Component Value Date    POTASSIUM 4.4 08/13/2018    Lab Results   Component Value Date    CO2 38 08/13/2018    Lab Results   Component Value Date    CR 1.30 08/13/2018        Lab Results   Component Value Date    WBC 16.1 (H) 08/13/2018    HGB 8.9 (L) 08/13/2018    HCT 29.3 (L) 08/13/2018     (H) 08/13/2018     08/13/2018     Lab Results "   Component Value Date     (H) 08/13/2018     Lab Results   Component Value Date    AST 15 08/13/2018    ALT 13 08/13/2018    ALKPHOS 61 08/13/2018    BILITOTAL 0.4 08/13/2018     UA:  Showing just protein and no inflammatory markers   INR 2.6          Imaging:     Recent Results (from the past 24 hour(s))   CT Head w/o Contrast    Narrative    CT SCAN OF THE HEAD WITHOUT CONTRAST   8/13/2018 10:45 PM     HISTORY: Fall.    TECHNIQUE: Axial images of the head and coronal reformations without  IV contrast material.  Radiation dose for this scan was reduced using  automated exposure control, adjustment of the mA and/or kV according  to patient size, or iterative reconstruction technique.    COMPARISON: 11/24/2010.    FINDINGS: Cerebral atrophy is present. Patchy white matter changes are  seen in both hemispheres without mass effect. Vascular calcifications  are noted. There is no evidence for intracranial hemorrhage, mass  effect, acute infarct, or skull fracture. Trace amount mucosal  thickening noted in the paranasal sinuses which are otherwise clear as  visualized. Mastoid air cells are clear.      Impression    IMPRESSION: Chronic changes. No evidence for intracranial hemorrhage  or any acute process.    GLEN WHALEN MD   Cervical spine CT w/o contrast    Narrative    CT CERVICAL SPINE WITHOUT CONTRAST   8/13/2018 10:48 PM     HISTORY: Fall, neck pain.       TECHNIQUE: Axial images of the cervical spine were obtained without  intravenous contrast. Multiplanar reformations were performed.  Radiation dose for this scan was reduced using automated exposure  control, adjustment of the mA and/or kV according to patient size, or  iterative reconstruction technique.     COMPARISON: None.    FINDINGS: Sagittal images demonstrate grade 1 degenerative  spondylolisthesis at C6-C7 and C7-T1. There is no evidence for  fracture. Multilevel degenerative disc and facet disease is present.  There are some mild central canal  stenoses in the lower cervical  region. Paraspinal soft tissues are unremarkable as visualized.      Impression    IMPRESSION: Degenerative changes. No evidence for fracture.    GLEN WHALEN MD   XR Shoulder Bilateral G/E 2 Views    Narrative    XR SHOULDER BILATERAL GREATER THAN 2 VIEWS   8/13/2018 10:54 PM     HISTORY: Fall.     COMPARISON: None.      Impression    IMPRESSION:   Left shoulder: Reverse shoulder arthroplasty. No evidence of acute  fracture or subluxation.    Right shoulder: Moderate glenohumeral degenerative changes. No  evidence of acute fracture or subluxation. Differing densities in the  glenoid are likely due to asymmetry of overlying soft tissues.    JUAN ALEXANDER MD   XR Chest 2 Views    Narrative    CHEST 2 VIEWS   8/13/2018 11:56 PM     HISTORY: Fever.    COMPARISON: 11/24/2010.    FINDINGS: Hypoinflated lungs.  A few hazy opacities are present in the  mid and inferior left lung and right lung base. Probable cardiomegaly  again noted. Small right pleural effusion. Moderate-sized hiatal  hernia. Left shoulder arthroplasty.      Impression    IMPRESSION:   1. A few hazy opacities in the mid and inferior left lung and right  lung base are nonspecific and could represent pneumonia and/or  atelectasis.  2. Small right pleural effusion.    JUAN VELASQUEZ MD

## 2018-08-14 NOTE — PLAN OF CARE
Problem: Patient Care Overview  Goal: Plan of Care/Patient Progress Review  Discharge Planner SLP   Patient plan for discharge: did not state  Current status: SLP: Pt presents with mild oropharyngeal dysphagia on clinical swallow evaluation characterized by prolonged oral phase with regular solids. Evaluation limited as pt denied oral motor exam and required encouragement for small amount of PO trials. Adequate oral phase and no overt s/sx of aspiration. No overt s/sx of aspiration with regular solids with mild oral residue following. History of esophageal stricture and dilation. Pt did not present with any s/sx of esophageal dysfunction at this time. Recommend: mechanical/dental soft diet, thin liquids; pt upright for all intake with small bites and sips, slow rate, alternating solids and liquids and check oral cavity after meals.   Barriers to return to prior living situation: None from ST  Recommendations for discharge: Return to LTC  Rationale for recommendations: Pt is likely near baseline with ST to follow 3x/wk for meal follow up. Pt may benefit from short term ST at LTC to train caregivers on safe swallow strategies.        Entered by: Cathleen London 08/14/2018 9:03 AM

## 2018-08-14 NOTE — PLAN OF CARE
Problem: Patient Care Overview  Goal: Plan of Care/Patient Progress Review  Outcome: No Change  Orientation: Alert and oriented x2. Disoriented to situation and partial time. Sleepy.   VSS. 98% on 4L Oximask. Afebrile. .   LS: clear and equal bilaterally.   GI: Passing gas. no BM. Denies N/V.   : Adequate urine output. Clear yellow. Incontinent.   Skin: skin tear to left hand. Dressing replaced. Blanchable redness with healing scabs to coccyx. Dry, red skin patch to top of right foot. Bilateral 2+ edema to top of feet only.   Activity: 2 assist. Repositioned. Pt slept comfortably throughout shift.   Pain: 2/10 right leg pain with movement or touching. No pain at rest. No PRN interventions utilized.   Plan: Continue with current cares.

## 2018-08-14 NOTE — PROGRESS NOTES
[facility]    Hospitalist Progress Note      Assessment & Plan   Celeste Hawthorne is a 89 year old female with PMH of CKD with baseline creat in 1.5-1.7 range, htn, chronic hypoxic respiratory failure on 2 L/NC continuously, hx of DVT on chronic warfarin therapy, chronic pain on qid low dose oxycodone who presents from [facility] facility after falling out of bed.      1.  Fall out of bed- exact details are unclear, but sounds like she was trying to get up to go to the bathroom and didn't use her walker and fell.  She did hit her head as well. NCHCT in ER without e/o acute hemorrhage (patient on warfarin), and cervical spine without acute fracture     2. Infiltrates by CXR suspect Pneumonia    Has h/o of some infiltrates on CXR per rads report. Temp 100.3 and WBC of 16 on admission. Procal is elevated to 12.   -- given elevated procal, will treat as PNA--continue with ceftriaxone and Z-frank  -- blood cultures are no growth to date  -- SLP eval and rec mechanical soft diet  -- wbc is trending down and 13 this am  -- monitor fever and CBC    3.  Hx of chronic RLE DVT:  On chronic warfarin-will have pharmacy manage although given age one could make the argument that with risk of falls may not be a candidate for further AC-defer to primary     4.  Htn/le edema:  pta atenolol 25 mg, hydralazine 10 mg tid and bumetanide 1 mg every day-resumed     5.  CKD:  Improved from baseline 1.5-1.7 range.      6.  Harsh MIHIR-suspect aortic sclerosis vs stenosis-unable to locate echo report in chart.      7.  Chronic pain:  Cont with home regimen of gabapentin 300 mg bid and oxycodone 2.5 mg qid and q 8hours prn     Functional status: Walks with a walker, lives in LTC    # Pain Assessment:  Current Pain Score 8/14/2018   Patient currently in pain? sleeping: patient not able to self report   Pain score (0-10) -   Pain location -   Pain descriptors -   Celeste alcantara pain level was assessed and she currently denies pain.       DVT Prophylaxis: Warfarin  Code Status: DNR/DNI    Disposition: Expected discharge in 1 days if remains afebrile and WBC trending down    Harshad Kitchen MD  Text Page  (7am to 6pm)    Interval History   Denies pain. Does not complain of much cough. She denies nausea or vomiting. Has been afebrile since admission    -Data reviewed today: I reviewed all new labs and imaging results over the last 24 hours. I personally reviewed no images or EKG's today.    Physical Exam   Temp: 96.4  F (35.8  C) Temp src: Axillary BP: 112/58 Pulse: 102 Heart Rate: 90 Resp: 18 SpO2: 99 % O2 Device: Oxymask Oxygen Delivery: 4 LPM  Vitals:    08/14/18 0232   Weight: 66.7 kg (147 lb)     Vital Signs with Ranges  Temp:  [95.5  F (35.3  C)-100.3  F (37.9  C)] 96.4  F (35.8  C)  Pulse:  [102] 102  Heart Rate:  [] 90  Resp:  [18-20] 18  BP: ()/(50-76) 112/58  SpO2:  [91 %-100 %] 99 %       Constitutional: Sleeping, easily arouses and appropriate  Respiratory: Diminished BS at the bases  Cardiovascular: regular rate and rhythm  GI: soft and non-tender  Skin/Integumen: warm and dry  Other:      Medications     - MEDICATION INSTRUCTIONS -       Warfarin Therapy Reminder         acetaminophen (TYLENOL) tablet 1,000 mg  1,000 mg Oral TID     atenolol  25 mg Oral Daily     [START ON 8/15/2018] azithromycin  250 mg Intravenous Q24H     bumetanide (BUMEX) tablet 1 mg  1 mg Oral Daily     cefTRIAXone  2 g Intravenous Q24H     gabapentin  300 mg Oral BID     hydrALAZINE (APRESOLINE) tablet 10 mg  10 mg Oral TID     oxyCODONE IR (ROXICODONE) half-tab 2.5 mg  2.5 mg Oral 4x Daily     polyethylene glycol  17 g Oral Daily     sodium chloride (PF)  3 mL Intracatheter Q8H     warfarin  3 mg Oral ONCE at 18:00       Data     Recent Labs  Lab 08/14/18  0724 08/13/18  2328   WBC 12.7* 16.1*   HGB 8.5* 8.9*   * 104*    174   INR  --  2.60*    139   POTASSIUM 4.8 4.4   CHLORIDE 102 98   CO2 37* 38*   BUN 28 29   CR 1.25*  1.30*   ANIONGAP 3 3   HEIDY 8.6 8.9   GLC 97 130*   ALBUMIN 2.5* 2.7*   PROTTOTAL 5.5* 5.8*   BILITOTAL 0.5 0.4   ALKPHOS 58 61   ALT 12 13   AST 13 15       Recent Results (from the past 24 hour(s))   CT Head w/o Contrast    Narrative    CT SCAN OF THE HEAD WITHOUT CONTRAST   8/13/2018 10:45 PM     HISTORY: Fall.    TECHNIQUE: Axial images of the head and coronal reformations without  IV contrast material.  Radiation dose for this scan was reduced using  automated exposure control, adjustment of the mA and/or kV according  to patient size, or iterative reconstruction technique.    COMPARISON: 11/24/2010.    FINDINGS: Cerebral atrophy is present. Patchy white matter changes are  seen in both hemispheres without mass effect. Vascular calcifications  are noted. There is no evidence for intracranial hemorrhage, mass  effect, acute infarct, or skull fracture. Trace amount mucosal  thickening noted in the paranasal sinuses which are otherwise clear as  visualized. Mastoid air cells are clear.      Impression    IMPRESSION: Chronic changes. No evidence for intracranial hemorrhage  or any acute process.    GLEN WHALEN MD   Cervical spine CT w/o contrast    Narrative    CT CERVICAL SPINE WITHOUT CONTRAST   8/13/2018 10:48 PM     HISTORY: Fall, neck pain.       TECHNIQUE: Axial images of the cervical spine were obtained without  intravenous contrast. Multiplanar reformations were performed.  Radiation dose for this scan was reduced using automated exposure  control, adjustment of the mA and/or kV according to patient size, or  iterative reconstruction technique.     COMPARISON: None.    FINDINGS: Sagittal images demonstrate grade 1 degenerative  spondylolisthesis at C6-C7 and C7-T1. There is no evidence for  fracture. Multilevel degenerative disc and facet disease is present.  There are some mild central canal stenoses in the lower cervical  region. Paraspinal soft tissues are unremarkable as visualized.      Impression     IMPRESSION: Degenerative changes. No evidence for fracture.    GLEN WHALEN MD   XR Shoulder Bilateral G/E 2 Views    Narrative    XR SHOULDER BILATERAL GREATER THAN 2 VIEWS   8/13/2018 10:54 PM     HISTORY: Fall.     COMPARISON: None.      Impression    IMPRESSION:   Left shoulder: Reverse shoulder arthroplasty. No evidence of acute  fracture or subluxation.    Right shoulder: Moderate glenohumeral degenerative changes. No  evidence of acute fracture or subluxation. Differing densities in the  glenoid are likely due to asymmetry of overlying soft tissues.    JUAN ALEXANDER MD   XR Chest 2 Views    Narrative    CHEST 2 VIEWS   8/13/2018 11:56 PM     HISTORY: Fever.    COMPARISON: 11/24/2010.    FINDINGS: Hypoinflated lungs.  A few hazy opacities are present in the  mid and inferior left lung and right lung base. Probable cardiomegaly  again noted. Small right pleural effusion. Moderate-sized hiatal  hernia. Left shoulder arthroplasty.      Impression    IMPRESSION:   1. A few hazy opacities in the mid and inferior left lung and right  lung base are nonspecific and could represent pneumonia and/or  atelectasis.  2. Small right pleural effusion.    JUAN VELASQUEZ MD

## 2018-08-14 NOTE — ED TRIAGE NOTES
Patient here for unwitnessed fall tonight, has been getting up by her self recently, was at the floor for about 45 minutes. C/o bumping back side of her head, right leg pain, and shoulder pain. Sustain a skin tear to left hand. Denies Is on warfarin for history of blood clots. ABCs intact, gcs 15, AA&Ox3.

## 2018-08-14 NOTE — CONSULTS
.Care Transition Initial Assessment - SW  Reason For Consult: discharge planning. Per request of RN to speak to daughter regarding bed hold @ [facility] and planning for transportation when pt is ready for hospital discharge.   Met with: DaughterCandy  Active Problems:    Pneumonia       DATA  Lives With: facility resident  Living Arrangements: other (see comments) (LTC @ [facility])  Description of Support System: Supportive, Involved     Support Assessment: Adequate family and caregiver support.              Quality Of Family Relationships: supportive, involved  Transportation Available:  (to be determined)    INTERVENTION     Met with daughter (Candy) this afternoon who informs that they do have a bed hold at [facility] at this time noting that her sister, Donya has spoken to the , Elvia at [facility] to gain understanding of their bed hold policy. She further informs that they are uncertain at this time if pt's MA will be paying for the bed hold, or if this would be a private pay hold and that it would not be determined until the end of the month how this cost will be determined based on state regulations of the bed hold policy. Candy wanted today to assure SW knowledge of pt's hospitalization and to inform that family wishes that medical transport be arranged for pt upon discharge. It was noted to daughter that SW will follow, once determined by MD that pt would be ready for discharge SW will assess with RN the mode  ( w/c vs stretcher) of transport and arrange accordingly. SW has noted to daughter that the transport would be billed to pt's insurance, but that SW is unable to guarantee coverage which she acknowledged understanding of.     ASSESSMENT  Good family support     PLAN  Financial costs for the patient includes transport, see above  Patient/family is agreeable to the plan?  Yes:   Patient/family anticipates discharging to:   LTC at [facility].

## 2018-08-14 NOTE — ED PROVIDER NOTES
History     Chief Complaint:  Fall    HPI     The patient's history is limited secondary to being a poor historian.    Celeste Hawthorne is a 89 year old female who presents to the ED via EMS after a fall. Approximately 45 minutes prior to arrival of EMS, the patient encountered an unwitnessed fall while attempting to get out of bed. The patient reports that she was not able to wait for the staff at her nursing home to take her to the bathroom, so she tried going by herself. The patient reports striking the occipital part of her head during the fall, and she obtained a skin tear on her left hand. She was lying on her stomach on the floor before nursing staff called EMS. Here in the ED, she has complaints of a headache and neck pain, along with right leg pain and bilateral shoulder pain. Of note, she is anticoagulated and is mobile using a wheelchair.      Allergies:  Amoxicillin    Medications:    Lac-Hydrin  Tenormin  Bumetanide  Refresh Plus  Neurontin  Apresoline  Ocuvite  Oxycodone  Warfarin  Miralax    Past Medical History:    Anemia  Breast cancer  Constipation  Blood transfusion  HTN  Macular degeneration  Malignant neoplasm  Falls  DVT  Gait instability  Pleural effusion  Femur fracture  CKD    Past Surgical History:    Right mastectomy  Esophagoscopy  Cataract removal  Hemorrhoidectomy  Open reduction with internal fixation, femur  Esophageal repair  Shoulder arthroplasty    Family History:    Cancer    Social History:  Marital Status:   [5]  Negative for tobacco use.  Alcohol use: yes    Review of Systems   Unable to perform ROS: Age   Musculoskeletal: Positive for arthralgias, myalgias and neck pain.   Skin: Positive for wound.   Neurological: Positive for headaches.   All other systems reviewed and are negative.    Physical Exam     Patient Vitals for the past 24 hrs:   BP Temp Temp src Pulse Heart Rate Resp SpO2   08/14/18 0016 - 99.5  F (37.5  C) Rectal - - - 98 %   08/14/18 0015 121/60 - - - -  - -   08/14/18 0000 110/55 - - - - - 95 %   08/13/18 2315 116/59 - - - - - 97 %   08/13/18 2300 110/53 - - - - - -   08/13/18 2215 99/50 - - - - - 100 %   08/13/18 2200 105/55 - - - - - 97 %   08/13/18 2145 109/64 - - - - - -   08/13/18 2130 100/53 - - - - - 91 %   08/13/18 2115 116/67 - - - - - 91 %   08/13/18 2106 119/61 100.3  F (37.9  C) Oral 102 102 20 92 %       Physical Exam  GEN- alert, cooperative  HEENT- atraumatic, PERRL, EOMI, MMM, oral pharynx without abnormalities, no dental injuries, midface stable, TM's clear bilaterally. Slight TTP posterior scalp in occipital region  NECK- ROM, soft, supple, mild upper midline C spine tenderness to palpation, no abrasions  RESP- CTAB, no w/r/r, chest wall nontender, no crepitus, symmetrical chest wall movement  CV- RRR, no m/r/g  ABD- soft, NT/ND, +BS  MSK- normal ROM in all extremities, pelvis stable to AP and lateral compression, no T and L spinal tenderness in the midline, 5/5 strength in all extremities. B upper shoulder TTP without deformity or bruising. Small skin tear L dorsum of hand  NEURO- GCS 15, speech normal, alert, 5/5 strength x 4, sensation to light touch intact in all extremities,  strong bilaterally  SKIN- no rash, no bruising, no ecchymosis,  PSYCH- normal mood, normal behavior, normal thought process      Emergency Department Course   Imaging:  Radiographic findings were communicated with the patient and family who voiced understanding of the findings.  XR Chest 2 views:   1. A few hazy opacities in the mid and inferior left lung and right  lung base are nonspecific and could represent pneumonia and/or  atelectasis.  2. Small right pleural effusion, as per radiology.     XR Shoulder 2 views,   Left shoulder: Reverse shoulder arthroplasty. No evidence of acute  fracture or subluxation.    Right shoulder: Moderate glenohumeral degenerative changes. No  evidence of acute fracture or subluxation. Differing densities in the  glenoid are likely  due to asymmetry of overlying soft tissues, as per radiology.     CT Cervical Spine without contrast:   Degenerative changes. No evidence for fracture, as per radiology.    CT Head without contrast:   Chronic changes. No evidence for intracranial hemorrhage  or any acute process, as per radiology.    Laboratory:  CBC: WBC: 16.1 (H), HGB: 8.9 (L), PLT: 174  CMP: Glucose 130 (H), Albumin 2.7 (L), Protein total 5.8 (L), Carbon dioxide 38 (H), GFR Estimate 37 (L), Creatinine 1.30 (H), o/w WNL     2328 Lactic acid: 0.8    UA with Microscopic: Protein albumin 30 (A), o/w WNL    Blood cultures (X2): pending    INR: 2.6 (H)    Interventions:  2330 NS 1L IV    Emergency Department Course:  Nursing notes and vitals reviewed. (2106) I performed an exam of the patient as documented above.     IV inserted. Medicine administered as documented above. Blood drawn. This was sent to the lab for further testing, results above.    The patient provided a urine sample here in the emergency department. This was sent for laboratory testing, findings above.     The patient was sent for a chest and shoulder x-ray and head and spinal CT while in the emergency department, findings above.     (1738) I rechecked the patient and discussed the results of her workup thus far. At this point, she had a fever.      I personally reviewed the laboratory results with the Patient and daughter and answered all related questions.    Impression & Plan    Medical Decision Making:  The patient presents after a fall. After being found on the bathroom floor, she says that she was trying to use the bathroom without waiting for the aide to help her. Per the daughter, she was supposed to wait for them. She denies any pain other than the back of her head where she hit it and her shoulders. She otherwise has been doing okay per her family. Her lab workup so far is negative, but she was notified that she had a temperature of 100.3 by the nurse. The family said they  were not aware that she has had a fever recently, and this was not reported by the nursing home. At this point, we will initiate a fever work up. She is sleeping now, and I will pass her work up onto Dr. Hargrove, as this was a late notification by the nurse. The patient will need to be worked up, and depending on the results may need to be admitted for infection. The family feels comfortable and we will updated them by phone if needed. ED diagnosis is closed head injury, left hand abrasion, shoulder contusion, and fever.    Diagnosis:    ICD-10-CM    1. Closed head injury, initial encounter S09.90XA CBC with platelets differential     INR     Comprehensive metabolic panel     Lactic acid whole blood     UA with Microscopic     Blood culture   2. Abrasion of left hand, initial encounter S60.512A    3. Contusion of shoulder, unspecified laterality, initial encounter S40.019A    4. Pneumonia of right lung due to infectious organism, unspecified part of lung J18.9        Scribe Disclosure:  I, Madison Mejia, am serving as a scribe on 8/13/2018 at 9:06 PM to personally document services performed by Marleen Kim MD based on my observations and the provider's statements to me.     Madison Mejia  8/13/2018   [facility] EMERGENCY DEPARTMENT       Marleen Kim MD  08/15/18 0768

## 2018-08-14 NOTE — ED NOTES
Patient was clean and changed, did had a moderate amount of formed brown color bowel movement. Blanchable skin color to sacrum.

## 2018-08-14 NOTE — PHARMACY-ANTICOAGULATION SERVICE
Clinical Pharmacy - Warfarin Dosing Consult     Pharmacy has been consulted to manage this patient s warfarin therapy.  Indication: DVT/ PE Treatment  Therapy Goal: INR 2-3  Warfarin Prior to Admission: Yes  Warfarin PTA Regimen: 5 mg on Tuesday , 3 mg ROW  Significant drug interactions: zithromax  Recent documented change in oral intake/nutrition: Unknown    INR   Date Value Ref Range Status   08/13/2018 2.60 (H) 0.86 - 1.14 Final   09/30/2016 1.01 0.86 - 1.14 Final       Recommend warfarin 3 mg today.  Pharmacy will monitor Celeste Hawthorne daily and order warfarin doses to achieve specified goal.      Please contact pharmacy as soon as possible if the warfarin needs to be held for a procedure or if the warfarin goals change.

## 2018-08-14 NOTE — PLAN OF CARE
Problem: Patient Care Overview  Goal: Plan of Care/Patient Progress Review  Outcome: Improving  Oriented to self. Pleasantly confused. Slept majority of shift. On 2L O2, uses O2 chronically per daughter. Lung sounds coarse in bases. Tolerating mechanical soft diet. Up to bathroom with Ax2, gait belt, and Mima Steady. Edema in RLE and L foot baseline. Voiding in adequate amounts. Pain managed with scheduled Tylenol and Oxycodone. On IV Rocephin and Zithromax.

## 2018-08-14 NOTE — PLAN OF CARE
Problem: Patient Care Overview  Goal: Plan of Care/Patient Progress Review  Discharge Planner OT   Patient plan for discharge: return to LTC at [facility]  Current status: OT orders received, chart reviewed. Pt admitted with a fall and PNA. Discussed with pt's daughter in the pt room to verify PLOF; at baseline pt receives total assist with all self cares, uses a wheelchair for all mobility, and stand-pivot transfers with assist. No skilled OT/PT needs; will complete the IP OT order and sign off.   Barriers to return to prior living situation: defer to care team  Recommendations for discharge: return to LTC   Rationale for recommendations: At baseline, pt receives total assist for all cares and mobility; no skilled OT/PT needs. Will sign off.        Entered by: Kathy Simpson 08/14/2018 1:42 PM

## 2018-08-14 NOTE — ED PROVIDER NOTES
Emergency Department Patient Sign-out       Brief HPI:  This is a 89 year old female signed out to me by Dr. Kim .  See initial ED Provider note for details of the presentation.     Significant Events prior to my assuming care: 89-year-old female presenting status post fall for further evaluation.  Imaging as noted in initial ED provider note negative for acute traumatic injury.  Patient has elevated temperature noted during ED stay.  Initial team proceeded with infectious workup which is pending at sign out.      Exam:   Patient Vitals for the past 24 hrs:   BP Temp Temp src Pulse Heart Rate Resp SpO2   08/13/18 2315 116/59 - - - - - 97 %   08/13/18 2300 110/53 - - - - - -   08/13/18 2215 99/50 - - - - - 100 %   08/13/18 2200 105/55 - - - - - 97 %   08/13/18 2145 109/64 - - - - - -   08/13/18 2130 100/53 - - - - - 91 %   08/13/18 2115 116/67 - - - - - 91 %   08/13/18 2106 119/61 100.3  F (37.9  C) Oral 102 102 20 92 %           ED RESULTS:   Results for orders placed or performed during the hospital encounter of 08/13/18 (from the past 24 hour(s))   CT Head w/o Contrast     Status: None    Collection Time: 08/13/18 10:45 PM    Narrative    CT SCAN OF THE HEAD WITHOUT CONTRAST   8/13/2018 10:45 PM     HISTORY: Fall.    TECHNIQUE: Axial images of the head and coronal reformations without  IV contrast material.  Radiation dose for this scan was reduced using  automated exposure control, adjustment of the mA and/or kV according  to patient size, or iterative reconstruction technique.    COMPARISON: 11/24/2010.    FINDINGS: Cerebral atrophy is present. Patchy white matter changes are  seen in both hemispheres without mass effect. Vascular calcifications  are noted. There is no evidence for intracranial hemorrhage, mass  effect, acute infarct, or skull fracture. Trace amount mucosal  thickening noted in the paranasal sinuses which are otherwise clear as  visualized. Mastoid air cells are clear.      Impression     IMPRESSION: Chronic changes. No evidence for intracranial hemorrhage  or any acute process.    GLEN WHALEN MD   Cervical spine CT w/o contrast     Status: None    Collection Time: 08/13/18 10:48 PM    Narrative    CT CERVICAL SPINE WITHOUT CONTRAST   8/13/2018 10:48 PM     HISTORY: Fall, neck pain.       TECHNIQUE: Axial images of the cervical spine were obtained without  intravenous contrast. Multiplanar reformations were performed.  Radiation dose for this scan was reduced using automated exposure  control, adjustment of the mA and/or kV according to patient size, or  iterative reconstruction technique.     COMPARISON: None.    FINDINGS: Sagittal images demonstrate grade 1 degenerative  spondylolisthesis at C6-C7 and C7-T1. There is no evidence for  fracture. Multilevel degenerative disc and facet disease is present.  There are some mild central canal stenoses in the lower cervical  region. Paraspinal soft tissues are unremarkable as visualized.      Impression    IMPRESSION: Degenerative changes. No evidence for fracture.    GLEN WHALEN MD   XR Shoulder Bilateral G/E 2 Views     Status: None    Collection Time: 08/13/18 10:54 PM    Narrative    XR SHOULDER BILATERAL GREATER THAN 2 VIEWS   8/13/2018 10:54 PM     HISTORY: Fall.     COMPARISON: None.      Impression    IMPRESSION:   Left shoulder: Reverse shoulder arthroplasty. No evidence of acute  fracture or subluxation.    Right shoulder: Moderate glenohumeral degenerative changes. No  evidence of acute fracture or subluxation. Differing densities in the  glenoid are likely due to asymmetry of overlying soft tissues.    JUAN ALEXANDER MD       ED MEDICATIONS:   Medications   lidocaine 1 % 1 mL (not administered)   lidocaine (LMX4) kit (not administered)   sodium chloride (PF) 0.9% PF flush 3 mL (not administered)   sodium chloride (PF) 0.9% PF flush 3 mL (not administered)   0.9% sodium chloride BOLUS (not administered)     Followed by   sodium chloride 0.9%  infusion (not administered)         Impression:    ICD-10-CM    1. Closed head injury, initial encounter S09.90XA    2. Abrasion of left hand, initial encounter S60.512A    3. Contusion of shoulder, unspecified laterality, initial encounter S40.019A        Plan:    Pending studies include blood work, chest x-ray, urinalysis.  Labs significant for leukocytosis, UA inconsistent infection.  CXR significant for infiltrates, possible pneumonia.  Given leukocytosis, pt treated for HCAP.  Per primary teams recs that pt will likely require admission for infection given previous conversation with family, pt subsequently admitted to hospitalist service.  Pt informed of results, diagnosis and disposition.  Family had departed by times of results and admission.      Jeremías Garcia MD  08/16/18 0006

## 2018-08-14 NOTE — PROGRESS NOTES
ROOM # 625    Living Situation: Long Term Care  Facility name: [facility]    Activity level at baseline: wheelchair bound   Activity level on admit: total care    Is patient a falls risk? Yes  Reason for falls risk:  Mobility, Reason for falls risk: Elimination and Reason for falls risk: Cognition  Falls armband on? YES  Within Arm's Reach? Yes   Bed alarm turned on?   YES    Patient given Patient Bill of Rights; given the opportunity to ask questions about status and their plan of care.  Patient has been oriented to the room, bathroom and call light is in place.    : Candy, Daughter

## 2018-08-14 NOTE — ED NOTES
[facility]  ED Nurse Handoff Report    Celeste Hawthorne is a 89 year old female   ED Chief complaint: Fall  . ED Diagnosis:   Final diagnoses:   Closed head injury, initial encounter   Abrasion of left hand, initial encounter   Contusion of shoulder, unspecified laterality, initial encounter     Allergies:   Allergies   Allergen Reactions     Amoxicillin        Code Status: DNR / DNI  Activity level - Baseline/Home:  Total Care - uses wheelchair for transport. Activity Level - Current:   Total Care. Lift room needed: No. Bariatric: No   Needed: No   Isolation: No. Infection: Not Applicable.     Vital Signs:   Vitals:    08/13/18 2315 08/14/18 0000 08/14/18 0015 08/14/18 0016   BP: 116/59 110/55 121/60    Pulse:       Resp:       Temp:    99.5  F (37.5  C)   TempSrc:    Rectal   SpO2: 97% 95%  98%       Cardiac Rhythm:  ,      Pain level:    Patient confused: Yes - short term memory loss, history of dementia. Patient Falls Risk: Yes.   Elimination Status: wears depends; straight cath for UA and to drain; had a bowel movement with formed brown colored stool   Patient Report - Initial Complaint: Patient presented to ED for unwitnessed fall at patient's care facility. Staff found patient and called for transport ride to ED. Per ems, patient was on the floor approximately for 45 minutes after the fall. Per daughter, patient would need to press on the call light for help whenever patient needs to use the restroom. Lately, patient has not been able to wait long enough for help and has had attempted to get to the restroom by herself without assistance. Patient did stated that she bump the back side of her head. Patient also complained of shoulder pain, chronic leg pain, and generalized body ache after the fall. Patient has history of dementia with short term memory loss and was unable to provide much history of this fall. Patient did sustain a skin tear to her left hand, which bleeding was control.  Patient is currently on warfarin. Focused Assessment: GCS 14, history of dementia. Respiration even and unlabored. Skin tear to dorsum aspect of right hand between thumb and index finger.  Tests Performed: labs, CT head, CT cervical, CXR. Abnormal Results:   Labs Ordered and Resulted from Time of ED Arrival Up to the Time of Departure from the ED   CBC WITH PLATELETS DIFFERENTIAL - Abnormal; Notable for the following:        Result Value    WBC 16.1 (*)     RBC Count 2.82 (*)     Hemoglobin 8.9 (*)     Hematocrit 29.3 (*)      (*)     MCHC 30.4 (*)     Absolute Neutrophil 14.7 (*)     Absolute Lymphocytes 0.4 (*)     All other components within normal limits   INR - Abnormal; Notable for the following:     INR 2.60 (*)     All other components within normal limits   COMPREHENSIVE METABOLIC PANEL - Abnormal; Notable for the following:     Carbon Dioxide 38 (*)     Glucose 130 (*)     Creatinine 1.30 (*)     GFR Estimate 37 (*)     GFR Estimate If Black 45 (*)     Albumin 2.7 (*)     Protein Total 5.8 (*)     All other components within normal limits   ROUTINE UA WITH MICROSCOPIC - Abnormal; Notable for the following:     Protein Albumin Urine 30 (*)     All other components within normal limits   LACTIC ACID WHOLE BLOOD   PULSE OXIMETRY NURSING   PERIPHERAL IV CATHETER   BLOOD CULTURE   BLOOD CULTURE     XR Chest 2 Views   Final Result   IMPRESSION:    1. A few hazy opacities in the mid and inferior left lung and right   lung base are nonspecific and could represent pneumonia and/or   atelectasis.   2. Small right pleural effusion.      JUAN VELASQUEZ MD      XR Shoulder Bilateral G/E 2 Views   Final Result   IMPRESSION:    Left shoulder: Reverse shoulder arthroplasty. No evidence of acute   fracture or subluxation.      Right shoulder: Moderate glenohumeral degenerative changes. No   evidence of acute fracture or subluxation. Differing densities in the   glenoid are likely due to asymmetry of overlying soft  tissues.      JUAN ALEXANDER MD      CT Head w/o Contrast   Final Result   IMPRESSION: Chronic changes. No evidence for intracranial hemorrhage   or any acute process.      GLEN WHALEN MD      Cervical spine CT w/o contrast   Final Result   IMPRESSION: Degenerative changes. No evidence for fracture.      GLEN WHALEN MD         Treatments provided: Monitor, Antibiotics, Fluids, Wound care.  Family Comments: Daughter of patient went home. Candy is the primary contact for information and updates.  OBS brochure/video discussed/provided to patient:  N/A  ED Medications:   Medications   lidocaine 1 % 1 mL (not administered)   lidocaine (LMX4) kit (not administered)   sodium chloride (PF) 0.9% PF flush 3 mL (not administered)   sodium chloride (PF) 0.9% PF flush 3 mL (not administered)   0.9% sodium chloride BOLUS (1,000 mLs Intravenous New Bag 8/13/18 2330)     Followed by   sodium chloride 0.9% infusion (not administered)   ceFEPIme (MAXIPIME) intermittent infusion 1 g (not administered)     Drips infusing:  Yes - fluids currently infusing and will start patient on antibiotics.  For the majority of the shift, the patient's behavior Green. Interventions performed were Dementia, does not get out of bed currently, follows commands, cooperative.     Severe Sepsis OR Septic Shock Diagnosis Present: No      ED Nurse Name/Phone Number: Julio Wall,   12:40 AM    RECEIVING UNIT ED HANDOFF REVIEW    Above ED Nurse Handoff Report was reviewed: Yes  Reviewed by: Magaly Deluca on August 14, 2018 at 1:56 AM

## 2018-08-15 NOTE — PHARMACY-ANTICOAGULATION SERVICE
Clinical Pharmacy- Warfarin Discharge Note  This patient is currently on warfarin for the treatment of DVT/PE treatment.  INR Goal= 2-3  Expected length of therapy undetermined.    Warfarin PTA Regimen: 5 mg on Tuesday , 3 mg ROW      Anticoagulation Dose History     Recent Dosing and Labs Latest Ref Rng & Units 5/21/2012 3/31/2015 4/1/2015 9/30/2016 8/13/2018 8/14/2018 8/15/2018    Warfarin 3 mg - - - - - - 3 mg -    Warfarin 5 mg - - 5 mg - - - - -    INR 0.86 - 1.14 0.88 0.94 1.06 1.01 2.60(H) - 2.18(H)        Agree with discharge orders to continue PTA regimen and check INR in two days.

## 2018-08-15 NOTE — PLAN OF CARE
Problem: Patient Care Overview  Goal: Plan of Care/Patient Progress Review  Outcome: Improving  Pt alert with slight confusion and forgetfulness at baseline. Soft BP, otherwise VSS. LS diminished but clear on 2LPM of O2 via NC per home routine. On IV rocephin and zithromax for PNA. becki mech soft diet well. up with A2 and sarasteady. Reports mild pain to RLE, denied need for additional pain medications, Edema+2 to RLE and +1 to L foot. voiding in good amts, strep urine sent. plans to return to [facility] when stable. Continue to monitor.

## 2018-08-15 NOTE — PLAN OF CARE
Problem: Pneumonia (Adult)  Goal: Signs and Symptoms of Listed Potential Problems Will be Absent, Minimized or Managed (Pneumonia)  Signs and symptoms of listed potential problems will be absent, minimized or managed by discharge/transition of care (reference Pneumonia (Adult) CPG).   Outcome: No Change  PM SHIFT  Pt alert and a little forgetful. Pt aware it is August but not the exact date. Also aware she is in the hospital but not sure why or name of the hospital. LS dims. IS encouraged. Sputum sent. Temp 99.0. Pt up to the BSC and WC for meal with assist of 2 and the andreas steady. Pt on oxygen at 2 liters nasal cannula per baseline. Pt refused hs oxycodone stating she has no pain unless she has to move her right leg. Pt has bilat edema to her feet. Plan is to return to [facility] on DC.

## 2018-08-15 NOTE — PLAN OF CARE
Problem: Patient Care Overview  Goal: Plan of Care/Patient Progress Review  Speech Language Therapy Discharge Summary    Reason for therapy discharge:    Discharged to long term care facility.    Progress towards therapy goal(s). See goals on [facility] record for goal details.  Goals not met.  Barriers to achieving goals:   discharge from facility.    Therapy recommendation(s):    Continued therapy is recommended.  Rationale/Recommendations:  Pt alert and finished breakfast meal this afternoon. Pt denied difficulty and ate 100% of meal, however, vocal quality did have mild wet quality that clear with cues to cough. Pt denied any PO trials at this time with upcoming DC. Would recommend short term ST to ensure diet tolerance and train pt/caregivers in safe swallow strategies. .

## 2018-08-15 NOTE — PLAN OF CARE
Problem: Patient Care Overview  Goal: Plan of Care/Patient Progress Review  Outcome: Adequate for Discharge Date Met: 08/15/18  Pt discharged back to [facility] with [facility] wheel [facility] transport.  Discharge instructions reviewed with pt, she verbalized understanding and all questions were answered.  IV removed.  Pt taken off unit via wheelchair with own O2.

## 2018-08-15 NOTE — PROGRESS NOTES
SWS     D: Discharge planning continuing, noted per MD that pt would eb ready for return [facility]. Per discussion with RN, w/c transport appropriate, pt's own supply of portable 02 was obtained from [facility] for the transport. GURJIT has contacted admissions coordinator at [facility] regarding discharge today, MD orders faxed.      I: GURJIT has today spoken to clari Candy and Donya regarding pt's discharge today, medical transport has been requested. [facility] transport arranged for 1400, with billing to pt's [employer] insurance. Previous discussion with daughterCandy in that SW would be unable to guarantee transport coverage, addressed questions of same today from renetta Donya noting cost of $470/base and $4.50 mi. Should insurance not cover, daughter acknowledges. Discussed plan with pt.    A/P: Anticipate no problem with arrangements made for transfer today, with discharge no further SWS.

## 2018-08-15 NOTE — DISCHARGE SUMMARY
[facility]    Discharge Summary  Hospitalist    Date of Admission:  8/13/2018  Date of Discharge:  8/15/2018  Discharging Provider: Harshad Kitchen  Date of Service (when I saw the patient): 08/15/18    Discharge Diagnoses   Fall  Infiltrate on CXR with suspected PNA  Anemia, macrocytic.    History of Present Illness   Celeste Hawthorne is a 89 year old female with PMH of CKD with baseline creat in 1.5-1.7 range, htn, chronic hypoxic respiratory failure on 2 L/NC continuously, hx of DVT on chronic warfarin therapy, chronic pain on qid low dose oxycodone who presents from [facility] facility after falling out of bed.  See H & P for detail.     Hospital Course   Celeste Hawthorne was admitted on 8/13/2018.  The following problems were addressed during her hospitalization:    Fall   Seems like that she fell out of bed- exact details are unclear, but sounds like she was trying to get up to go to the bathroom and didn't use her walker and fell.  She did hit her head as well. NCHCT in ER without e/o acute hemorrhage (patient on warfarin), and cervical spine without acute fracture      Infiltrates by CXR suspect Pneumonia    Has h/o of some infiltrates on CXR per rads report. Temp 100.3 and WBC of 16 on admission. Procal is elevated to 12.   -- given elevated procal,  treated as PNA  -- started on ceftriaxone and Z-frank in hospital, will discharge on 5 days of Ceftin BID and 3 more days of azithromycin  -- blood cultures remain no growth to date  -- SLP eval and rec mechanical soft diet  -- wbc has normalized and remained afebrile through hospital stay, note she was receiving tylenol for pain    Anemia, macrocytic  No report of melena or BRBPR in hospital. hgb last was around 10 and this admission in 8s.  She also has CKD so could be partly due to that.    -- given that she is on anticoagulation--started ferrous sulfate and PPI at discharge  -- recommend f/u CBC in 1 week. Recommend checking B12 and  folic level then as well     Hx of chronic RLE DVT:  On chronic warfarin-will have pharmacy manage although given age one could make the argument that with risk of falls may not be a candidate for further AC-defer to primary       Htn/le edema:  pta atenolol 25 mg, hydralazine 10 mg tid and bumetanide 1 mg every day-resumed       CKD:  Improved from baseline 1.5-1.7 range.       Harsh MIHIR-suspect aortic sclerosis vs stenosis-unable to locate echo report in chart.       Chronic pain:  Cont with home regimen of gabapentin 300 mg bid and oxycodone 2.5 mg qid and q 8hours prn      Functional status: Walks with a walker, lives in LTC    # Discharge Pain Plan:   - During her hospitalization, Celeste experienced pain due to chronic pain.  The pain plan for discharge was discussed with Celeste and the plan was created in a collaborative fashion.    - Chronic/continued opioids: oxycodone 2.5mg QID and q8ghrs prn--total 3tabs prescribed.     Harshad Kitchen    Significant Results and Procedures   See imaging and labs below.     Pending Results     Unresulted Labs Ordered in the Past 30 Days of this Admission     Date and Time Order Name Status Description    8/15/2018 0545 Strep pneumo Agn Ur greater or equal to 13yrs or CSF any age In process     8/14/2018 0228 Sputum Culture Aerobic Bacterial In process     8/13/2018 2330 Blood culture Preliminary     8/13/2018 2312 Blood culture Preliminary           Code Status   DNR / DNI       Primary Care Physician   Puja Casanova    Physical Exam   Temp: 98.6  F (37  C) Temp src: Oral BP: 140/69 Pulse: 89 Heart Rate: 89 Resp: 16 SpO2: 97 % O2 Device: Nasal cannula Oxygen Delivery: 2 LPM  Vitals:    08/14/18 0232 08/14/18 2329   Weight: 66.7 kg (147 lb) 67.6 kg (149 lb 1.6 oz)     Vital Signs with Ranges  Temp:  [96.4  F (35.8  C)-99  F (37.2  C)] 98.6  F (37  C)  Pulse:  [89] 89  Heart Rate:  [83-93] 89  Resp:  [14-18] 16  BP: (102-140)/(52-69) 140/69  SpO2:  [92 %-99 %] 97  %  I/O last 3 completed shifts:  In: 1240 [P.O.:1240]  Out: 300 [Urine:300]    Constitutional: up in a chair, alert awake and no apparent distress, [place]  Respiratory: faint crackles at the bases, upper lungs CTA  Cardiovascular: regular rate and rhythm  GI: soft and non-tender  Skin/Integumen: warm and dry    Discharge Disposition   Discharged to long-term care facility  Condition at discharge: Stable    Consultations This Hospital Stay   PHARMACY TO DOSE VANCO  PHARMACY TO DOSE WARFARIN  SWALLOW EVAL SPEECH PATH AT BEDSIDE IP CONSULT  PHYSICAL THERAPY ADULT IP CONSULT  OCCUPATIONAL THERAPY ADULT IP CONSULT  SOCIAL WORK IP CONSULT    Time Spent on this Encounter   IHarshad, personally saw the patient today and spent 35 minutes discharging this patient.    Discharge Orders     General info for SNF   Length of Stay Estimate: Long Term Care  Condition at Discharge: Stable  Level of care:skilled   Rehabilitation Potential: Fair  Admission H&P remains valid and up-to-date: Yes  Recent Chemotherapy: N/A  Use Nursing Home Standing Orders: Yes     Mantoux instructions   Give two-step Mantoux (PPD) Per Facility Policy Yes     Activity - Up with assistive device     Activity - Up with nursing assistance     Follow Up and recommended labs and tests   Follow up with long-term physician.  The following labs/tests are recommended: INR check in 2 days. .     DNR/DNI     Oxygen - Nasal cannula   2 Lpm by nasal cannula to keep O2 sats 92% or greater.     Fall precautions     Advance Diet as Tolerated   Follow this diet upon discharge: Orders Placed This Encounter     Combination Diet Mechanical/Dental Soft Diet;       Discharge Medications   Current Discharge Medication List      START taking these medications    Details   azithromycin (ZITHROMAX) 250 MG tablet Take 1 tablet (250 mg) by mouth daily for 3 days  Qty: 3 tablet, Refills: 0    Associated Diagnoses: Pneumonia of right lung due to infectious organism,  unspecified part of lung      cefuroxime (CEFTIN) 500 MG tablet Take 1 tablet (500 mg) by mouth 2 times daily for 5 days  Qty: 10 tablet, Refills: 0    Associated Diagnoses: Pneumonia of right lung due to infectious organism, unspecified part of lung         CONTINUE these medications which have CHANGED    Details   oxyCODONE IR (ROXICODONE) 5 MG tablet Take 0.5 tablets (2.5 mg) by mouth 4 times daily and q 8 hours prn  Qty: 3 tablet, Refills: 0    Associated Diagnoses: Other chronic pain         CONTINUE these medications which have NOT CHANGED    Details   ACETAMINOPHEN PO Take 1,000 mg by mouth 3 times daily       atenolol (TENORMIN) 25 MG tablet Take 25 mg by mouth daily.      Bumetanide (BUMEX PO) Take 1 mg by mouth daily       Carboxymethylcellulose Sod PF (REFRESH PLUS) 0.5 % SOLN ophthalmic solution Place 1 drop into both eyes 4 times daily as needed for dry eyes      gabapentin (NEURONTIN) 100 MG capsule Take 300 mg by mouth 2 times daily       HydrALAZINE HCl (APRESOLINE PO) Take 10 mg by mouth 3 times daily       multivitamin (OCUVITE) TABS Take 1 tablet by mouth daily      polyethylene glycol (MIRALAX/GLYCOLAX) packet Take 1 packet by mouth daily      VITAMIN D, CHOLECALCIFEROL, PO Take 1,000 Units by mouth daily      !! WARFARIN SODIUM PO Take 3 mg by mouth Sun, Mon, Wed, Thu, Fri, Sat      !! WARFARIN SODIUM PO Take 5 mg by mouth Tuesdays      Warfarin Therapy Reminder 1 each continuous prn       !! - Potential duplicate medications found. Please discuss with provider.        Allergies   Allergies   Allergen Reactions     Amoxicillin      Data   Most Recent 3 CBC's:  Recent Labs   Lab Test  08/15/18   0729  08/14/18   0724  08/13/18 2328   WBC  5.3  12.7*  16.1*   HGB  8.2*  8.5*  8.9*   MCV  107*  106*  104*   PLT  188  173  174      Most Recent 3 BMP's:  Recent Labs   Lab Test  08/14/18   0724  08/13/18 2328  06/14/18   0810   NA  142  139  140   POTASSIUM  4.8  4.4  4.3   CHLORIDE  102  98  100    CO2  37*  38*  37*   BUN  28  29  25   CR  1.25*  1.30*  1.24*   ANIONGAP  3  3  3   HEIDY  8.6  8.9  8.8   GLC  97  130*  80     Most Recent 2 LFT's:  Recent Labs   Lab Test  08/14/18   0724  08/13/18   2328   AST  13  15   ALT  12  13   ALKPHOS  58  61   BILITOTAL  0.5  0.4     Most Recent INR's and Anticoagulation Dosing History:  Anticoagulation Dose History     Recent Dosing and Labs Latest Ref Rng & Units 5/21/2012 3/31/2015 4/1/2015 9/30/2016 8/13/2018 8/14/2018 8/15/2018    Warfarin 3 mg - - - - - - 3 mg -    Warfarin 5 mg - - 5 mg - - - - -    INR 0.86 - 1.14 0.88 0.94 1.06 1.01 2.60(H) - 2.18(H)        Most Recent 3 Troponin's:  Recent Labs   Lab Test  11/24/10   1841   TROPI  0.012     Most Recent Cholesterol Panel:No lab results found.  Most Recent 6 Bacteria Isolates From Any Culture (See EPIC Reports for Culture Details):  Recent Labs   Lab Test  08/14/18   0039  08/13/18   2327  05/06/12   1632  11/25/10   0825  11/25/10   0812  11/24/10   2124   CULT  No growth after 23 hours  No growth after 23 hours  No growth  No growth after 6 days  No growth after 6 days  No growth     Most Recent TSH, T4 and A1c Labs:No lab results found.  Results for orders placed or performed during the hospital encounter of 08/13/18   CT Head w/o Contrast    Narrative    CT SCAN OF THE HEAD WITHOUT CONTRAST   8/13/2018 10:45 PM     HISTORY: Fall.    TECHNIQUE: Axial images of the head and coronal reformations without  IV contrast material.  Radiation dose for this scan was reduced using  automated exposure control, adjustment of the mA and/or kV according  to patient size, or iterative reconstruction technique.    COMPARISON: 11/24/2010.    FINDINGS: Cerebral atrophy is present. Patchy white matter changes are  seen in both hemispheres without mass effect. Vascular calcifications  are noted. There is no evidence for intracranial hemorrhage, mass  effect, acute infarct, or skull fracture. Trace amount mucosal  thickening  noted in the paranasal sinuses which are otherwise clear as  visualized. Mastoid air cells are clear.      Impression    IMPRESSION: Chronic changes. No evidence for intracranial hemorrhage  or any acute process.    GLEN WHALEN MD   XR Shoulder Bilateral G/E 2 Views    Narrative    XR SHOULDER BILATERAL GREATER THAN 2 VIEWS   8/13/2018 10:54 PM     HISTORY: Fall.     COMPARISON: None.      Impression    IMPRESSION:   Left shoulder: Reverse shoulder arthroplasty. No evidence of acute  fracture or subluxation.    Right shoulder: Moderate glenohumeral degenerative changes. No  evidence of acute fracture or subluxation. Differing densities in the  glenoid are likely due to asymmetry of overlying soft tissues.    JUAN ALEXANDER MD   Cervical spine CT w/o contrast    Narrative    CT CERVICAL SPINE WITHOUT CONTRAST   8/13/2018 10:48 PM     HISTORY: Fall, neck pain.       TECHNIQUE: Axial images of the cervical spine were obtained without  intravenous contrast. Multiplanar reformations were performed.  Radiation dose for this scan was reduced using automated exposure  control, adjustment of the mA and/or kV according to patient size, or  iterative reconstruction technique.     COMPARISON: None.    FINDINGS: Sagittal images demonstrate grade 1 degenerative  spondylolisthesis at C6-C7 and C7-T1. There is no evidence for  fracture. Multilevel degenerative disc and facet disease is present.  There are some mild central canal stenoses in the lower cervical  region. Paraspinal soft tissues are unremarkable as visualized.      Impression    IMPRESSION: Degenerative changes. No evidence for fracture.    GLEN WHALEN MD   XR Chest 2 Views    Narrative    CHEST 2 VIEWS   8/13/2018 11:56 PM     HISTORY: Fever.    COMPARISON: 11/24/2010.    FINDINGS: Hypoinflated lungs.  A few hazy opacities are present in the  mid and inferior left lung and right lung base. Probable cardiomegaly  again noted. Small right pleural effusion.  Moderate-sized hiatal  hernia. Left shoulder arthroplasty.      Impression    IMPRESSION:   1. A few hazy opacities in the mid and inferior left lung and right  lung base are nonspecific and could represent pneumonia and/or  atelectasis.  2. Small right pleural effusion.    JUAN VELASQUEZ MD

## 2018-08-16 NOTE — PROGRESS NOTES
[facility]  PRIMARY CARE PROVIDER AND CLINIC:  Puja Casanova [address] / [place] [address]  Chief Complaint   Patient presents with     Hospital F/U     [facility] Medical Record Number:  [id number]    HPI:    Celeste Hawthorne is a 89 year old  (7/30/1915),admitted to the [facility] from [facility].  Hospital stay 08/13/2018 through 08/15/2018.  Admitted to this facility for  rehab, medical management and nursing care.  HPI information obtained from: facility chart records.  Current issues are:         Pneumonia  Anemia, macrocytic  DVT, lower extremity, proximal, acute, right (H)  Bilateral leg edema  CKD (chronic kidney disease) stage 3, GFR 30-59 ml/min  Aortic valve sclerosis  Chronic pain     Hospital Course     Celeste Hawthorne was admitted on 8/13/2018.  The following problems were addressed during her hospitalization:     Fall   Seems like that she fell out of bed- exact details are unclear, but sounds like she was trying to get up to go to the bathroom and didn't use her walker and fell.  She did hit her head as well. NCHCT in ER without e/o acute hemorrhage (patient on warfarin), and cervical spine without acute fracture      Infiltrates by CXR suspect Pneumonia    Has h/o of some infiltrates on CXR per rads report. Temp 100.3 and WBC of 16 on admission. Procal is elevated to 12.   -- given elevated procal,  treated as PNA  -- started on ceftriaxone and Z-frank in hospital, will discharge on 5 days of Ceftin BID and 3 more days of azithromycin  -- blood cultures remain no growth to date  -- SLP eval and rec mechanical soft diet  -- wbc has normalized and remained afebrile through hospital stay, note she was receiving tylenol for pain     Anemia, macrocytic  No report of melena or BRBPR in hospital. hgb last was around 10 and this admission in 8s.  She also has CKD so could be partly due to that.    -- given that she is on  anticoagulation--started ferrous sulfate and PPI at discharge  -- recommend f/u CBC in 1 week. Recommend checking B12 and folic level then as well      Hx of chronic RLE DVT:  On chronic warfarin-will have pharmacy manage although given age one could make the argument that with risk of falls may not be a candidate for further AC-defer to primary       Htn/le edema:  pta atenolol 25 mg, hydralazine 10 mg tid and bumetanide 1 mg every day-resumed       CKD:  Improved from baseline 1.5-1.7 range.       Harsh MIHIR-suspect aortic sclerosis vs stenosis-unable to locate echo report in chart.       Chronic pain:  Cont with home regimen of gabapentin 300 mg bid and oxycodone 2.5 mg qid and q 8hours prn    --------------------------------------------------    Patient is found sitting up in chair in room with legs up on bed. Reports fatigue, pain to right leg and to bilateral upper chest. Denies SOB, cough, fever. Reports some JACOBSON. Daughter Dorothea is present and wondering about continuing abx and hospital course. Also about ? Increasing O2 orders during time of dyspnea for comfort. Writer discussed hospital course, r/b of continuing warfarin and dx of PNA with patient and daughter Candy.      CODE STATUS/ADVANCE DIRECTIVES DISCUSSION:   DNR / DNI  Patient's living condition: lives in a skilled nursing facility    ALLERGIES:Amoxicillin  PAST MEDICAL HISTORY:  has a past medical history of Anemia; Anemia due to blood loss, acute (5/14/2012); Breast cancer (H) (1981); Chronic respiratory failure with hypoxia (H); Constipation; DVT (deep vein thrombosis) in pregnancy (H); History of blood transfusion; Hypertension; Left shoulder pain; and Macular degeneration.  PAST SURGICAL HISTORY:  has a past surgical history that includes Esophagoscopy (5/11/2012); Breast surgery (1981); Open reduction internal fixation femur distal (5/8/2012); Eye surgery; hemorrhoidectomy; Repair esophageal stricture; and Reverse arthroplasty shoulder (Left,  8/19/2014).  FAMILY HISTORY: family history includes Other Cancer in her son.  SOCIAL HISTORY:  reports that she has never smoked. She has never used smokeless tobacco. She reports that she drinks alcohol. She reports that she does not use illicit drugs.    Post Discharge Medication Reconciliation Status: discharge medications reconciled and changed, per note/orders (see AVS).  Current Outpatient Prescriptions   Medication Sig Dispense Refill     ACETAMINOPHEN PO Take 1,000 mg by mouth 3 times daily        ammonium lactate (LAC-HYDRIN) 12 % lotion Apply topically 2 times daily       atenolol (TENORMIN) 25 MG tablet Take 25 mg by mouth daily.       azithromycin (ZITHROMAX) 250 MG tablet Take 1 tablet (250 mg) by mouth daily for 3 days 3 tablet 0     Bumetanide (BUMEX PO) Take 1 mg by mouth daily        Carboxymethylcellulose Sod PF (REFRESH PLUS) 0.5 % SOLN ophthalmic solution Place 1 drop into both eyes 4 times daily as needed for dry eyes       cefuroxime (CEFTIN) 500 MG tablet Take 1 tablet (500 mg) by mouth 2 times daily for 5 days 10 tablet 0     ferrous sulfate (IRON) 325 (65 Fe) MG tablet Take 1 tablet (325 mg) by mouth daily (with breakfast) 90 tablet 2     gabapentin (NEURONTIN) 100 MG capsule Take 300 mg by mouth 2 times daily        HydrALAZINE HCl (APRESOLINE PO) Take 10 mg by mouth 3 times daily        multivitamin (OCUVITE) TABS Take 1 tablet by mouth daily       oxyCODONE IR (ROXICODONE) 5 MG tablet Take 0.5 tablets (2.5 mg) by mouth 4 times daily and q 8 hours prn 3 tablet 0     pantoprazole (PROTONIX) 40 MG EC tablet Take 1 tablet (40 mg) by mouth daily Take 30-60 minutes before a meal. 30 tablet 1     polyethylene glycol (MIRALAX/GLYCOLAX) packet Take 1 packet by mouth daily       VITAMIN D, CHOLECALCIFEROL, PO Take 1,000 Units by mouth daily       Warfarin Therapy Reminder 1 each continuous prn         ROS:  10 point ROS of systems including Constitutional, Eyes, Respiratory, Cardiovascular,  "Gastroenterology, Genitourinary, Integumentary, Muscularskeletal, Psychiatric were all negative except for pertinent positives noted in my HPI.    Exam:  /78  Pulse 88  Temp 98  F (36.7  C)  Resp 19  Ht 5' 4\" (1.626 m)  Wt 149 lb (67.6 kg)  SpO2 96%  BMI 25.58 kg/m2    GENERAL APPEARANCE: Alert, in no distress   ENT: Mouth and posterior oropharynx normal, moist mucous membranes   EYES: EOM, conjunctivae, lids, pupils and irises normal   NECK: No adenopathy,masses or thyromegaly   RESP: respiratory effort and palpation of chest normal, Lung sounds decreased throughout- wearing O2 per NC 2L  CV: Palpation and auscultation of heart done , regular rate and rhythm, no murmur, rub, or gallop, peripheral edema - 2+ bilateral LE, Ace wraps on  ABDOMEN: normal bowel sounds, soft, nontender, no hepatosplenomegaly or other masses   : palpation of bladder WNL   M/S: Gait and station normal   Digits and nails normal - DAVE  SKIN: Inspection of skin and subcutaneous tissue baseline, Palpation of skin and subcutaneous tissue baseline-   NEURO: Cranial nerves 2-12 are normal tested and grossly at patient's baseline, Examination of sensation by touch normal   PSYCH: oriented X 3, affect and mood normal             BP 08/09-08/16: 110-166/65-90 mmHg    Lab/Diagnostic data:    CBC RESULTS:   Recent Labs   Lab Test  08/15/18   0729 08/14/18   0724   WBC  5.3  12.7*   RBC  2.61*  2.69*   HGB  8.2*  8.5*   HCT  27.8*  28.4*   MCV  107*  106*   MCH  31.4  31.6   MCHC  29.5*  29.9*   RDW  14.0  13.9   PLT  188  173       Last Basic Metabolic Panel:  Recent Labs   Lab Test  08/14/18   0724  08/13/18   2328   NA  142  139   POTASSIUM  4.8  4.4   CHLORIDE  102  98   HEIDY  8.6  8.9   CO2  37*  38*   BUN  28  29   CR  1.25*  1.30*   GLC  97  130*       Liver Function Studies -   Recent Labs   Lab Test  08/14/18   0724  08/13/18   2328   PROTTOTAL  5.5*  5.8*   ALBUMIN  2.5*  2.7*   BILITOTAL  0.5  0.4   ALKPHOS  58  61   AST  13  " 15   ALT  12  13     ASSESSMENT/PLAN:  Pneumonia  - bilateral, WBC and pro calcitonin elevated  - complete antibiotic course as ordered  - O2 - 2-4 LNC, may increase to 4 L for dyspnea   - recheck BMP/CBC  - VS per abx protocol  - follow clinically    Anemia, macrocytic  *- HGB dropping of late  - old old and on warfarin 2/2 mx DVTs- r/b briefly discussed  - Iron and PPI added inpatient  - monitor for s/s bleeding  - repeat CBC Monday    DVT, lower extremity, proximal, acute, right (H)  - recurrent  - has been on warfarin for latest DVT RLE > 6 months- will discuss warfarin cont with MD  - will recheck CBC 8/20- if HGB cont to fall- re-discuss r/b of continuing warfarin wih patient/family  - fr now continue on warfarin with goal INR 1.8- 2.5    Bilateral leg edema  - chronic  - continue on current Bumex and ace wraps/elevation    CKD (chronic kidney disease) stage 3, GFR 30-59 ml/min  - baseline Cr 1.2-1.3- currently baseline  - avoid nephrotoxic medications  - periodic BMP    Aortic valve sclerosis  - noted  - advanced age, comfort care goals    Chronic pain  - recurrent insult to RLE- and now some pain to bilateral upper chest after fall  - continue on sched low dose oxycodone and on gabapentin            Electronically signed by:  ANN MARIE Blunt CNP

## 2018-08-17 NOTE — LETTER
8/17/2018        RE: Celeste Hawthorne  [facility] Of [facility]  [address] [facility] [place] [address]        [facility]  PRIMARY CARE PROVIDER AND CLINIC:  Puja Casanova [address] [address] / [place] [address]  Chief Complaint   Patient presents with     Hospital F/U     [place] Medical Record Number:  [id number]    HPI:    Celeste Hawthorne is a 89 year old  (7/30/1915),admitted to the [facility] from [facility].  Hospital stay 08/13/2018 through 08/15/2018.  Admitted to this facility for  rehab, medical management and nursing care.  HPI information obtained from: facility chart records.  Current issues are:         Pneumonia  Anemia, macrocytic  DVT, lower extremity, proximal, acute, right (H)  Bilateral leg edema  CKD (chronic kidney disease) stage 3, GFR 30-59 ml/min  Aortic valve sclerosis  Chronic pain     Hospital Course     Celeste Hawthorne was admitted on 8/13/2018.  The following problems were addressed during her hospitalization:     Fall   Seems like that she fell out of bed- exact details are unclear, but sounds like she was trying to get up to go to the bathroom and didn't use her walker and fell.  She did hit her head as well. NCHCT in ER without e/o acute hemorrhage (patient on warfarin), and cervical spine without acute fracture      Infiltrates by CXR suspect Pneumonia    Has h/o of some infiltrates on CXR per rads report. Temp 100.3 and WBC of 16 on admission. Procal is elevated to 12.   -- given elevated procal,  treated as PNA  -- started on ceftriaxone and Z-frank in hospital, will discharge on 5 days of Ceftin BID and 3 more days of azithromycin  -- blood cultures remain no growth to date  -- SLP eval and rec mechanical soft diet  -- wbc has normalized and remained afebrile through hospital stay, note she was receiving tylenol for pain     Anemia, macrocytic  No report of melena or BRBPR in hospital. hgb last was  around 10 and this admission in 8s.  She also has CKD so could be partly due to that.    -- given that she is on anticoagulation--started ferrous sulfate and PPI at discharge  -- recommend f/u CBC in 1 week. Recommend checking B12 and folic level then as well      Hx of chronic RLE DVT:  On chronic warfarin-will have pharmacy manage although given age one could make the argument that with risk of falls may not be a candidate for further AC-defer to primary       Htn/le edema:  pta atenolol 25 mg, hydralazine 10 mg tid and bumetanide 1 mg every day-resumed       CKD:  Improved from baseline 1.5-1.7 range.       Harsh MIHIR-suspect aortic sclerosis vs stenosis-unable to locate echo report in chart.       Chronic pain:  Cont with home regimen of gabapentin 300 mg bid and oxycodone 2.5 mg qid and q 8hours prn    --------------------------------------------------    Patient is found sitting up in chair in room with legs up on bed. Reports fatigue, pain to right leg and to bilateral upper chest. Denies SOB, cough, fever. Reports some JACOBSON. Daughter Dorothea is present and wondering about continuing abx and hospital course. Also about ? Increasing O2 orders during time of dyspnea for comfort. Writer discussed hospital course, r/b of continuing warfarin and dx of PNA with patient and daughter Candy.      CODE STATUS/ADVANCE DIRECTIVES DISCUSSION:   DNR / DNI  Patient's living condition: lives in a skilled nursing facility    ALLERGIES:Amoxicillin  PAST MEDICAL HISTORY:  has a past medical history of Anemia; Anemia due to blood loss, acute (5/14/2012); Breast cancer (H) (1981); Chronic respiratory failure with hypoxia (H); Constipation; DVT (deep vein thrombosis) in pregnancy (H); History of blood transfusion; Hypertension; Left shoulder pain; and Macular degeneration.  PAST SURGICAL HISTORY:  has a past surgical history that includes Esophagoscopy (5/11/2012); Breast surgery (1981); Open reduction internal fixation femur distal  (5/8/2012); Eye surgery; hemorrhoidectomy; Repair esophageal stricture; and Reverse arthroplasty shoulder (Left, 8/19/2014).  FAMILY HISTORY: family history includes Other Cancer in her son.  SOCIAL HISTORY:  reports that she has never smoked. She has never used smokeless tobacco. She reports that she drinks alcohol. She reports that she does not use illicit drugs.    Post Discharge Medication Reconciliation Status: discharge medications reconciled and changed, per note/orders (see AVS).  Current Outpatient Prescriptions   Medication Sig Dispense Refill     ACETAMINOPHEN PO Take 1,000 mg by mouth 3 times daily        ammonium lactate (LAC-HYDRIN) 12 % lotion Apply topically 2 times daily       atenolol (TENORMIN) 25 MG tablet Take 25 mg by mouth daily.       azithromycin (ZITHROMAX) 250 MG tablet Take 1 tablet (250 mg) by mouth daily for 3 days 3 tablet 0     Bumetanide (BUMEX PO) Take 1 mg by mouth daily        Carboxymethylcellulose Sod PF (REFRESH PLUS) 0.5 % SOLN ophthalmic solution Place 1 drop into both eyes 4 times daily as needed for dry eyes       cefuroxime (CEFTIN) 500 MG tablet Take 1 tablet (500 mg) by mouth 2 times daily for 5 days 10 tablet 0     ferrous sulfate (IRON) 325 (65 Fe) MG tablet Take 1 tablet (325 mg) by mouth daily (with breakfast) 90 tablet 2     gabapentin (NEURONTIN) 100 MG capsule Take 300 mg by mouth 2 times daily        HydrALAZINE HCl (APRESOLINE PO) Take 10 mg by mouth 3 times daily        multivitamin (OCUVITE) TABS Take 1 tablet by mouth daily       oxyCODONE IR (ROXICODONE) 5 MG tablet Take 0.5 tablets (2.5 mg) by mouth 4 times daily and q 8 hours prn 3 tablet 0     pantoprazole (PROTONIX) 40 MG EC tablet Take 1 tablet (40 mg) by mouth daily Take 30-60 minutes before a meal. 30 tablet 1     polyethylene glycol (MIRALAX/GLYCOLAX) packet Take 1 packet by mouth daily       VITAMIN D, CHOLECALCIFEROL, PO Take 1,000 Units by mouth daily       Warfarin Therapy Reminder 1 each  "continuous prn         ROS:  10 point ROS of systems including Constitutional, Eyes, Respiratory, Cardiovascular, Gastroenterology, Genitourinary, Integumentary, Muscularskeletal, Psychiatric were all negative except for pertinent positives noted in my HPI.    Exam:  /78  Pulse 88  Temp 98  F (36.7  C)  Resp 19  Ht 5' 4\" (1.626 m)  Wt 149 lb (67.6 kg)  SpO2 96%  BMI 25.58 kg/m2    GENERAL APPEARANCE: Alert, in no distress   ENT: Mouth and posterior oropharynx normal, moist mucous membranes   EYES: EOM, conjunctivae, lids, pupils and irises normal   NECK: No adenopathy,masses or thyromegaly   RESP: respiratory effort and palpation of chest normal, Lung sounds decreased throughout- wearing O2 per NC 2L  CV: Palpation and auscultation of heart done , regular rate and rhythm, no murmur, rub, or gallop, peripheral edema - 2+ bilateral LE, Ace wraps on  ABDOMEN: normal bowel sounds, soft, nontender, no hepatosplenomegaly or other masses   : palpation of bladder WNL   M/S: Gait and station normal   Digits and nails normal - DAVE  SKIN: Inspection of skin and subcutaneous tissue baseline, Palpation of skin and subcutaneous tissue baseline-   NEURO: Cranial nerves 2-12 are normal tested and grossly at patient's baseline, Examination of sensation by touch normal   PSYCH: oriented X 3, affect and mood normal             BP 08/09-08/16: 110-166/65-90 mmHg    Lab/Diagnostic data:    CBC RESULTS:   Recent Labs   Lab Test  08/15/18   0729 08/14/18   0724   WBC  5.3  12.7*   RBC  2.61*  2.69*   HGB  8.2*  8.5*   HCT  27.8*  28.4*   MCV  107*  106*   MCH  31.4  31.6   MCHC  29.5*  29.9*   RDW  14.0  13.9   PLT  188  173       Last Basic Metabolic Panel:  Recent Labs   Lab Test  08/14/18 0724 08/13/18   2328   NA  142  139   POTASSIUM  4.8  4.4   CHLORIDE  102  98   HEIDY  8.6  8.9   CO2  37*  38*   BUN  28  29   CR  1.25*  1.30*   GLC  97  130*       Liver Function Studies -   Recent Labs   Lab Test  08/14/18   0724  " 08/13/18   2328   PROTTOTAL  5.5*  5.8*   ALBUMIN  2.5*  2.7*   BILITOTAL  0.5  0.4   ALKPHOS  58  61   AST  13  15   ALT  12  13     ASSESSMENT/PLAN:  Pneumonia  - bilateral, WBC and pro calcitonin elevated  - complete antibiotic course as ordered  - O2 - 2-4 LNC, may increase to 4 L for dyspnea   - recheck BMP/CBC  - VS per abx protocol  - follow clinically    Anemia, macrocytic  *- HGB dropping of late  - old old and on warfarin 2/2 mx DVTs- r/b briefly discussed  - Iron and PPI added inpatient  - monitor for s/s bleeding  - repeat CBC Monday    DVT, lower extremity, proximal, acute, right (H)  - recurrent  - has been on warfarin for latest DVT RLE > 6 months- will discuss warfarin cont with MD  - will recheck CBC 8/20- if HGB cont to fall- re-discuss r/b of continuing warfarin wi patient/family  - fr now continue on warfarin with goal INR 1.8- 2.5    Bilateral leg edema  - chronic  - continue on current Bumex and ace wraps/elevation    CKD (chronic kidney disease) stage 3, GFR 30-59 ml/min  - baseline Cr 1.2-1.3- currently baseline  - avoid nephrotoxic medications  - periodic BMP    Aortic valve sclerosis  - noted  - advanced age, comfort care goals    Chronic pain  - recurrent insult to RLE- and now some pain to bilateral upper chest after fall  - continue on sched low dose oxycodone and on gabapentin            Electronically signed by:  ANN MARIE Blunt CNP                    Sincerely,        ANN MARIE Blunt CNP

## 2018-08-24 NOTE — LETTER
"    2018        RE: Celeste Hawthorne  [facility]  [address] [facility] [address]        Celeste Hawthorne is a 89 year old female seen 2018 at [facility] where she has resided for almost 2 years (admit 10/2016) seen to follow up recent hospitalization for pneumonia with infiltrates on CXR, elevated WBC and procalcitonin, and fever.   She was treated with abx and seen by SLP.   Improved and returned to LTC.    Patient is seen in her room today, up to .   Reports she is \"tired all the time,\" has more shortness of breath, and her left shoulder hurts when she coughs.     She has not had recurrent fever, by report.     By chart review, respiratory symptoms and cough go back a year or more; she has pulmonary HTN with diastolic CHF, valvular heart abnormalities and peripheral edema.        Past Medical History:   Diagnosis Date     Anemia      Anemia due to blood loss, acute 2012     Breast cancer (H) 1981    right breast     Chronic respiratory failure with hypoxia (H)     On O2 2L/nc     Congestive heart failure (H)      Constipation      DVT (deep vein thrombosis) in pregnancy (H)      History of blood transfusion      Hypertension      Left shoulder pain      Macular degeneration      Pulmonary hypertension       SH: , she has 3 daughters.     Had one son who  of cancer.     ROS: limited, but essentially negative other than above.  BIMS 9/15       EXAM:  NAD  /80  Pulse 70  Temp 98  F (36.7  C)  Resp 16  Ht 5' 4\" (1.626 m)  Wt 143 lb 12.8 oz (65.2 kg)  SpO2 92%  BMI 24.68 kg/m2   Neck supple without adenopthy  Lungs with markedly decreased BS, coarse crackles bilaterally   Ht RRR s1s2 with occ ectopy, 2/6 MIHIR  Abd soft, NT, no distention, +BS  Ext with trace edema, wearing LE wraps  Incisional scar on left shoulder.     Neuro: very limited history, generalized weakness  Psych: affect okay.     Last Comprehensive Metabolic Panel:  Sodium "   Date Value Ref Range Status   08/20/2018 146 (H) 133 - 144 mmol/L Final     Potassium   Date Value Ref Range Status   08/20/2018 4.0 3.4 - 5.3 mmol/L Final     Chloride   Date Value Ref Range Status   08/20/2018 103 94 - 109 mmol/L Final     Carbon Dioxide   Date Value Ref Range Status   08/20/2018 41 (H) 20 - 32 mmol/L Final     Anion Gap   Date Value Ref Range Status   08/20/2018 2 (L) 3 - 14 mmol/L Final     Glucose   Date Value Ref Range Status   08/20/2018 76 70 - 99 mg/dL Final     Urea Nitrogen   Date Value Ref Range Status   08/20/2018 20 7 - 30 mg/dL Final     Creatinine   Date Value Ref Range Status   08/20/2018 1.09 (H) 0.52 - 1.04 mg/dL Final     GFR Estimate   Date Value Ref Range Status   08/20/2018 46 (L) >60 mL/min/1.7m2 Final     Comment:     Non  GFR Calc     Calcium   Date Value Ref Range Status   08/20/2018 8.5 8.5 - 10.1 mg/dL Final     Lab Results   Component Value Date    WBC 4.1 08/22/2018      HGB 9.0 08/22/2018       08/22/2018       08/22/2018       IMP/PLAN:   (J18.9) Pneumonia of both lower lobes due to infectious organism  (primary encounter diagnosis)  Comment: has finished course of abx, but persistent symptoms      Plan: recheck CXR and CBC, may need further tx; reviewed that option with patient today, but she would like to wait a couple of days and see if she feels better.        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    Monitor for hypotension.       (I82.4Y1) DVT, lower extremity, proximal, acute, right (H)  Comment: recurrent   Plan: warfarin per INR until goals of care change       (N18.3) CKD (chronic kidney disease) stage 3, GFR 30-59 ml/min  Comment: at baseline    Plan: follow BMP    (F03.90) Dementia without behavioral  disturbance, unspecified dementia type  (R54) Frailty  Comment: late onset, low functional status  Plan: LTC support for meds, meals, activity    (M25.512) Left shoulder pain, unspecified chronicity  Comment: h/o prior procedure.     Plan: local measures, continue pain regimen        (D53.9) Macrocytic anemia  Comment: PPI and Fe started during hospitalization   Plan: would look to discontinue those now, check B12 level.         Mitzy Stanton MD       Sincerely,        Mitzy Stanton MD

## 2018-08-28 PROBLEM — I27.0 PRIMARY PULMONARY HYPERTENSION (H): Status: ACTIVE | Noted: 2018-01-01

## 2018-08-28 PROBLEM — I50.43 ACUTE ON CHRONIC COMBINED SYSTOLIC AND DIASTOLIC CONGESTIVE HEART FAILURE (H): Status: ACTIVE | Noted: 2018-01-01

## 2018-08-28 NOTE — LETTER
8/28/2018        RE: Celeste Hawthorne  [facility]  [address] [facility] [address]        [facility] [facility]    Chief Complaint   Patient presents with     INR RESULTS     Increased SOB       [facility] Medical Record Number:  [id number]    HPI:    Celeste Hawthorne is a 89 year old  (7/30/1915), who is being seen today for an episodic care visit at [facility].  HPI information obtained from: facility chart records, facility staff, patient report, [facility] chart review and family/first contact Candy report.    Today's concern is:  Acute on chronic combined systolic and diastolic congestive heart failure (H)  Primary pulmonary hypertension (H)  Patient is having increase shortness of breath. She is complaining of breathing changes and a cough. Her table mates report she is coughing with eating. Daughter reports that her breathing changes quickly. Patient denies chills or fevers. Patient's daughter expressed that since recent hospitalization 8/13-8/15 patient has not been her self. She reports her breathing has not been back to baseline and she is noticing increased confusion. Patient and daughter are open to trying antibiotics. Patient does not want a chest xray unless it will change the plan. Her weight is up 2 pounds over the last 2-3 days.     Pneumonia of both lower lobes due to infectious organism  Patient was treated with ceftin and zpac during last episode with pneumonia. She has only been off antibiotics for 10 days, but has been having increased SOB over the last 2-3 days.    Encounter for therapeutic drug monitoring  Long-term (current) use of anticoagulants  Personal history of DVT (deep vein thrombosis)  Patient was seen today for a new INR result of 2.1. Daughter Candy was contacted about DVT and the use of coumadin, the risk of bleeding with antibiotic use and increased age. We agreed stop coumadin at this time. She would like to discuss options should she  "develop another DVT.       ALLERGIES: Amoxicillin  Past Medical, Surgical, Family and Social History reviewed and updated in [facility].    Current Outpatient Prescriptions   Medication Sig Dispense Refill     ACETAMINOPHEN PO Take 1,000 mg by mouth 3 times daily        ammonium lactate (LAC-HYDRIN) 12 % lotion Apply topically 2 times daily       atenolol (TENORMIN) 25 MG tablet Take 25 mg by mouth daily.       Bumetanide (BUMEX PO) Take 1 mg by mouth daily        Carboxymethylcellulose Sod PF (REFRESH PLUS) 0.5 % SOLN ophthalmic solution Place 1 drop into both eyes 4 times daily as needed for dry eyes       ferrous sulfate (IRON) 325 (65 Fe) MG tablet Take 1 tablet (325 mg) by mouth daily (with breakfast) 90 tablet 2     gabapentin (NEURONTIN) 100 MG capsule Take 300 mg by mouth 2 times daily        HydrALAZINE HCl (APRESOLINE PO) Take 10 mg by mouth 3 times daily        multivitamin (OCUVITE) TABS Take 1 tablet by mouth daily       oxyCODONE IR (ROXICODONE) 5 MG tablet Take 0.5 tablets (2.5 mg) by mouth 4 times daily and q 8 hours prn 3 tablet 0     pantoprazole (PROTONIX) 40 MG EC tablet Take 1 tablet (40 mg) by mouth daily Take 30-60 minutes before a meal. 30 tablet 1     polyethylene glycol (MIRALAX/GLYCOLAX) packet Take 1 packet by mouth daily       trolamine salicylate (ASPERCREME) 10 % cream Apply topically 3 times daily ; Apply to bilateral shoulders for shoulder pain. Massage 2\" on each shoulder       VITAMIN D, CHOLECALCIFEROL, PO Take 1,000 Units by mouth daily       Warfarin Therapy Reminder 1 each continuous prn       Medications reviewed:  Medications reconciled to facility chart and changes were made to reflect current medications as identified as above med list. Below are the changes that were made:   Medications stopped since last EPIC medication reconciliation:   There are no discontinued medications.    Medications started since last [facility] medication reconciliation:  Orders Placed This Encounter " "  Medications     trolamine salicylate (ASPERCREME) 10 % cream     Sig: Apply topically 3 times daily ; Apply to bilateral shoulders for shoulder pain. Massage 2\" on each shoulder       REVIEW OF SYSTEMS:  Limited secondary to cognitive impairment but today pt reports increased shortness of breath and a cough.    Physical Exam:  /70  Pulse 84  Temp 98  F (36.7  C)  Resp 16  Ht 5' 4\" (1.626 m)  Wt 141 lb 6.4 oz (64.1 kg)  SpO2 93%  BMI 24.27 kg/m2  GENERAL APPEARANCE:  Alert, in no distress, cooperative  ENT:  Mouth and posterior oropharynx normal, moist mucous membranes, normal hearing acuity  EYES:  EOM, conjunctivae, lids, pupils and irises normal  RESP:  respiratory effort and palpation of chest normal, no respiratory distress, rhonchi in bilateral lower bases, expiratory wheezes, no stridor  CV:  Palpation and auscultation of heart done , regular rate and rhythm, no murmur, rub, or gallop, peripheral edema 1+ in bilateral lower extremities  ABDOMEN:  normal bowel sounds, soft, nontender, no hepatosplenomegaly or other masses  M/S:   Gait and station abnormal wheelchair bound  PSYCH:  oriented to self, memory impaired , affect and mood normal    Recent Labs:     CBC RESULTS:   Recent Labs   Lab Test  08/22/18   0517  08/20/18   0605   WBC  4.1  4.9   RBC  2.96*  2.81*   HGB  9.0*  8.8*   HCT  30.4*  29.2*   MCV  103*  104*   MCH  30.4  31.3   MCHC  29.6*  30.1*   RDW  14.0  14.0   PLT  212  201       Last Basic Metabolic Panel:  Recent Labs   Lab Test  08/20/18   0605  08/14/18   0724   NA  146*  142   POTASSIUM  4.0  4.8   CHLORIDE  103  102   HEIDY  8.5  8.6   CO2  41*  37*   BUN  20  28   CR  1.09*  1.25*   GLC  76  97       Liver Function Studies -   Recent Labs   Lab Test  08/14/18   0724  08/13/18   2328   PROTTOTAL  5.5*  5.8*   ALBUMIN  2.5*  2.7*   BILITOTAL  0.5  0.4   ALKPHOS  58  61   AST  13  15   ALT  12  13       Assessment/Plan:  (I50.43) Acute on chronic combined systolic and diastolic " congestive heart failure (H)  (primary encounter diagnosis)  (I27.0) Primary pulmonary hypertension (H)  Comment: acute on chronic  Plan: Will increase Bumex to 1 mg BID for 5 days, recheck labs next week on 9/5/18    (J18.9) Pneumonia of both lower lobes due to infectious organism  Comment: Acute  Plan: Due to amoxicillin allergy as well as a recent pneumonia treated with other antibiotics and now another pneumonia, Will start Levaquin 250 mg for 10 days (renal dosage due to creatine clearance) and duonebs and CBC 9/5/18    (Z51.81) Encounter for therapeutic drug monitoring  (Z79.01) Long-term (current) use of anticoagulants  (Z86.718) Personal history of DVT (deep vein thrombosis)  Comment: chronic  Plan: Will discontinue coumadin. No need for further rechecks. Daughter Candy in agreement with plan of care.    Total time spent with patient visit at the [facility] nursing [facility] was 35 min including patient visit, review of past records and phone call to patient contact. Greater than 50% of total time spent with counseling and coordinating care due to increasing care needs, multiple medical conditions    Rayne Marks RN NP intern  This patient was seen along with a nurse practitioner intern, Rayne Marks.  The histories and reviews of systems were obtained by her and confirmed by myself. All objective, assessments and plans were completed by myself.      Electronically signed by  ANN MARIE Forbes CNP                      Sincerely,        ANN MARIE Forbes CNP

## 2018-08-28 NOTE — PROGRESS NOTES
[place] [facility]    Chief Complaint   Patient presents with     INR RESULTS     Increased SOB       [place] Medical Record Number:  [id number]    HPI:    Celeste Hawthorne is a 89 year old  (7/30/1915), who is being seen today for an episodic care visit at [facility].  HPI information obtained from: facility chart records, facility staff, patient report, [facility] chart review and family/first contact Candy report.    Today's concern is:  Acute on chronic combined systolic and diastolic congestive heart failure (H)  Primary pulmonary hypertension (H)  Patient is having increase shortness of breath. She is complaining of breathing changes and a cough. Her table mates report she is coughing with eating. Daughter reports that her breathing changes quickly. Patient denies chills or fevers. Patient's daughter expressed that since recent hospitalization 8/13-8/15 patient has not been her self. She reports her breathing has not been back to baseline and she is noticing increased confusion. Patient and daughter are open to trying antibiotics. Patient does not want a chest xray unless it will change the plan. Her weight is up 2 pounds over the last 2-3 days.     Pneumonia of both lower lobes due to infectious organism  Patient was treated with ceftin and zpac during last episode with pneumonia. She has only been off antibiotics for 10 days, but has been having increased SOB over the last 2-3 days.    Encounter for therapeutic drug monitoring  Long-term (current) use of anticoagulants  Personal history of DVT (deep vein thrombosis)  Patient was seen today for a new INR result of 2.1. Daughter Candy was contacted about DVT and the use of coumadin, the risk of bleeding with antibiotic use and increased age. We agreed stop coumadin at this time. She would like to discuss options should she develop another DVT.       ALLERGIES: Amoxicillin  Past Medical, Surgical, Family and Social History reviewed and  "updated in [facility].    Current Outpatient Prescriptions   Medication Sig Dispense Refill     ACETAMINOPHEN PO Take 1,000 mg by mouth 3 times daily        ammonium lactate (LAC-HYDRIN) 12 % lotion Apply topically 2 times daily       atenolol (TENORMIN) 25 MG tablet Take 25 mg by mouth daily.       Bumetanide (BUMEX PO) Take 1 mg by mouth daily        Carboxymethylcellulose Sod PF (REFRESH PLUS) 0.5 % SOLN ophthalmic solution Place 1 drop into both eyes 4 times daily as needed for dry eyes       ferrous sulfate (IRON) 325 (65 Fe) MG tablet Take 1 tablet (325 mg) by mouth daily (with breakfast) 90 tablet 2     gabapentin (NEURONTIN) 100 MG capsule Take 300 mg by mouth 2 times daily        HydrALAZINE HCl (APRESOLINE PO) Take 10 mg by mouth 3 times daily        multivitamin (OCUVITE) TABS Take 1 tablet by mouth daily       oxyCODONE IR (ROXICODONE) 5 MG tablet Take 0.5 tablets (2.5 mg) by mouth 4 times daily and q 8 hours prn 3 tablet 0     pantoprazole (PROTONIX) 40 MG EC tablet Take 1 tablet (40 mg) by mouth daily Take 30-60 minutes before a meal. 30 tablet 1     polyethylene glycol (MIRALAX/GLYCOLAX) packet Take 1 packet by mouth daily       trolamine salicylate (ASPERCREME) 10 % cream Apply topically 3 times daily ; Apply to bilateral shoulders for shoulder pain. Massage 2\" on each shoulder       VITAMIN D, CHOLECALCIFEROL, PO Take 1,000 Units by mouth daily       Warfarin Therapy Reminder 1 each continuous prn       Medications reviewed:  Medications reconciled to facility chart and changes were made to reflect current medications as identified as above med list. Below are the changes that were made:   Medications stopped since last EPIC medication reconciliation:   There are no discontinued medications.    Medications started since last [facility] medication reconciliation:  Orders Placed This Encounter   Medications     trolamine salicylate (ASPERCREME) 10 % cream     Sig: Apply topically 3 times daily ; Apply to " "bilateral shoulders for shoulder pain. Massage 2\" on each shoulder       REVIEW OF SYSTEMS:  Limited secondary to cognitive impairment but today pt reports increased shortness of breath and a cough.    Physical Exam:  /70  Pulse 84  Temp 98  F (36.7  C)  Resp 16  Ht 5' 4\" (1.626 m)  Wt 141 lb 6.4 oz (64.1 kg)  SpO2 93%  BMI 24.27 kg/m2  GENERAL APPEARANCE:  Alert, in no distress, cooperative  ENT:  Mouth and posterior oropharynx normal, moist mucous membranes, normal hearing acuity  EYES:  EOM, conjunctivae, lids, pupils and irises normal  RESP:  respiratory effort and palpation of chest normal, no respiratory distress, rhonchi in bilateral lower bases, expiratory wheezes, no stridor  CV:  Palpation and auscultation of heart done , regular rate and rhythm, no murmur, rub, or gallop, peripheral edema 1+ in bilateral lower extremities  ABDOMEN:  normal bowel sounds, soft, nontender, no hepatosplenomegaly or other masses  M/S:   Gait and station abnormal wheelchair bound  PSYCH:  oriented to self, memory impaired , affect and mood normal    Recent Labs:     CBC RESULTS:   Recent Labs   Lab Test  08/22/18   0517  08/20/18   0605   WBC  4.1  4.9   RBC  2.96*  2.81*   HGB  9.0*  8.8*   HCT  30.4*  29.2*   MCV  103*  104*   MCH  30.4  31.3   MCHC  29.6*  30.1*   RDW  14.0  14.0   PLT  212  201       Last Basic Metabolic Panel:  Recent Labs   Lab Test  08/20/18   0605  08/14/18   0724   NA  146*  142   POTASSIUM  4.0  4.8   CHLORIDE  103  102   HEIDY  8.5  8.6   CO2  41*  37*   BUN  20  28   CR  1.09*  1.25*   GLC  76  97       Liver Function Studies -   Recent Labs   Lab Test  08/14/18   0724  08/13/18   2328   PROTTOTAL  5.5*  5.8*   ALBUMIN  2.5*  2.7*   BILITOTAL  0.5  0.4   ALKPHOS  58  61   AST  13  15   ALT  12  13       Assessment/Plan:  (I50.43) Acute on chronic combined systolic and diastolic congestive heart failure (H)  (primary encounter diagnosis)  (I27.0) Primary pulmonary hypertension " (H)  Comment: acute on chronic  Plan: Will increase Bumex to 1 mg BID for 5 days, recheck labs next week on 9/5/18    (J18.9) Pneumonia of both lower lobes due to infectious organism  Comment: Acute  Plan: Due to amoxicillin allergy as well as a recent pneumonia treated with other antibiotics and now another pneumonia, Will start Levaquin 250 mg for 10 days (renal dosage due to creatine clearance) and duonebs and CBC 9/5/18    (Z51.81) Encounter for therapeutic drug monitoring  (Z79.01) Long-term (current) use of anticoagulants  (Z86.718) Personal history of DVT (deep vein thrombosis)  Comment: chronic  Plan: Will discontinue coumadin. No need for further rechecks. Daughter Candy in agreement with plan of care.    Total time spent with patient visit at the skilled nursing facility was 35 min including patient visit, review of past records and phone call to patient contact. Greater than 50% of total time spent with counseling and coordinating care due to increasing care needs, multiple medical conditions    Rayne Marks RN NP intern  This patient was seen along with a nurse practitioner intern, Rayne Marks.  The histories and reviews of systems were obtained by her and confirmed by myself. All objective, assessments and plans were completed by myself.      Electronically signed by  ANN MARIE Forbes CNP

## 2018-09-02 NOTE — PROGRESS NOTES
"Celeste Hawthorne is a 89 year old female seen 2018 at [facility] where she has resided for almost 2 years (admit 10/2016) seen to follow up recent hospitalization for pneumonia with infiltrates on CXR, elevated WBC and procalcitonin, and fever.   She was treated with abx and seen by SLP.   Improved and returned to LTC.    Patient is seen in her room today, up to .   Reports she is \"tired all the time,\" has more shortness of breath, and her left shoulder hurts when she coughs.     She has not had recurrent fever, by report.     By chart review, respiratory symptoms and cough go back a year or more; she has pulmonary HTN with diastolic CHF, valvular heart abnormalities and peripheral edema.        Past Medical History:   Diagnosis Date     Anemia      Anemia due to blood loss, acute 2012     Breast cancer (H) 1981    right breast     Chronic respiratory failure with hypoxia (H)     On O2 2L/nc     Congestive heart failure (H)      Constipation      DVT (deep vein thrombosis) in pregnancy (H)      History of blood transfusion      Hypertension      Left shoulder pain      Macular degeneration      Pulmonary hypertension       SH: , she has 3 daughters.     Had one son who  of cancer.     ROS: limited, but essentially negative other than above.  BIMS 9/15       EXAM:  NAD  /80  Pulse 70  Temp 98  F (36.7  C)  Resp 16  Ht 5' 4\" (1.626 m)  Wt 143 lb 12.8 oz (65.2 kg)  SpO2 92%  BMI 24.68 kg/m2   Neck supple without adenopthy  Lungs with markedly decreased BS, coarse crackles bilaterally   Ht RRR s1s2 with occ ectopy, 2/6 MIHIR  Abd soft, NT, no distention, +BS  Ext with trace edema, wearing LE wraps  Incisional scar on left shoulder.     Neuro: very limited history, generalized weakness  Psych: affect okay.     Last Comprehensive Metabolic Panel:  Sodium   Date Value Ref Range Status   2018 146 (H) 133 - 144 mmol/L Final     Potassium   Date Value Ref Range " Status   08/20/2018 4.0 3.4 - 5.3 mmol/L Final     Chloride   Date Value Ref Range Status   08/20/2018 103 94 - 109 mmol/L Final     Carbon Dioxide   Date Value Ref Range Status   08/20/2018 41 (H) 20 - 32 mmol/L Final     Anion Gap   Date Value Ref Range Status   08/20/2018 2 (L) 3 - 14 mmol/L Final     Glucose   Date Value Ref Range Status   08/20/2018 76 70 - 99 mg/dL Final     Urea Nitrogen   Date Value Ref Range Status   08/20/2018 20 7 - 30 mg/dL Final     Creatinine   Date Value Ref Range Status   08/20/2018 1.09 (H) 0.52 - 1.04 mg/dL Final     GFR Estimate   Date Value Ref Range Status   08/20/2018 46 (L) >60 mL/min/1.7m2 Final     Comment:     Non  GFR Calc     Calcium   Date Value Ref Range Status   08/20/2018 8.5 8.5 - 10.1 mg/dL Final     Lab Results   Component Value Date    WBC 4.1 08/22/2018      HGB 9.0 08/22/2018       08/22/2018       08/22/2018       IMP/PLAN:   (J18.9) Pneumonia of both lower lobes due to infectious organism  (primary encounter diagnosis)  Comment: has finished course of abx, but persistent symptoms      Plan: recheck CXR and CBC, may need further tx; reviewed that option with patient today, but she would like to wait a couple of days and see if she feels better.        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    Monitor for hypotension.       (I82.4Y1) DVT, lower extremity, proximal, acute, right (H)  Comment: recurrent   Plan: warfarin per INR until goals of care change       (N18.3) CKD (chronic kidney disease) stage 3, GFR 30-59 ml/min  Comment: at baseline    Plan: follow BMP    (F03.90) Dementia without behavioral disturbance, unspecified dementia type  (R54) Frailty  Comment: late onset, low functional status  Plan: LTC support for  meds, meals, activity    (M25.512) Left shoulder pain, unspecified chronicity  Comment: h/o prior procedure.     Plan: local measures, continue pain regimen        (D53.9) Macrocytic anemia  Comment: PPI and Fe started during hospitalization   Plan: would look to discontinue those now, check B12 level.         Mitzy Stanton MD

## 2018-09-14 NOTE — PROGRESS NOTES
[place] [facility]    Chief Complaint   Patient presents with     POONAMMORAIMA       [place] Medical Record Number:  [id number]    HPI:    Celeste Hawthorne is a 89 year old  (7/30/1915), who is being seen today for an episodic care visit at [facility].  HPI information obtained from: facility chart records, facility staff, patient report and [facility] chart review.Today's concern is:  Acute on chronic combined systolic and diastolic congestive heart failure (H)  Primary pulmonary hypertension (H)  Patient was seen today to follow up on recent pneumonia. She denies shortness of breath or cough. She denies increase in swelling in her lower extremities. Her weight has been stable. She remains on bumex, atenolol, hydralazine. Her blood pressures are within normal limits for her age. Her heart rate is controlled.     CKD (chronic kidney disease) stage 3, GFR 30-59 ml/min  Recent pneumonia and increase in bumex, her kidney function was on the rise. Likely too dry.        BP 09/06-09/13: 109-168/60-98 mmHg    ALLERGIES: Amoxicillin  Past Medical, Surgical, Family and Social History reviewed and updated in [facility].    Current Outpatient Prescriptions   Medication Sig Dispense Refill     ACETAMINOPHEN PO Take 1,000 mg by mouth 3 times daily        ammonium lactate (LAC-HYDRIN) 12 % lotion Apply topically 2 times daily       atenolol (TENORMIN) 25 MG tablet Take 25 mg by mouth daily.       Bumetanide (BUMEX PO) Take 1 mg by mouth daily        Carboxymethylcellulose Sod PF (REFRESH PLUS) 0.5 % SOLN ophthalmic solution Place 1 drop into both eyes 3 times daily        ferrous sulfate (IRON) 325 (65 Fe) MG tablet Take 1 tablet (325 mg) by mouth daily (with breakfast) 90 tablet 2     gabapentin (NEURONTIN) 100 MG capsule Take 300 mg by mouth 2 times daily        HydrALAZINE HCl (APRESOLINE PO) Take 10 mg by mouth 3 times daily        multivitamin (OCUVITE) TABS Take 1 tablet by mouth daily       oxyCODONE  "IR (ROXICODONE) 5 MG tablet Take 0.5 tablets (2.5 mg) by mouth 4 times daily and q 8 hours prn 3 tablet 0     pantoprazole (PROTONIX) 40 MG EC tablet Take 1 tablet (40 mg) by mouth daily Take 30-60 minutes before a meal. 30 tablet 1     polyethylene glycol (MIRALAX/GLYCOLAX) packet Take 1 packet by mouth daily       trolamine salicylate (ASPERCREME) 10 % cream Apply topically 3 times daily ; Apply to bilateral shoulders for shoulder pain. Massage 2\" on each shoulder       VITAMIN D, CHOLECALCIFEROL, PO Take 1,000 Units by mouth daily       Warfarin Therapy Reminder 1 each continuous prn       Medications reviewed:  Medications reconciled to facility chart and changes were made to reflect current medications as identified as above med list. Below are the changes that were made:   Medications stopped since last EPIC medication reconciliation:   There are no discontinued medications.    Medications started since last [facility] medication reconciliation:  No orders of the defined types were placed in this encounter.      REVIEW OF SYSTEMS:  10 point ROS of systems including Constitutional, Eyes, Respiratory, Cardiovascular, Gastroenterology, Genitourinary, Integumentary, Muscularskeletal, Psychiatric were all negative except for pertinent positives noted in my HPI.    Physical Exam:  /78  Pulse 84  Temp 98  F (36.7  C)  Resp 18  Ht 5' 4\" (1.626 m)  Wt 141 lb 3.2 oz (64 kg)  SpO2 96%  BMI 24.24 kg/m2  GENERAL APPEARANCE:  Alert, in no distress, cooperative  EYES:  EOM normal, conjunctiva and lids normal  RESP:  respiratory effort and palpation of chest normal, lungs clear to auscultation , no respiratory distress, diminished breath sounds bilateral bases, no wheezing for crackles noted  CV:  Palpation and auscultation of heart done , regular rate and rhythm, no murmur, rub, or gallop, peripheral edema trace edema+ in lower extremities  ABDOMEN:  bowel sounds normal, soft, non-tender, no distension  SKIN:  " Inspection of skin and subcutaneous tissueskin tear on right inner leg, covered with steri strips and tegaderm, no s/s of infection, Palpation of skin and subcutaneous tissue skin tear on right inner leg, covered with steri strips and tegaderm, no s/s of infection  PSYCH:  oriented X 3, affect and mood normal    Recent Labs:     CBC RESULTS:   Recent Labs   Lab Test  08/22/18   0517  08/20/18   0605   WBC  4.1  4.9   RBC  2.96*  2.81*   HGB  9.0*  8.8*   HCT  30.4*  29.2*   MCV  103*  104*   MCH  30.4  31.3   MCHC  29.6*  30.1*   RDW  14.0  14.0   PLT  212  201       Last Basic Metabolic Panel:  Recent Labs   Lab Test  09/07/18   0825  09/05/18   0835   NA  145*  145*   POTASSIUM  4.2  4.2   CHLORIDE  102  99   HEIDY  8.9  9.2   CO2  39*  39*   BUN  27  29   CR  1.53*  1.61*   GLC  80  105*       Liver Function Studies -   Recent Labs   Lab Test  08/14/18   0724  08/13/18   2328   PROTTOTAL  5.5*  5.8*   ALBUMIN  2.5*  2.7*   BILITOTAL  0.5  0.4   ALKPHOS  58  61   AST  13  15   ALT  12  13       Assessment/Plan:  (I50.43) Acute on chronic combined systolic and diastolic congestive heart failure (H)  (primary encounter diagnosis)  (I27.0) Primary pulmonary hypertension (H)  Comment: Chronic, controlled with medications  Plan: Continue with Bumex, hydralazine, and atenolol. BMP in 1 month.     (N18.3) CKD (chronic kidney disease) stage 3, GFR 30-59 ml/min  Comment: Chronic, controlled   Plan: Will need to follow up in renal function in 1 month. Avoid nephrotoxic medication      Electronically signed by  Rayne Marks NP

## 2018-09-14 NOTE — LETTER
9/14/2018        RE: Celeste Hawthorne  [facility]  [address] [facility] [address]        [place] [facility]    Chief Complaint   Patient presents with     RECHECK       [place] Medical Record Number:  [id number]    HPI:    Celeste Hawthorne is a 89 year old  (7/30/1915), who is being seen today for an episodic care visit at [facility].  HPI information obtained from: facility chart records, facility staff, patient report and [facility] chart review.Today's concern is:  Acute on chronic combined systolic and diastolic congestive heart failure (H)  Primary pulmonary hypertension (H)  Patient was seen today to follow up on recent pneumonia. She denies shortness of breath or cough. She denies increase in swelling in her lower extremities. Her weight has been stable. She remains on bumex, atenolol, hydralazine. Her blood pressures are within normal limits for her age. Her heart rate is controlled.     CKD (chronic kidney disease) stage 3, GFR 30-59 ml/min  Recent pneumonia and increase in bumex, her kidney function was on the rise. Likely too dry.        BP 09/06-09/13: 109-168/60-98 mmHg    ALLERGIES: Amoxicillin  Past Medical, Surgical, Family and Social History reviewed and updated in [facility].    Current Outpatient Prescriptions   Medication Sig Dispense Refill     ACETAMINOPHEN PO Take 1,000 mg by mouth 3 times daily        ammonium lactate (LAC-HYDRIN) 12 % lotion Apply topically 2 times daily       atenolol (TENORMIN) 25 MG tablet Take 25 mg by mouth daily.       Bumetanide (BUMEX PO) Take 1 mg by mouth daily        Carboxymethylcellulose Sod PF (REFRESH PLUS) 0.5 % SOLN ophthalmic solution Place 1 drop into both eyes 3 times daily        ferrous sulfate (IRON) 325 (65 Fe) MG tablet Take 1 tablet (325 mg) by mouth daily (with breakfast) 90 tablet 2     gabapentin (NEURONTIN) 100 MG capsule Take 300 mg by mouth 2 times daily        HydrALAZINE HCl (APRESOLINE  "PO) Take 10 mg by mouth 3 times daily        multivitamin (OCUVITE) TABS Take 1 tablet by mouth daily       oxyCODONE IR (ROXICODONE) 5 MG tablet Take 0.5 tablets (2.5 mg) by mouth 4 times daily and q 8 hours prn 3 tablet 0     pantoprazole (PROTONIX) 40 MG EC tablet Take 1 tablet (40 mg) by mouth daily Take 30-60 minutes before a meal. 30 tablet 1     polyethylene glycol (MIRALAX/GLYCOLAX) packet Take 1 packet by mouth daily       trolamine salicylate (ASPERCREME) 10 % cream Apply topically 3 times daily ; Apply to bilateral shoulders for shoulder pain. Massage 2\" on each shoulder       VITAMIN D, CHOLECALCIFEROL, PO Take 1,000 Units by mouth daily       Warfarin Therapy Reminder 1 each continuous prn       Medications reviewed:  Medications reconciled to facility chart and changes were made to reflect current medications as identified as above med list. Below are the changes that were made:   Medications stopped since last EPIC medication reconciliation:   There are no discontinued medications.    Medications started since last [facility] medication reconciliation:  No orders of the defined types were placed in this encounter.      REVIEW OF SYSTEMS:  10 point ROS of systems including Constitutional, Eyes, Respiratory, Cardiovascular, Gastroenterology, Genitourinary, Integumentary, Muscularskeletal, Psychiatric were all negative except for pertinent positives noted in my HPI.    Physical Exam:  /78  Pulse 84  Temp 98  F (36.7  C)  Resp 18  Ht 5' 4\" (1.626 m)  Wt 141 lb 3.2 oz (64 kg)  SpO2 96%  BMI 24.24 kg/m2  GENERAL APPEARANCE:  Alert, in no distress, cooperative  EYES:  EOM normal, conjunctiva and lids normal  RESP:  respiratory effort and palpation of chest normal, lungs clear to auscultation , no respiratory distress, diminished breath sounds bilateral bases, no wheezing for crackles noted  CV:  Palpation and auscultation of heart done , regular rate and rhythm, no murmur, rub, or gallop, peripheral " edema trace edema+ in lower extremities  ABDOMEN:  bowel sounds normal, soft, non-tender, no distension  SKIN:  Inspection of skin and subcutaneous tissueskin tear on right inner leg, covered with steri strips and tegaderm, no s/s of infection, Palpation of skin and subcutaneous tissue skin tear on right inner leg, covered with steri strips and tegaderm, no s/s of infection  PSYCH:  oriented X 3, affect and mood normal    Recent Labs:     CBC RESULTS:   Recent Labs   Lab Test  08/22/18   0517  08/20/18   0605   WBC  4.1  4.9   RBC  2.96*  2.81*   HGB  9.0*  8.8*   HCT  30.4*  29.2*   MCV  103*  104*   MCH  30.4  31.3   MCHC  29.6*  30.1*   RDW  14.0  14.0   PLT  212  201       Last Basic Metabolic Panel:  Recent Labs   Lab Test  09/07/18   0825  09/05/18   0835   NA  145*  145*   POTASSIUM  4.2  4.2   CHLORIDE  102  99   HEIDY  8.9  9.2   CO2  39*  39*   BUN  27  29   CR  1.53*  1.61*   GLC  80  105*       Liver Function Studies -   Recent Labs   Lab Test  08/14/18   0724  08/13/18   2328   PROTTOTAL  5.5*  5.8*   ALBUMIN  2.5*  2.7*   BILITOTAL  0.5  0.4   ALKPHOS  58  61   AST  13  15   ALT  12  13       Assessment/Plan:  (I50.43) Acute on chronic combined systolic and diastolic congestive heart failure (H)  (primary encounter diagnosis)  (I27.0) Primary pulmonary hypertension (H)  Comment: Chronic, controlled with medications  Plan: Continue with Bumex, hydralazine, and atenolol. BMP in 1 month.     (N18.3) CKD (chronic kidney disease) stage 3, GFR 30-59 ml/min  Comment: Chronic, controlled   Plan: Will need to follow up in renal function in 1 month. Avoid nephrotoxic medication      Electronically signed by  Rayne Marks NP      Sincerely,        Rayne Marks NP

## 2018-10-01 NOTE — PROGRESS NOTES
[facility]  Chief Complaint   Patient presents with     Annual Comprehensive Nursing Home       [place] Medical Record Number:  [id number]    HPI:    Celeste Hawthorne is a 89 year old  (7/30/1915), who is being seen today for an annual comprehensive visit at [facility].  HPI information obtained from: facility chart records, facility staff, patient report and [place] Epic chart review.  Today's concerns are:    There are no diagnoses linked to this encounter.  Acute on chronic combined systolic and diastolic congestive heart failure (H)  Primary pulmonary hypertension (H)  CKD (chronic kidney disease) stage 3, GFR 30-59 ml/min (H)  Patient denies increase in shortness of breath or swelling. She denies cough. She stated she is sleeping at night as normal for her. Her weight has been stable. She remains on iron, bumex and hydralazine.     Dementia without behavioral disturbance, unspecified dementia type  She is wheelchair bound and assist with all transfers. She is able to make needs known. Per staff she has not had and changes in cognition and her mood is at her baseline.     Other chronic pain  Patient denies pain on assessment today. She remains on gabapentin and has PRN oxycodone available to her.     History of deep venous thrombosis  She remains on warfarin, no bleeding noted.      BP goals are <150/90 mm Hg.This is higher than ACC and AHA recommendations due to goals of care, risk for hypotension, risk of dizziness and falls and risk of tissue/cerebral hypoperfusion. Patient is stable with current plan of care and routine assessment.    BP Range: 112-159/66-84 mmHg  Pulse: 68-84 bpm    ALLERGIES: Amoxicillin  PROBLEM LIST:  Patient Active Problem List   Diagnosis     Fall: at home     Physical deconditioning     Dysphagia due to Esophageal stricture/dysmotility Issues     Bilateral leg edema     Secondary localized osteoarthrosis, shoulder region     Left rotator cuff tear  arthropathy: 8/19/14     Pain in shoulder     Constipation     Gait instability, recent fall in her home     Personal history of malignant neoplasm of breast, ~ 80 yrs old     Routine general medical examination at a health care facility,done 10-     Benign hypertension with CKD (chronic kidney disease) stage III     Hip pain, right>> since she had a fall     Adjustment insomnia     Frailty     Hx of pleural effusion, Dec 2016     Dementia without behavioral disturbance, unspecified dementia type     Personal history of DVT (deep vein thrombosis)     Hx of fracture of femur     CKD (chronic kidney disease) stage 3, GFR 30-59 ml/min     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     Trigger ring finger of right hand     Weight gain     Encounter for monitoring coumadin therapy     DVT, lower extremity, proximal, acute, right (H)     Pain of right lower extremity     Pneumonia     Acute on chronic combined systolic and diastolic congestive heart failure (H)     Primary pulmonary hypertension (H)     PAST MEDICAL HISTORY:  has a past medical history of Anemia; Anemia due to blood loss, acute (5/14/2012); Breast cancer (H) (1981); Chronic respiratory failure with hypoxia (H); Congestive heart failure (H); Constipation; DVT (deep vein thrombosis) in pregnancy (H); History of blood transfusion; Hypertension; Left shoulder pain; Macular degeneration; and Pulmonary hypertension.  PAST SURGICAL HISTORY:  has a past surgical history that includes Esophagoscopy (5/11/2012); Breast surgery (1981); Open reduction internal fixation femur distal (5/8/2012); Eye surgery; hemorrhoidectomy; Repair esophageal stricture; and Reverse arthroplasty shoulder (Left, 8/19/2014).  FAMILY HISTORY: family history includes Other Cancer in her son.  SOCIAL HISTORY:  reports that she has never smoked. She has never used smokeless tobacco. She reports that she drinks alcohol. She reports that she does not use illicit  "drugs.  IMMUNIZATIONS:  Most Recent Immunizations   Administered Date(s) Administered     DTAP (<7y) 10/11/2016     Influenza (High Dose) 3 valent vaccine 10/18/2017     Influenza (IIV3) PF 09/10/2013     Pneumo Conj 13-V (2010&after) 10/11/2016     Pneumococcal 23 valent 12/09/2003     TDAP Vaccine (Adacel) 07/28/2006     Above immunizations pulled from [facility]. [facility] and facility records also reconciled. Outstanding information sent to  to update [facility].  Future immunizations are not needed at this point as all recommended immunizations are up to date.   MEDICATIONS:  Current Outpatient Prescriptions   Medication Sig Dispense Refill     ACETAMINOPHEN PO Take 1,000 mg by mouth 3 times daily        ammonium lactate (LAC-HYDRIN) 12 % lotion Apply topically 2 times daily       atenolol (TENORMIN) 25 MG tablet Take 25 mg by mouth daily.       Bumetanide (BUMEX PO) Take 1 mg by mouth daily        Carboxymethylcellulose Sod PF (REFRESH PLUS) 0.5 % SOLN ophthalmic solution Place 1 drop into both eyes 3 times daily        ferrous sulfate (IRON) 325 (65 Fe) MG tablet Take 1 tablet (325 mg) by mouth daily (with breakfast) 90 tablet 2     gabapentin (NEURONTIN) 100 MG capsule Take 300 mg by mouth 2 times daily        HydrALAZINE HCl (APRESOLINE PO) Take 10 mg by mouth 3 times daily        multivitamin (OCUVITE) TABS Take 1 tablet by mouth daily       oxyCODONE IR (ROXICODONE) 5 MG tablet Take 0.5 tablets (2.5 mg) by mouth 4 times daily and q 8 hours prn 3 tablet 0     pantoprazole (PROTONIX) 40 MG EC tablet Take 1 tablet (40 mg) by mouth daily Take 30-60 minutes before a meal. 30 tablet 1     polyethylene glycol (MIRALAX/GLYCOLAX) packet Take 1 packet by mouth daily       trolamine salicylate (ASPERCREME) 10 % cream Apply topically 3 times daily ; Apply to bilateral shoulders for shoulder pain. Massage 2\" on each shoulder       VITAMIN D, CHOLECALCIFEROL, PO Take 1,000 Units by mouth daily       " "Warfarin Therapy Reminder 1 each continuous prn       Case Management:  I have reviewed the facility/SNF care plan/MDS which was done 9/27/18, including the falls risk, nutrition and pain screening. I also reviewed the current immunizations, and preventive care..Future cancer screening is not clinically indicated secondary to age/goals of care Patient's desire to return to the community is not present. Current Level of Care is appropriate.    Advance Directive Discussion:    I reviewed the current advanced directives as reflected in EPIC, the POLST and the facility chart, and verified the congruency of orders. I contacted the first party and left a message regarding the plan of Care.  I did not due to cognitive impairment review the advance directives with the resident.     Team Discussion:  I communicated with the appropriate disciplines involved with the Plan of Care:   Nursing      Patient Goal:  Patient's goal is unobtainable secondary to cognitive impairment.    Information reviewed:  Medications, vital signs, orders, and nursing notes.    ROS:  Unobtainable secondary to cognitive impairment.     Exam:  /83  Pulse 68  Temp 98  F (36.7  C)  Resp 16  Ht 5' 4\" (1.626 m)  Wt 146 lb 9.6 oz (66.5 kg)  SpO2 96%  BMI 25.16 kg/m2  GENERAL APPEARANCE:  Alert, in no distress  ENT:  Mouth and posterior oropharynx normal, moist mucous membranes, [place]  EYES:  EOM, conjunctivae, lids, pupils and irises normal, wears glasses  RESP:  respiratory effort and palpation of chest normal, lungs clear to auscultation , diminished breath sounds bilateral bases  CV:  Palpation and auscultation of heart done , regular rate and rhythm, no murmur, rub, or gallop, peripheral edema 1+ in bilateral lower extremities, ace wrapped today  ABDOMEN:  normal bowel sounds, soft, nontender, no hepatosplenomegaly or other masses  LYMPHATICS:  No adenopathy in neck   SKIN:  Inspection of skin and subcutaneous tissue baseline, Palpation of " skin and subcutaneous tissue baseline  PSYCH:  oriented to self, normal insight, judgement and memory, affect and mood normal     Lab/Diagnostic data:   CBC RESULTS:   Recent Labs   Lab Test  08/22/18   0517  08/20/18   0605   WBC  4.1  4.9   RBC  2.96*  2.81*   HGB  9.0*  8.8*   HCT  30.4*  29.2*   MCV  103*  104*   MCH  30.4  31.3   MCHC  29.6*  30.1*   RDW  14.0  14.0   PLT  212  201       Last Basic Metabolic Panel:  Recent Labs   Lab Test  09/07/18   0825  09/05/18   0835   NA  145*  145*   POTASSIUM  4.2  4.2   CHLORIDE  102  99   HEIDY  8.9  9.2   CO2  39*  39*   BUN  27  29   CR  1.53*  1.61*   GLC  80  105*       Liver Function Studies -   Recent Labs   Lab Test  08/14/18   0724  08/13/18   2328   PROTTOTAL  5.5*  5.8*   ALBUMIN  2.5*  2.7*   BILITOTAL  0.5  0.4   ALKPHOS  58  61   AST  13  15   ALT  12  13     ASSESSMENT/PLAN  (I50.43) Acute on chronic combined systolic and diastolic congestive heart failure (H)  (primary encounter diagnosis)  (I27.0) Primary pulmonary hypertension (H)  (N18.3) CKD (chronic kidney disease) stage 3, GFR 30-59 ml/min (H)  Comment: chronic, controlled with medications  Plan: Continue bumex, and hydralazine, continue to monitor weight and fluid volume stasis, lab monitoring as needed    (F03.90) Dementia without behavioral disturbance, unspecified dementia type  Comment: chronic, controlled  Plan: No s/s of behaviors, staff to provide all cares and medication monitoring    (G89.29) Other chronic pain  Comment: chronic, controlled  Plan: Continue with gabapentin and PRN oxycodone    (Z86.718) History of deep venous thrombosis  Comment: chronic  Plan:  Continue with warfarin and INR monitoring    Electronically signed by:  Rayne Marks NP

## 2018-10-03 NOTE — LETTER
10/3/2018        RE: Celeste Hawthorne  [facility]  [address] [facility] [address]        [place] [facility]  Chief Complaint   Patient presents with     Annual Comprehensive Nursing Home       [place] Medical Record Number:  [id number]    HPI:    Celeste Hawthorne is a 89 year old  (7/30/1915), who is being seen today for an annual comprehensive visit at [facility].  HPI information obtained from: facility chart records, facility staff, patient report and [facility] chart review.  Today's concerns are:    There are no diagnoses linked to this encounter.  Acute on chronic combined systolic and diastolic congestive heart failure (H)  Primary pulmonary hypertension (H)  CKD (chronic kidney disease) stage 3, GFR 30-59 ml/min (H)  Patient denies increase in shortness of breath or swelling. She denies cough. She stated she is sleeping at night as normal for her. Her weight has been stable. She remains on iron, bumex and hydralazine.     Dementia without behavioral disturbance, unspecified dementia type  She is wheelchair bound and assist with all transfers. She is able to make needs known. Per staff she has not had and changes in cognition and her mood is at her baseline.     Other chronic pain  Patient denies pain on assessment today. She remains on gabapentin and has PRN oxycodone available to her.     History of deep venous thrombosis  She remains on warfarin, no bleeding noted.      BP goals are <150/90 mm Hg.This is higher than ACC and AHA recommendations due to goals of care, risk for hypotension, risk of dizziness and falls and risk of tissue/cerebral hypoperfusion. Patient is stable with current plan of care and routine assessment.    BP Range: 112-159/66-84 mmHg  Pulse: 68-84 bpm    ALLERGIES: Amoxicillin  PROBLEM LIST:  Patient Active Problem List   Diagnosis     Fall: at home     Physical deconditioning     Dysphagia due to Esophageal  stricture/dysmotility Issues     Bilateral leg edema     Secondary localized osteoarthrosis, shoulder region     Left rotator cuff tear arthropathy: 8/19/14     Pain in shoulder     Constipation     Gait instability, recent fall in her home     Personal history of malignant neoplasm of breast, ~ 80 yrs old     Routine general medical examination at a health care facility,done 10-     Benign hypertension with CKD (chronic kidney disease) stage III     Hip pain, right>> since she had a fall     Adjustment insomnia     Frailty     Hx of pleural effusion, Dec 2016     Dementia without behavioral disturbance, unspecified dementia type     Personal history of DVT (deep vein thrombosis)     Hx of fracture of femur     CKD (chronic kidney disease) stage 3, GFR 30-59 ml/min     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     Trigger ring finger of right hand     Weight gain     Encounter for monitoring coumadin therapy     DVT, lower extremity, proximal, acute, right (H)     Pain of right lower extremity     Pneumonia     Acute on chronic combined systolic and diastolic congestive heart failure (H)     Primary pulmonary hypertension (H)     PAST MEDICAL HISTORY:  has a past medical history of Anemia; Anemia due to blood loss, acute (5/14/2012); Breast cancer (H) (1981); Chronic respiratory failure with hypoxia (H); Congestive heart failure (H); Constipation; DVT (deep vein thrombosis) in pregnancy (H); History of blood transfusion; Hypertension; Left shoulder pain; Macular degeneration; and Pulmonary hypertension.  PAST SURGICAL HISTORY:  has a past surgical history that includes Esophagoscopy (5/11/2012); Breast surgery (1981); Open reduction internal fixation femur distal (5/8/2012); Eye surgery; hemorrhoidectomy; Repair esophageal stricture; and Reverse arthroplasty shoulder (Left, 8/19/2014).  FAMILY HISTORY: family history includes Other Cancer in her son.  SOCIAL HISTORY:  reports that she has never  smoked. She has never used smokeless tobacco. She reports that she drinks alcohol. She reports that she does not use illicit drugs.  IMMUNIZATIONS:  Most Recent Immunizations   Administered Date(s) Administered     DTAP (<7y) 10/11/2016     Influenza (High Dose) 3 valent vaccine 10/18/2017     Influenza (IIV3) PF 09/10/2013     Pneumo Conj 13-V (2010&after) 10/11/2016     Pneumococcal 23 valent 12/09/2003     TDAP Vaccine (Adacel) 07/28/2006     Above immunizations pulled from [facility]. [facility]IC and facility records also reconciled. Outstanding information sent to  to update [facility].  Future immunizations are not needed at this point as all recommended immunizations are up to date.   MEDICATIONS:  Current Outpatient Prescriptions   Medication Sig Dispense Refill     ACETAMINOPHEN PO Take 1,000 mg by mouth 3 times daily        ammonium lactate (LAC-HYDRIN) 12 % lotion Apply topically 2 times daily       atenolol (TENORMIN) 25 MG tablet Take 25 mg by mouth daily.       Bumetanide (BUMEX PO) Take 1 mg by mouth daily        Carboxymethylcellulose Sod PF (REFRESH PLUS) 0.5 % SOLN ophthalmic solution Place 1 drop into both eyes 3 times daily        ferrous sulfate (IRON) 325 (65 Fe) MG tablet Take 1 tablet (325 mg) by mouth daily (with breakfast) 90 tablet 2     gabapentin (NEURONTIN) 100 MG capsule Take 300 mg by mouth 2 times daily        HydrALAZINE HCl (APRESOLINE PO) Take 10 mg by mouth 3 times daily        multivitamin (OCUVITE) TABS Take 1 tablet by mouth daily       oxyCODONE IR (ROXICODONE) 5 MG tablet Take 0.5 tablets (2.5 mg) by mouth 4 times daily and q 8 hours prn 3 tablet 0     pantoprazole (PROTONIX) 40 MG EC tablet Take 1 tablet (40 mg) by mouth daily Take 30-60 minutes before a meal. 30 tablet 1     polyethylene glycol (MIRALAX/GLYCOLAX) packet Take 1 packet by mouth daily       trolamine salicylate (ASPERCREME) 10 % cream Apply topically 3 times daily ; Apply to bilateral shoulders  "for shoulder pain. Massage 2\" on each shoulder       VITAMIN D, CHOLECALCIFEROL, PO Take 1,000 Units by mouth daily       Warfarin Therapy Reminder 1 each continuous prn       Case Management:  I have reviewed the facility/SNF care plan/MDS which was done 9/27/18, including the falls risk, nutrition and pain screening. I also reviewed the current immunizations, and preventive care..Future cancer screening is not clinically indicated secondary to age/goals of care Patient's desire to return to the community is not present. Current Level of Care is appropriate.    Advance Directive Discussion:    I reviewed the current advanced directives as reflected in EPIC, the POLST and the facility chart, and verified the congruency of orders. I contacted the first party and left a message regarding the plan of Care.  I did not due to cognitive impairment review the advance directives with the resident.     Team Discussion:  I communicated with the appropriate disciplines involved with the Plan of Care:   Nursing      Patient Goal:  Patient's goal is unobtainable secondary to cognitive impairment.    Information reviewed:  Medications, vital signs, orders, and nursing notes.    ROS:  Unobtainable secondary to cognitive impairment.     Exam:  /83  Pulse 68  Temp 98  F (36.7  C)  Resp 16  Ht 5' 4\" (1.626 m)  Wt 146 lb 9.6 oz (66.5 kg)  SpO2 96%  BMI 25.16 kg/m2  GENERAL APPEARANCE:  Alert, in no distress  ENT:  Mouth and posterior oropharynx normal, moist mucous membranes, [place]  EYES:  EOM, conjunctivae, lids, pupils and irises normal, wears glasses  RESP:  respiratory effort and palpation of chest normal, lungs clear to auscultation , diminished breath sounds bilateral bases  CV:  Palpation and auscultation of heart done , regular rate and rhythm, no murmur, rub, or gallop, peripheral edema 1+ in bilateral lower extremities, ace wrapped today  ABDOMEN:  normal bowel sounds, soft, nontender, no hepatosplenomegaly or " other masses  LYMPHATICS:  No adenopathy in neck   SKIN:  Inspection of skin and subcutaneous tissue baseline, Palpation of skin and subcutaneous tissue baseline  PSYCH:  oriented to self, normal insight, judgement and memory, affect and mood normal     Lab/Diagnostic data:   CBC RESULTS:   Recent Labs   Lab Test  08/22/18   0517  08/20/18   0605   WBC  4.1  4.9   RBC  2.96*  2.81*   HGB  9.0*  8.8*   HCT  30.4*  29.2*   MCV  103*  104*   MCH  30.4  31.3   MCHC  29.6*  30.1*   RDW  14.0  14.0   PLT  212  201       Last Basic Metabolic Panel:  Recent Labs   Lab Test  09/07/18   0825  09/05/18   0835   NA  145*  145*   POTASSIUM  4.2  4.2   CHLORIDE  102  99   HEIDY  8.9  9.2   CO2  39*  39*   BUN  27  29   CR  1.53*  1.61*   GLC  80  105*       Liver Function Studies -   Recent Labs   Lab Test  08/14/18   0724  08/13/18   2328   PROTTOTAL  5.5*  5.8*   ALBUMIN  2.5*  2.7*   BILITOTAL  0.5  0.4   ALKPHOS  58  61   AST  13  15   ALT  12  13     ASSESSMENT/PLAN  (I50.43) Acute on chronic combined systolic and diastolic congestive heart failure (H)  (primary encounter diagnosis)  (I27.0) Primary pulmonary hypertension (H)  (N18.3) CKD (chronic kidney disease) stage 3, GFR 30-59 ml/min (H)  Comment: chronic, controlled with medications  Plan: Continue bumex, and hydralazine, continue to monitor weight and fluid volume stasis, lab monitoring as needed    (F03.90) Dementia without behavioral disturbance, unspecified dementia type  Comment: chronic, controlled  Plan: No s/s of behaviors, staff to provide all cares and medication monitoring    (G89.29) Other chronic pain  Comment: chronic, controlled  Plan: Continue with gabapentin and PRN oxycodone    (Z86.718) History of deep venous thrombosis  Comment: chronic  Plan:  Continue with warfarin and INR monitoring    Electronically signed by:  Rayne Marks NP          Sincerely,        Rayne Marks NP

## 2018-12-14 NOTE — LETTER
"    2018        RE: Celeste Hawthorne  [facility]  [address] [facility] [address]        Celeste Hawthorne is a 89 year old female seen 2018 at [facility] where she has resided for 2 years (admit 10/2016) seen for routine visit.    Pt is seen in her room, up to WC.   States she is feeling well, no new concerns reported by nursing staff.      2018 hospitalization for pneumonia with infiltrates on CXR, elevated WBC and procalcitonin, and fever.   She was treated with abx and seen by SLP.  She has since recovered, denies current respiratory sx.  By chart review, intermittent respiratory symptoms and cough go back a year or more; she has pulmonary HTN with diastolic CHF, valvular heart abnormalities and peripheral edema.        Past Medical History:   Diagnosis Date     Anemia      Anemia due to blood loss, acute 2012     Breast cancer (H) 1981    right breast     Chronic respiratory failure with hypoxia (H)     On O2 2L/nc     Congestive heart failure (H)      Constipation      DVT (deep vein thrombosis) in pregnancy (H)      History of blood transfusion      Hypertension      Left shoulder pain      Macular degeneration      Pulmonary hypertension       SH: , she has 3 daughters.     Had one son who  of cancer.     ROS: limited, but essentially negative other than above.  BIMS 9/15     Wt Readings from Last 5 Encounters:   18 64.7 kg (142 lb 9.6 oz)   10/01/18 66.5 kg (146 lb 9.6 oz)   18 64 kg (141 lb 3.2 oz)   18 64.1 kg (141 lb 6.4 oz)   18 65.2 kg (143 lb 12.8 oz)        EXAM:  NAD  /70   Pulse 80   Temp 97.5  F (36.4  C)   Resp 16   Ht 1.626 m (5' 4\")   Wt 64.7 kg (142 lb 9.6 oz)   SpO2 94%   BMI 24.48 kg/m      Neck supple without adenopthy  Lungs with markedly decreased BS, coarse crackles bilaterally   Ht RRR s1s2 with occ ectopy, 2/6 MIHIR  Abd soft, NT, no distention, +BS  Ext with trace edema, " wearing LE wraps  Neuro: very limited history, generalized weakness  Psych: affect okay.     Last Comprehensive Metabolic Panel:  Sodium   Date Value Ref Range Status   09/07/2018 145 (H) 133 - 144 mmol/L Final     Potassium   Date Value Ref Range Status   09/07/2018 4.2 3.4 - 5.3 mmol/L Final     Chloride   Date Value Ref Range Status   09/07/2018 102 94 - 109 mmol/L Final     Carbon Dioxide   Date Value Ref Range Status   09/07/2018 39 (H) 20 - 32 mmol/L Final     Anion Gap   Date Value Ref Range Status   09/07/2018 4 3 - 14 mmol/L Final     Glucose   Date Value Ref Range Status   09/07/2018 80 70 - 99 mg/dL Final     Urea Nitrogen   Date Value Ref Range Status   09/07/2018 27 7 - 30 mg/dL Final     Creatinine   Date Value Ref Range Status   09/07/2018 1.53 (H) 0.52 - 1.04 mg/dL Final     GFR Estimate   Date Value Ref Range Status   09/07/2018 31 (L) >60 mL/min/1.7m2 Final     Comment:     Non  GFR Calc     Calcium   Date Value Ref Range Status   09/07/2018 8.9 8.5 - 10.1 mg/dL Final       IMP/PLAN:   (I27.0) Primary pulmonary hypertension (H)  (I50.43) Acute on chronic combined systolic and diastolic congestive heart failure (H)  Comment: with valvular heart disease, peripheral edema, stable weights  BP Readings from Last 3 Encounters:   12/13/18 128/70   10/01/18 159/83   09/14/18 123/78      Pulse Readings from Last 4 Encounters:   12/13/18 80   10/01/18 68   09/14/18 84   08/28/18 84      Plan: current regimen includes atenolol, hydralazine and bumetanide.       (I82.4Y1) DVT, lower extremity, proximal, acute, right (H)  Comment: recurrent   Plan: warfarin per INR until goals of care change       (N18.3) CKD (chronic kidney disease) stage 3, GFR 30-59 ml/min  Comment: low GFR     Plan: follow BMP; consider decreasing bumetanide if volume status okay and GFR worsens.       (F03.90) Dementia without behavioral disturbance, unspecified dementia type  (R54) Frailty  Comment: late onset, low  functional status  Plan: LTC support for meds, meals, activity    (M15.0) Primary osteoarthritis involving multiple joints   (M25.512) Left shoulder pain, unspecified chronicity  Comment: h/o prior procedure.     Plan: local measures, continue pain regimen which includes scheduled acetaminophen, prn oxycodone     (D53.9) Macrocytic anemia  Comment: PPI and Fe started during hospitalization   Plan: recheck hgb     Mitzy Stanton MD       Sincerely,        Mitzy Stanton MD

## 2019-01-01 ENCOUNTER — NURSING HOME VISIT (OUTPATIENT)
Dept: GERIATRICS | Facility: CLINIC | Age: 84
End: 2019-01-01
Payer: COMMERCIAL

## 2019-01-01 ENCOUNTER — NURSING HOME VISIT (OUTPATIENT)
Dept: GERIATRICS | Facility: CLINIC | Age: 84
End: 2019-01-01
Payer: MEDICARE

## 2019-01-01 ENCOUNTER — HOSPITAL LABORATORY (OUTPATIENT)
Dept: OTHER | Facility: CLINIC | Age: 84
End: 2019-01-01

## 2019-01-01 ENCOUNTER — MEDICAL CORRESPONDENCE (OUTPATIENT)
Dept: HEALTH INFORMATION MANAGEMENT | Facility: CLINIC | Age: 84
End: 2019-01-01

## 2019-01-01 ENCOUNTER — TELEPHONE (OUTPATIENT)
Dept: GERIATRICS | Facility: CLINIC | Age: 84
End: 2019-01-01

## 2019-01-01 VITALS
HEART RATE: 90 BPM | DIASTOLIC BLOOD PRESSURE: 72 MMHG | TEMPERATURE: 97.9 F | OXYGEN SATURATION: 91 % | RESPIRATION RATE: 18 BRPM | WEIGHT: 145.2 LBS | BODY MASS INDEX: 24.79 KG/M2 | SYSTOLIC BLOOD PRESSURE: 139 MMHG | HEIGHT: 64 IN

## 2019-01-01 VITALS
DIASTOLIC BLOOD PRESSURE: 82 MMHG | HEART RATE: 72 BPM | HEIGHT: 64 IN | RESPIRATION RATE: 18 BRPM | OXYGEN SATURATION: 96 % | SYSTOLIC BLOOD PRESSURE: 140 MMHG | TEMPERATURE: 98.4 F | WEIGHT: 140 LBS | BODY MASS INDEX: 23.9 KG/M2

## 2019-01-01 VITALS
WEIGHT: 136.4 LBS | RESPIRATION RATE: 20 BRPM | HEART RATE: 81 BPM | SYSTOLIC BLOOD PRESSURE: 129 MMHG | BODY MASS INDEX: 23.29 KG/M2 | OXYGEN SATURATION: 96 % | DIASTOLIC BLOOD PRESSURE: 83 MMHG | HEIGHT: 64 IN | TEMPERATURE: 97.9 F

## 2019-01-01 VITALS
TEMPERATURE: 97.9 F | DIASTOLIC BLOOD PRESSURE: 95 MMHG | OXYGEN SATURATION: 93 % | HEART RATE: 89 BPM | RESPIRATION RATE: 16 BRPM | HEIGHT: 64 IN | SYSTOLIC BLOOD PRESSURE: 145 MMHG | BODY MASS INDEX: 25.4 KG/M2 | WEIGHT: 148.8 LBS

## 2019-01-01 VITALS
HEART RATE: 96 BPM | WEIGHT: 131.8 LBS | RESPIRATION RATE: 18 BRPM | HEIGHT: 64 IN | OXYGEN SATURATION: 92 % | DIASTOLIC BLOOD PRESSURE: 107 MMHG | TEMPERATURE: 98.1 F | SYSTOLIC BLOOD PRESSURE: 156 MMHG | BODY MASS INDEX: 22.5 KG/M2

## 2019-01-01 VITALS
RESPIRATION RATE: 24 BRPM | SYSTOLIC BLOOD PRESSURE: 127 MMHG | TEMPERATURE: 100 F | OXYGEN SATURATION: 92 % | BODY MASS INDEX: 24.38 KG/M2 | HEART RATE: 106 BPM | WEIGHT: 142.8 LBS | DIASTOLIC BLOOD PRESSURE: 64 MMHG | HEIGHT: 64 IN

## 2019-01-01 VITALS
BODY MASS INDEX: 23.87 KG/M2 | HEART RATE: 90 BPM | HEIGHT: 64 IN | TEMPERATURE: 97.5 F | RESPIRATION RATE: 18 BRPM | OXYGEN SATURATION: 92 % | DIASTOLIC BLOOD PRESSURE: 82 MMHG | WEIGHT: 139.8 LBS | SYSTOLIC BLOOD PRESSURE: 136 MMHG

## 2019-01-01 VITALS
BODY MASS INDEX: 20.86 KG/M2 | WEIGHT: 122.2 LBS | DIASTOLIC BLOOD PRESSURE: 72 MMHG | HEART RATE: 57 BPM | SYSTOLIC BLOOD PRESSURE: 120 MMHG | TEMPERATURE: 97.8 F | HEIGHT: 64 IN | OXYGEN SATURATION: 95 % | RESPIRATION RATE: 18 BRPM

## 2019-01-01 DIAGNOSIS — I82.409 RECURRENT DEEP VEIN THROMBOSIS (DVT) (H): ICD-10-CM

## 2019-01-01 DIAGNOSIS — F03.90 DEMENTIA WITHOUT BEHAVIORAL DISTURBANCE, UNSPECIFIED DEMENTIA TYPE: ICD-10-CM

## 2019-01-01 DIAGNOSIS — I50.43 ACUTE ON CHRONIC COMBINED SYSTOLIC AND DIASTOLIC CONGESTIVE HEART FAILURE (H): Primary | ICD-10-CM

## 2019-01-01 DIAGNOSIS — G89.29 OTHER CHRONIC PAIN: ICD-10-CM

## 2019-01-01 DIAGNOSIS — I27.0 PRIMARY PULMONARY HYPERTENSION (H): ICD-10-CM

## 2019-01-01 DIAGNOSIS — I50.42 CHRONIC COMBINED SYSTOLIC AND DIASTOLIC CONGESTIVE HEART FAILURE (H): Primary | ICD-10-CM

## 2019-01-01 DIAGNOSIS — R06.02 SOB (SHORTNESS OF BREATH): ICD-10-CM

## 2019-01-01 DIAGNOSIS — Z53.9 ERRONEOUS ENCOUNTER--DISREGARD: Primary | ICD-10-CM

## 2019-01-01 DIAGNOSIS — I50.43 ACUTE ON CHRONIC COMBINED SYSTOLIC AND DIASTOLIC CONGESTIVE HEART FAILURE (H): ICD-10-CM

## 2019-01-01 DIAGNOSIS — N18.4 CKD (CHRONIC KIDNEY DISEASE) STAGE 4, GFR 15-29 ML/MIN (H): ICD-10-CM

## 2019-01-01 DIAGNOSIS — Z51.5 HOSPICE CARE PATIENT: ICD-10-CM

## 2019-01-01 DIAGNOSIS — Z71.89 ADVANCE CARE PLANNING: ICD-10-CM

## 2019-01-01 DIAGNOSIS — R53.83 LETHARGY: Primary | ICD-10-CM

## 2019-01-01 DIAGNOSIS — I27.0 PRIMARY PULMONARY HYPERTENSION (H): Primary | ICD-10-CM

## 2019-01-01 DIAGNOSIS — J18.9 PNEUMONIA DUE TO INFECTIOUS ORGANISM, UNSPECIFIED LATERALITY, UNSPECIFIED PART OF LUNG: Primary | ICD-10-CM

## 2019-01-01 DIAGNOSIS — M15.0 PRIMARY OSTEOARTHRITIS INVOLVING MULTIPLE JOINTS: ICD-10-CM

## 2019-01-01 LAB
ANION GAP SERPL CALCULATED.3IONS-SCNC: 2 MMOL/L (ref 3–14)
ANION GAP SERPL CALCULATED.3IONS-SCNC: 4 MMOL/L (ref 3–14)
BASOPHILS # BLD AUTO: 0 10E9/L (ref 0–0.2)
BASOPHILS NFR BLD AUTO: 0.4 %
BUN SERPL-MCNC: 30 MG/DL (ref 7–30)
BUN SERPL-MCNC: 33 MG/DL (ref 7–30)
CALCIUM SERPL-MCNC: 9.2 MG/DL (ref 8.5–10.1)
CALCIUM SERPL-MCNC: 9.6 MG/DL (ref 8.5–10.1)
CHLORIDE SERPL-SCNC: 100 MMOL/L (ref 94–109)
CHLORIDE SERPL-SCNC: 102 MMOL/L (ref 94–109)
CO2 SERPL-SCNC: 38 MMOL/L (ref 20–32)
CO2 SERPL-SCNC: 41 MMOL/L (ref 20–32)
CREAT SERPL-MCNC: 1.13 MG/DL (ref 0.52–1.04)
CREAT SERPL-MCNC: 1.46 MG/DL (ref 0.52–1.04)
DIFFERENTIAL METHOD BLD: ABNORMAL
EOSINOPHIL # BLD AUTO: 0.2 10E9/L (ref 0–0.7)
EOSINOPHIL NFR BLD AUTO: 4.4 %
ERYTHROCYTE [DISTWIDTH] IN BLOOD BY AUTOMATED COUNT: 13.1 % (ref 10–15)
ERYTHROCYTE [DISTWIDTH] IN BLOOD BY AUTOMATED COUNT: 13.7 % (ref 10–15)
GFR SERPL CREATININE-BSD FRML MDRD: 29 ML/MIN/{1.73_M2}
GFR SERPL CREATININE-BSD FRML MDRD: 39 ML/MIN/{1.73_M2}
GLUCOSE SERPL-MCNC: 124 MG/DL (ref 70–99)
GLUCOSE SERPL-MCNC: 126 MG/DL (ref 70–99)
HCT VFR BLD AUTO: 35 % (ref 35–47)
HCT VFR BLD AUTO: 36.2 % (ref 35–47)
HGB BLD-MCNC: 10.7 G/DL (ref 11.7–15.7)
HGB BLD-MCNC: 11.4 G/DL (ref 11.7–15.7)
IMM GRANULOCYTES # BLD: 0 10E9/L (ref 0–0.4)
IMM GRANULOCYTES NFR BLD: 0.4 %
LYMPHOCYTES # BLD AUTO: 0.8 10E9/L (ref 0.8–5.3)
LYMPHOCYTES NFR BLD AUTO: 14.9 %
MCH RBC QN AUTO: 32.2 PG (ref 26.5–33)
MCH RBC QN AUTO: 32.6 PG (ref 26.5–33)
MCHC RBC AUTO-ENTMCNC: 30.6 G/DL (ref 31.5–36.5)
MCHC RBC AUTO-ENTMCNC: 31.5 G/DL (ref 31.5–36.5)
MCV RBC AUTO: 103 FL (ref 78–100)
MCV RBC AUTO: 105 FL (ref 78–100)
MONOCYTES # BLD AUTO: 0.4 10E9/L (ref 0–1.3)
MONOCYTES NFR BLD AUTO: 8.3 %
NEUTROPHILS # BLD AUTO: 3.6 10E9/L (ref 1.6–8.3)
NEUTROPHILS NFR BLD AUTO: 71.6 %
NRBC # BLD AUTO: 0 10*3/UL
NRBC BLD AUTO-RTO: 0 /100
PLATELET # BLD AUTO: 138 10E9/L (ref 150–450)
PLATELET # BLD AUTO: 234 10E9/L (ref 150–450)
POTASSIUM SERPL-SCNC: 4.1 MMOL/L (ref 3.4–5.3)
POTASSIUM SERPL-SCNC: 4.8 MMOL/L (ref 3.4–5.3)
RBC # BLD AUTO: 3.32 10E12/L (ref 3.8–5.2)
RBC # BLD AUTO: 3.5 10E12/L (ref 3.8–5.2)
SODIUM SERPL-SCNC: 143 MMOL/L (ref 133–144)
SODIUM SERPL-SCNC: 144 MMOL/L (ref 133–144)
WBC # BLD AUTO: 10.4 10E9/L (ref 4–11)
WBC # BLD AUTO: 5 10E9/L (ref 4–11)

## 2019-01-01 PROCEDURE — 99309 SBSQ NF CARE MODERATE MDM 30: CPT | Mod: GV | Performed by: NURSE PRACTITIONER

## 2019-01-01 PROCEDURE — 99309 SBSQ NF CARE MODERATE MDM 30: CPT | Performed by: NURSE PRACTITIONER

## 2019-01-01 PROCEDURE — 99309 SBSQ NF CARE MODERATE MDM 30: CPT | Mod: GW | Performed by: NURSE PRACTITIONER

## 2019-01-01 PROCEDURE — 99309 SBSQ NF CARE MODERATE MDM 30: CPT | Performed by: INTERNAL MEDICINE

## 2019-01-01 RX ORDER — IPRATROPIUM BROMIDE AND ALBUTEROL SULFATE 2.5; .5 MG/3ML; MG/3ML
1 SOLUTION RESPIRATORY (INHALATION) 4 TIMES DAILY PRN
COMMUNITY

## 2019-01-01 RX ORDER — HALOPERIDOL 0.5 MG/1
0.5 TABLET ORAL EVERY 6 HOURS PRN
COMMUNITY
Start: 2019-01-01 | End: 2019-01-01

## 2019-01-01 RX ORDER — CEFTRIAXONE 1 G/1
1 INJECTION, POWDER, FOR SOLUTION INTRAMUSCULAR; INTRAVENOUS DAILY
COMMUNITY
Start: 2019-01-01 | End: 2019-01-01

## 2019-01-01 RX ORDER — HYDROMORPHONE HYDROCHLORIDE 1 MG/ML
1 SOLUTION ORAL EVERY 4 HOURS PRN
COMMUNITY
End: 2019-01-01

## 2019-01-01 RX ORDER — ALBUTEROL SULFATE 0.83 MG/ML
2.5 SOLUTION RESPIRATORY (INHALATION)
COMMUNITY

## 2019-01-01 RX ORDER — LORAZEPAM 0.5 MG/1
0.25 TABLET ORAL EVERY 4 HOURS PRN
COMMUNITY
End: 2019-01-01

## 2019-01-01 RX ORDER — CEFTRIAXONE 1 G/1
1 INJECTION, POWDER, FOR SOLUTION INTRAMUSCULAR; INTRAVENOUS ONCE
COMMUNITY
Start: 2019-01-01 | End: 2019-01-01

## 2019-01-01 RX ORDER — BISACODYL 10 MG
10 SUPPOSITORY, RECTAL RECTAL 2 TIMES DAILY PRN
COMMUNITY

## 2019-01-01 ASSESSMENT — MIFFLIN-ST. JEOR
SCORE: 894.3
SCORE: 998.62
SCORE: 958.71
SCORE: 987.74
SCORE: 974.13
SCORE: 1014.95
SCORE: 937.84
SCORE: 975.04

## 2019-01-02 NOTE — PROGRESS NOTES
[facility]    Chief Complaint   Patient presents with     assisted Acute       [facility] Medical Record Number:  [id number]  Place of Service where encounter took place:  [facility]  (S) [[id number]]    HPI:    Celeste Hawthorne is a 89 year old  (7/30/1915), who is being seen today for an episodic care visit.  HPI information obtained from: facility chart records, facility staff and family/first contact daughter report.Today's concern is:  Pneumonia due to infectious organism, unspecified laterality, unspecified part of lung  CKD (chronic kidney disease) stage 4, GFR 15-29 ml/min (H)  Patient is being seen today due to a change in condition. Per staff over the last couple of days she has been coughing more but otherwise no complaints. Her weights have been the same. Today she is minimally responsive with audible wheezing and rhonchi. Family is at the bedside and report they do not want her hospitalized. They are ok with the trial of antibiotics however if she does not turn want to make her comfortable. They are open to lab work and chest xray.     BP 12/25-1/2: 112-194/61-91 mmHg    ALLERGIES: Amoxicillin  Past Medical, Surgical, Family and Social History reviewed and updated in [facility].    Current Outpatient Medications   Medication Sig Dispense Refill     acetaminophen (TYLENOL) 325 MG suppository Place 650 mg rectally every 6 hours       ACETAMINOPHEN PO Take 1,000 mg by mouth 3 times daily        albuterol (PROVENTIL) (2.5 MG/3ML) 0.083% neb solution Take 2.5 mg by nebulization every 2 hours as needed for shortness of breath / dyspnea or wheezing       Carboxymethylcellulose Sod PF (REFRESH PLUS) 0.5 % SOLN ophthalmic solution Place 1 drop into both eyes 3 times daily        cefTRIAXone (ROCEPHIN) 1 GM vial Inject 1 g into the vein once       cefTRIAXone (ROCEPHIN) 1 GM vial Inject 1 g into the vein daily       HydrALAZINE HCl (APRESOLINE PO) Take 10 mg by mouth 3 times daily     "    HYDROmorphone, STANDARD CONC, (DILAUDID) 1 MG/ML oral solution Take 1 mg by mouth every 4 hours as needed for severe pain       ipratropium - albuterol 0.5 mg/2.5 mg/3 mL (DUONEB) 0.5-2.5 (3) MG/3ML neb solution Take 1 vial by nebulization 4 times daily       oxyCODONE IR (ROXICODONE) 5 MG tablet Take 0.5 tablets (2.5 mg) by mouth 4 times daily and q 8 hours prn 3 tablet 0     polyethylene glycol (MIRALAX/GLYCOLAX) packet Take 1 packet by mouth daily       trolamine salicylate (ASPERCREME) 10 % cream Apply topically 3 times daily ; Apply to bilateral shoulders for shoulder pain. Massage 2\" on each shoulder       Warfarin Therapy Reminder 1 each continuous prn       atenolol (TENORMIN) 25 MG tablet Take 25 mg by mouth daily.       Bumetanide (BUMEX PO) Take 1 mg by mouth daily        ferrous sulfate (IRON) 325 (65 Fe) MG tablet Take 1 tablet (325 mg) by mouth daily (with breakfast) 90 tablet 2     gabapentin (NEURONTIN) 100 MG capsule Take 300 mg by mouth 2 times daily        multivitamin (OCUVITE) TABS Take 1 tablet by mouth daily       pantoprazole (PROTONIX) 40 MG EC tablet Take 1 tablet (40 mg) by mouth daily Take 30-60 minutes before a meal. 30 tablet 1     VITAMIN D, CHOLECALCIFEROL, PO Take 1,000 Units by mouth daily       Medications reviewed:  Medications reconciled to facility chart and changes were made to reflect current medications as identified as above med list. Below are the changes that were made:   Medications stopped since last EPIC medication reconciliation:   There are no discontinued medications.    Medications started since last [facility] medication reconciliation:  Orders Placed This Encounter   Medications     albuterol (PROVENTIL) (2.5 MG/3ML) 0.083% neb solution     Sig: Take 2.5 mg by nebulization every 2 hours as needed for shortness of breath / dyspnea or wheezing     cefTRIAXone (ROCEPHIN) 1 GM vial     Sig: Inject 1 g into the vein once     cefTRIAXone (ROCEPHIN) 1 GM vial     Sig: " "Inject 1 g into the vein daily     ipratropium - albuterol 0.5 mg/2.5 mg/3 mL (DUONEB) 0.5-2.5 (3) MG/3ML neb solution     Sig: Take 1 vial by nebulization 4 times daily     acetaminophen (TYLENOL) 325 MG suppository     Sig: Place 650 mg rectally every 6 hours     HYDROmorphone, STANDARD CONC, (DILAUDID) 1 MG/ML oral solution     Sig: Take 1 mg by mouth every 4 hours as needed for severe pain       REVIEW OF SYSTEMS:  Unobtainable secondary to cognitive impairment.     Physical Exam:  /64   Pulse 106   Temp 100  F (37.8  C)   Resp 24   Ht 1.626 m (5' 4\")   Wt 64.8 kg (142 lb 12.8 oz)   SpO2 92%   BMI 24.51 kg/m    GENERAL APPEARANCE:  minimally responsive  RESP:  rhonchi throughout all lung fields, expiratory wheezes, dyspneic, using accessory muscles  CV:  Palpation and auscultation of heart done , peripheral edema 1+ in bilateral lower extremtiies  ABDOMEN:  normal bowel sounds, soft, nontender, no hepatosplenomegaly or other masses  SKIN:  Inspection of skin and subcutaneous tissue baseline, Palpation of skin and subcutaneous tissue baseline    Recent Labs:     CBC RESULTS:   Recent Labs   Lab Test 01/02/19  0850 08/22/18  0517   WBC 10.4 4.1   RBC 3.32* 2.96*   HGB 10.7* 9.0*   HCT 35.0 30.4*   * 103*   MCH 32.2 30.4   MCHC 30.6* 29.6*   RDW 13.7 14.0   * 212       Last Basic Metabolic Panel:  Recent Labs   Lab Test 01/02/19  0850 09/07/18  0825    145*   POTASSIUM 4.8 4.2   CHLORIDE 100 102   HEIDY 9.2 8.9   CO2 41* 39*   BUN 33* 27   CR 1.46* 1.53*   * 80       Liver Function Studies -   Recent Labs   Lab Test 08/14/18  0724 08/13/18  2328   PROTTOTAL 5.5* 5.8*   ALBUMIN 2.5* 2.7*   BILITOTAL 0.5 0.4   ALKPHOS 58 61   AST 13 15   ALT 12 13       Assessment/Plan:  (J18.9) Pneumonia due to infectious organism, unspecified laterality, unspecified part of lung  (primary encounter diagnosis)  (N18.4) CKD (chronic kidney disease) stage 4, GFR 15-29 ml/min (H)  Comment: acute " change in condition  Plan: Due to CKD, CHF, and COPD patient is at a high mortality rate, she may not turn around very quickly. Will obtain lab work, and a chest xray. Ordered a UA however staff was unable to collect. Likely pneumonia, will treat with IM rocephin and switch to oral if she wakes up. Will hold all oral pills for now and reassess daily. Family would like hospice if she is unable to improve.     Electronically signed by  Rayne Marks NP

## 2019-01-10 PROBLEM — N18.4 CKD (CHRONIC KIDNEY DISEASE) STAGE 4, GFR 15-29 ML/MIN (H): Status: ACTIVE | Noted: 2019-01-01

## 2019-02-19 NOTE — PROGRESS NOTES
[facility]    Chief Complaint   Patient presents with     intermediate Regulatory       [facility] Record Number:  [id number]  Place of Service where encounter took place:  [facility]  ([facility]) [[id number]]    HPI:    Celeste Hawthorne is a 89 year old  (7/30/1915), who is being seen today for a federally mandated E/M visit.  HPI information obtained from: facility chart records, facility staff, patient report and [facility] chart review. 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 She denies cough. She did have pneumonia in January, and responded well to antibiotics. At that time it was discussed with the family hospice vs hospitalization, they do not want her hospitalized but does want antibiotics as able. She remains on iron, bumex and hydralazine.      Dementia without behavioral disturbance, unspecified dementia type  She is wheelchair bound and assist with all transfers, staff report increase in weakness. She is able to make needs known. Per staff she has not had and changes in cognition and her mood is at her baseline. She denies anxiety or depression at this time     Other chronic pain  Patient denies pain on assessment today. She remains on gabapentin and has PRN oxycodone available to her.      History of deep venous thrombosis  She remains on warfarin, no s/s of bleeding or bruising     BP 2/12-2/19: 113-155/65-89 mmHg    ALLERGIES: Amoxicillin  PAST MEDICAL HISTORY:  has a past medical history of Anemia, Anemia due to blood loss, acute (5/14/2012), Breast cancer (H) (1981), Chronic respiratory failure with hypoxia (H), Congestive heart failure (H), Constipation, DVT (deep vein thrombosis) in pregnancy (H), History of blood transfusion, Hypertension, Left  shoulder pain, Macular degeneration, and Pulmonary hypertension (H).  PAST SURGICAL HISTORY:  has a past surgical history that includes Esophagoscopy (5/11/2012); Breast surgery (1981); Open reduction internal fixation femur distal (5/8/2012); Eye surgery; hemorrhoidectomy; Repair esophageal stricture; and Reverse arthroplasty shoulder (Left, 8/19/2014).  FAMILY HISTORY: family history includes Other Cancer in her son.  SOCIAL HISTORY:  reports that  has never smoked. she has never used smokeless tobacco. She reports that she drinks alcohol. She reports that she does not use drugs.    MEDICATIONS:  Current Outpatient Medications   Medication Sig Dispense Refill     ACETAMINOPHEN PO Take 1,000 mg by mouth 3 times daily        albuterol (PROVENTIL) (2.5 MG/3ML) 0.083% neb solution Take 2.5 mg by nebulization every 2 hours as needed for shortness of breath / dyspnea or wheezing       atenolol (TENORMIN) 25 MG tablet Take 25 mg by mouth daily.       Bumetanide (BUMEX PO) Take 1 mg by mouth daily        Carboxymethylcellulose Sod PF (REFRESH PLUS) 0.5 % SOLN ophthalmic solution Place 1 drop into both eyes 3 times daily        ferrous sulfate (IRON) 325 (65 Fe) MG tablet Take 1 tablet (325 mg) by mouth daily (with breakfast) 90 tablet 2     gabapentin (NEURONTIN) 100 MG capsule Take 300 mg by mouth 2 times daily        HydrALAZINE HCl (APRESOLINE PO) Take 10 mg by mouth 2 times daily        HYDROmorphone, STANDARD CONC, (DILAUDID) 1 MG/ML oral solution Take 1 mg by mouth every 4 hours as needed for severe pain       ipratropium - albuterol 0.5 mg/2.5 mg/3 mL (DUONEB) 0.5-2.5 (3) MG/3ML neb solution Take 1 vial by nebulization 4 times daily       multivitamin (OCUVITE) TABS Take 1 tablet by mouth daily       oxyCODONE IR (ROXICODONE) 5 MG tablet Take 0.5 tablets (2.5 mg) by mouth 4 times daily and q 8 hours prn 3 tablet 0     pantoprazole (PROTONIX) 40 MG EC tablet Take 1 tablet (40 mg) by mouth daily Take 30-60 minutes  "before a meal. 30 tablet 1     polyethylene glycol (MIRALAX/GLYCOLAX) packet Take 1 packet by mouth daily       trolamine salicylate (ASPERCREME) 10 % cream Apply topically 3 times daily ; Apply to bilateral shoulders for shoulder pain. Massage 2\" on each shoulder       VITAMIN D, CHOLECALCIFEROL, PO Take 1,000 Units by mouth daily       Warfarin Therapy Reminder 1 each continuous prn       Medications reviewed:  Medications reconciled to facility chart and changes were made to reflect current medications as identified as above med list. Below are the changes that were made:   Medications stopped since last EPIC medication reconciliation:   Medications Discontinued During This Encounter   Medication Reason     cefTRIAXone (ROCEPHIN) 1 GM vial Discontinued by another Health Care Provider     cefTRIAXone (ROCEPHIN) 1 GM vial Discontinued by another Health Care Provider     cefuroxime (CEFTIN) 500 MG tablet Discontinued by another Health Care Provider     acetaminophen (TYLENOL) 325 MG suppository Discontinued by another Health Care Provider     azithromycin (ZITHROMAX) 250 MG tablet Discontinued by another Health Care Provider       Medications started since last [facility] medication reconciliation:  No orders of the defined types were placed in this encounter.    Case Management:  I have reviewed the care plan and MDS and do agree with the plan. Patient's desire to return to the community is not assessible due to cognitive impairment.  Information reviewed:  Medications, vital signs, orders, and nursing notes.    ROS:  Unobtainable secondary to cognitive impairment.     Exam:  Vitals: /72   Pulse 90   Temp 97.9  F (36.6  C)   Resp 18   Ht 1.626 m (5' 4\")   Wt 65.9 kg (145 lb 3.2 oz)   SpO2 91%   BMI 24.92 kg/m    BMI= Body mass index is 24.92 kg/m .  GENERAL APPEARANCE:  Alert, in no distress, cooperative  ENT:  Mouth and posterior oropharynx normal, moist mucous membranes, [place]  EYES:  EOM, conjunctivae, " lids, pupils and irises normal  RESP:  respiratory effort and palpation of chest normal, lungs clear to auscultation , diminished breath sounds throughout all lung fields, no wheezing noted  CV:  Palpation and auscultation of heart done , regular rate and rhythm, no murmur, rub, or gallop, peripheral edema 2+ in bilateral dependent edema  ABDOMEN:  normal bowel sounds, soft, nontender, no hepatosplenomegaly or other masses  SKIN:  Inspection of skin and subcutaneous tissue baseline, Palpation of skin and subcutaneous tissue baseline  PSYCH:  oriented to self and place, memory impaired , affect and mood normal    Lab/Diagnostic data:     CBC RESULTS:   Recent Labs   Lab Test 01/02/19  0850 08/22/18  0517   WBC 10.4 4.1   RBC 3.32* 2.96*   HGB 10.7* 9.0*   HCT 35.0 30.4*   * 103*   MCH 32.2 30.4   MCHC 30.6* 29.6*   RDW 13.7 14.0   * 212       Last Basic Metabolic Panel:  Recent Labs   Lab Test 01/02/19  0850 09/07/18  0825    145*   POTASSIUM 4.8 4.2   CHLORIDE 100 102   HEIDY 9.2 8.9   CO2 41* 39*   BUN 33* 27   CR 1.46* 1.53*   * 80       Liver Function Studies -   Recent Labs   Lab Test 08/14/18  0724 08/13/18  2328   PROTTOTAL 5.5* 5.8*   ALBUMIN 2.5* 2.7*   BILITOTAL 0.5 0.4   ALKPHOS 58 61   AST 13 15   ALT 12 13     ASSESSMENT/PLAN  (I50.43) Acute on chronic combined systolic and diastolic congestive heart failure (H)  (primary encounter diagnosis)  (I27.0) Primary pulmonary hypertension (H)  (N18.4) CKD (chronic kidney disease) stage 4, GFR 15-29 ml/min (H)  Comment: chronic, controlled  Plan: Continue with bumex, hydralazine and atenolol, duonebs. Consider hospice if patient has a decline    (I82.409) Recurrent deep vein thrombosis (DVT) (H)  Comment: chronic on anticoagulation  Plan: Continue with warfarin and monitoring for bleeding    (G89.29) Other chronic pain  Comment: chronic, controlled  Plan: Continue with gabapentin, and PRN oxycodone.     Electronically signed by:  Rayne  TRICIA Marks, NP

## 2019-04-05 NOTE — LETTER
"    2019        RE: Celeste Hawthorne  [facility]  [address] [facility] [address]        Celeste Hawthorne is a 89 year old female seen 2019 at [facility] where she has resided for 2 and a half years (admit 10/2016) seen for routine visit.    Pt is seen in her room, up to WC.   Reports she feels \"tired\"  Denies current dyspnea or cough.        She had an 2018 hospitalization for pneumonia with fever, infiltrates on CXR, elevated WBC and procalcitonin.   She was treated with abx and seen by SLP.  She has had a couple of pneumonias since then as well, treated with IM ceftriaxone and Duonebs.  By chart review, intermittent respiratory symptoms and cough go back a year or more; she has pulmonary HTN with diastolic CHF, valvular heart abnormalities and peripheral edema.    Family does not want patient hospitalized, but okay with abx and treatment in NH.       Past Medical History:   Diagnosis Date     Anemia      Anemia due to blood loss, acute 2012     Breast cancer (H) 1981    right breast     Chronic respiratory failure with hypoxia (H)     On O2 2L/nc     Congestive heart failure (H)      Constipation      DVT (deep vein thrombosis) in pregnancy (H)      History of blood transfusion      Hypertension      Left shoulder pain      Macular degeneration      Pulmonary hypertension (H)       SH: , she has 3 daughters.     Had one son who  of cancer.     ROS: limited, but essentially negative other than above.  BIMS 9/15     Wt Readings from Last 5 Encounters:   19 63.5 kg (140 lb)   19 65.9 kg (145 lb 3.2 oz)   19 64.8 kg (142 lb 12.8 oz)   18 64.7 kg (142 lb 9.6 oz)   10/01/18 66.5 kg (146 lb 9.6 oz)        EXAM:  NAD  /82   Pulse 72   Temp 98.4  F (36.9  C)   Resp 18   Ht 1.626 m (5' 4\")   Wt 63.5 kg (140 lb)   SpO2 96%   BMI 24.03 kg/m      Neck supple without adenopthy  Lungs with markedly decreased BS, coarse " crackles bilaterally   Ht irreg irreg s1s2 with occ ectopy, 3/6 MIHIR  Abd soft, NT, no distention, +BS  Ext with trace edema, wearing tubigrips  Neuro: very limited history, generalized weakness  Psych: affect okay.     Last Comprehensive Metabolic Panel:  Sodium   Date Value Ref Range Status   01/02/2019 143 133 - 144 mmol/L Final     Potassium   Date Value Ref Range Status   01/02/2019 4.8 3.4 - 5.3 mmol/L Final     Chloride   Date Value Ref Range Status   01/02/2019 100 94 - 109 mmol/L Final     Carbon Dioxide   Date Value Ref Range Status   01/02/2019 41 (H) 20 - 32 mmol/L Final     Anion Gap   Date Value Ref Range Status   01/02/2019 2 (L) 3 - 14 mmol/L Final     Glucose   Date Value Ref Range Status   01/02/2019 124 (H) 70 - 99 mg/dL Final     Urea Nitrogen   Date Value Ref Range Status   01/02/2019 33 (H) 7 - 30 mg/dL Final     Creatinine   Date Value Ref Range Status   01/02/2019 1.46 (H) 0.52 - 1.04 mg/dL Final     GFR Estimate   Date Value Ref Range Status   01/02/2019 29 (L) >60 mL/min/[1.73_m2] Final     Comment:     Non  GFR Calc  Starting 12/18/2018, serum creatinine based estimated GFR (eGFR) will be   calculated using the Chronic Kidney Disease Epidemiology Collaboration   (CKD-EPI) equation.       Calcium   Date Value Ref Range Status   01/02/2019 9.2 8.5 - 10.1 mg/dL Final     Lab Results   Component Value Date    WBC 10.4 01/02/2019      HGB 10.7 01/02/2019       01/02/2019       01/02/2019       IMP/PLAN:   (I27.0) Primary pulmonary hypertension (H)  (I50.43) Acute on chronic combined systolic and diastolic congestive heart failure (H)  Comment: with valvular heart disease, peripheral edema, stable weights  BP Readings from Last 3 Encounters:   04/04/19 140/82   02/19/19 139/72   01/02/19 127/64      Pulse Readings from Last 4 Encounters:   04/04/19 72   02/19/19 90   01/02/19 106   12/13/18 80      Plan: current regimen includes atenolol, hydralazine and bumetanide.        (I82.4Y1) DVT, lower extremity, proximal, acute, right (H)  Comment: recurrent   Plan: warfarin per INR until goals of care change       (N18.3) CKD (chronic kidney disease) stage 3, GFR 30-59 ml/min  Comment: low GFR     Plan: follow BMP; renal dosing of meds       (F03.90) Dementia without behavioral disturbance, unspecified dementia type  (R54) Frailty  Comment: late onset, low functional status  Plan: LTC support for meds, meals, activity    (M15.0) Primary osteoarthritis involving multiple joints   (M25.512) Left shoulder pain, unspecified chronicity  Comment: h/o prior procedure.     Plan: local measures, continue pain regimen which includes scheduled acetaminophen, oxycodone; worry that pain regimen is making her more susceptible to pneumonia.   Look to decreasing opiates unless family interested in comfort care only.       (D53.9) Macrocytic anemia  Comment: improved  Plan: decrease Fe to every other day     Mitzy Stanton MD       Sincerely,        Mitzy Stanton MD

## 2019-04-12 NOTE — PROGRESS NOTES
"Celeste Hawthorne is a 89 year old female seen 2019 at [facility] where she has resided for 2 and a half years (admit 10/2016) seen for routine visit.    Pt is seen in her room, up to .   Reports she feels \"tired\"  Denies current dyspnea or cough.        She had an 2018 hospitalization for pneumonia with fever, infiltrates on CXR, elevated WBC and procalcitonin.   She was treated with abx and seen by SLP.  She has had a couple of pneumonias since then as well, treated with IM ceftriaxone and Duonebs.  By chart review, intermittent respiratory symptoms and cough go back a year or more; she has pulmonary HTN with diastolic CHF, valvular heart abnormalities and peripheral edema.    Family does not want patient hospitalized, but okay with abx and treatment in NH.       Past Medical History:   Diagnosis Date     Anemia      Anemia due to blood loss, acute 2012     Breast cancer (H) 1981    right breast     Chronic respiratory failure with hypoxia (H)     On O2 2L/nc     Congestive heart failure (H)      Constipation      DVT (deep vein thrombosis) in pregnancy (H)      History of blood transfusion      Hypertension      Left shoulder pain      Macular degeneration      Pulmonary hypertension (H)       SH: , she has 3 daughters.     Had one son who  of cancer.     ROS: limited, but essentially negative other than above.  BIMS 9/15     Wt Readings from Last 5 Encounters:   19 63.5 kg (140 lb)   19 65.9 kg (145 lb 3.2 oz)   19 64.8 kg (142 lb 12.8 oz)   18 64.7 kg (142 lb 9.6 oz)   10/01/18 66.5 kg (146 lb 9.6 oz)        EXAM:  NAD  /82   Pulse 72   Temp 98.4  F (36.9  C)   Resp 18   Ht 1.626 m (5' 4\")   Wt 63.5 kg (140 lb)   SpO2 96%   BMI 24.03 kg/m     Neck supple without adenopthy  Lungs with markedly decreased BS, coarse crackles bilaterally   Ht irreg irreg s1s2 with occ ectopy, 3/6 MIHIR  Abd soft, NT, no distention, +BS  Ext with trace " edema, wearing tubigrips  Neuro: very limited history, generalized weakness  Psych: affect okay.     Last Comprehensive Metabolic Panel:  Sodium   Date Value Ref Range Status   01/02/2019 143 133 - 144 mmol/L Final     Potassium   Date Value Ref Range Status   01/02/2019 4.8 3.4 - 5.3 mmol/L Final     Chloride   Date Value Ref Range Status   01/02/2019 100 94 - 109 mmol/L Final     Carbon Dioxide   Date Value Ref Range Status   01/02/2019 41 (H) 20 - 32 mmol/L Final     Anion Gap   Date Value Ref Range Status   01/02/2019 2 (L) 3 - 14 mmol/L Final     Glucose   Date Value Ref Range Status   01/02/2019 124 (H) 70 - 99 mg/dL Final     Urea Nitrogen   Date Value Ref Range Status   01/02/2019 33 (H) 7 - 30 mg/dL Final     Creatinine   Date Value Ref Range Status   01/02/2019 1.46 (H) 0.52 - 1.04 mg/dL Final     GFR Estimate   Date Value Ref Range Status   01/02/2019 29 (L) >60 mL/min/[1.73_m2] Final     Comment:     Non  GFR Calc  Starting 12/18/2018, serum creatinine based estimated GFR (eGFR) will be   calculated using the Chronic Kidney Disease Epidemiology Collaboration   (CKD-EPI) equation.       Calcium   Date Value Ref Range Status   01/02/2019 9.2 8.5 - 10.1 mg/dL Final     Lab Results   Component Value Date    WBC 10.4 01/02/2019      HGB 10.7 01/02/2019       01/02/2019       01/02/2019       IMP/PLAN:   (I27.0) Primary pulmonary hypertension (H)  (I50.43) Acute on chronic combined systolic and diastolic congestive heart failure (H)  Comment: with valvular heart disease, peripheral edema, stable weights  BP Readings from Last 3 Encounters:   04/04/19 140/82   02/19/19 139/72   01/02/19 127/64      Pulse Readings from Last 4 Encounters:   04/04/19 72   02/19/19 90   01/02/19 106   12/13/18 80      Plan: current regimen includes atenolol, hydralazine and bumetanide.       (I82.4Y1) DVT, lower extremity, proximal, acute, right (H)  Comment: recurrent   Plan: warfarin per INR until  goals of care change       (N18.3) CKD (chronic kidney disease) stage 3, GFR 30-59 ml/min  Comment: low GFR     Plan: follow BMP; renal dosing of meds       (F03.90) Dementia without behavioral disturbance, unspecified dementia type  (R54) Frailty  Comment: late onset, low functional status  Plan: LTC support for meds, meals, activity    (M15.0) Primary osteoarthritis involving multiple joints   (M25.512) Left shoulder pain, unspecified chronicity  Comment: h/o prior procedure.     Plan: local measures, continue pain regimen which includes scheduled acetaminophen, oxycodone; worry that pain regimen is making her more susceptible to pneumonia.   Look to decreasing opiates unless family interested in comfort care only.       (D53.9) Macrocytic anemia  Comment: improved  Plan: decrease Fe to every other day     Mitzy Stanton MD

## 2019-05-05 NOTE — TELEPHONE ENCOUNTER
Nursing called this morning stating that Celeste's B/P was 203/105    Medications given 10 minutes ago.    Asked staff to check her pressure in one hour and call this NP back.    Nursing called back and B/P is now down significantly.   No new orders    Electronically signed by Sumi Ruiz RN, CNP

## 2019-05-09 NOTE — PROGRESS NOTES
[facility]  [place] Medical Record Number:  [id number]  Place of Service where encounter took place:  [facility]  (S) [[id number]]  Chief Complaint   Patient presents with     Nursing Home Acute       HPI:    Celeste Hawthorne  is a 89 year old (7/30/1915), who is being seen today for an episodic care visit.  HPI information obtained from: facility chart records, facility staff, patient report and [facility] chart review. 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 She does not want to get out of bed. She is refusing to get dressed and is refusing food but is taking bites and sips of fluids. Discussed situation with family who report that she has been saying for a long time that she is tired and ready to die. Today patient reports no symptoms of shortness of breath, she does not appear uncomfortable. Family is requesting hospice care.    Past Medical and Surgical History reviewed in Epic today.    MEDICATIONS:  Current Outpatient Medications   Medication Sig Dispense Refill     ACETAMINOPHEN PO Take 1,000 mg by mouth 3 times daily        albuterol (PROVENTIL) (2.5 MG/3ML) 0.083% neb solution Take 2.5 mg by nebulization every 2 hours as needed for shortness of breath / dyspnea or wheezing       atenolol (TENORMIN) 25 MG tablet Take 25 mg by mouth daily.       Bumetanide (BUMEX PO) Take 1 mg by mouth daily        Carboxymethylcellulose Sod PF (REFRESH PLUS) 0.5 % SOLN ophthalmic solution Place 2 drops into both eyes 4 times daily AND instill 2 drops into both eyes every 2 hours PRN       ferrous sulfate (IRON) 325 (65 Fe) MG tablet Take 1 tablet (325 mg) by mouth daily (with breakfast) 90 tablet 2     gabapentin (NEURONTIN) 100 MG capsule Take 300 mg by mouth 2  "times daily        HydrALAZINE HCl (APRESOLINE PO) Take 10 mg by mouth 2 times daily        ipratropium - albuterol 0.5 mg/2.5 mg/3 mL (DUONEB) 0.5-2.5 (3) MG/3ML neb solution Take 1 vial by nebulization 2 times daily        multivitamin (OCUVITE) TABS Take 1 tablet by mouth daily       oxyCODONE IR (ROXICODONE) 5 MG tablet Take 0.5 tablets (2.5 mg) by mouth 4 times daily and q 8 hours prn 3 tablet 0     polyethylene glycol (MIRALAX/GLYCOLAX) packet Take 1 packet by mouth daily       trolamine salicylate (ASPERCREME) 10 % cream Apply topically 3 times daily ; Apply to bilateral shoulders for shoulder pain. Massage 2\" on each shoulder       VITAMIN D, CHOLECALCIFEROL, PO Take 1,000 Units by mouth daily       Warfarin Therapy Reminder 1 each continuous prn       HYDROmorphone, STANDARD CONC, (DILAUDID) 1 MG/ML oral solution Take 1 mg by mouth every 4 hours as needed for severe pain         REVIEW OF SYSTEMS:  Unobtainable secondary to cognitive impairment.     Objective:  /82   Pulse 90   Temp 97.5  F (36.4  C)   Resp 18   Ht 1.626 m (5' 4\")   Wt 63.4 kg (139 lb 12.8 oz)   SpO2 92%   BMI 24.00 kg/m    Exam:  GENERAL APPEARANCE:  cooperative, lethargy, calm  RESP:  respiratory effort and palpation of chest normal, lungs clear to auscultation , no respiratory distress  CV:  Palpation and auscultation of heart done , regular rate and rhythm, no murmur, rub, or gallop, peripheral edema 1+ in bilateral lower extremities  ABDOMEN:  normal bowel sounds, soft, nontender, no hepatosplenomegaly or other masses  SKIN:  Inspection of skin and subcutaneous tissue baseline, Palpation of skin and subcutaneous tissue baseline  PSYCH:  oriented to self, memory impaired , affect and mood normal    Labs:   Recent labs in [facility] reviewed by me today.     ASSESSMENT/PLAN:  (R53.83) Lethargy  (primary encounter diagnosis)  (Z71.89) Advance care planning  (F03.90) Dementia without behavioral disturbance, unspecified dementia " type  (I50.43) Acute on chronic combined systolic and diastolic congestive heart failure (H)  Comment: worsening overall  Plan: Does not appear to be short of breath, no urinary symptoms. She stated she does not have pain. Will refer to hospice, stop iron and continue with oxycodone as already on.     Electronically signed by:  Rayne Marks NP

## 2019-05-23 NOTE — PROGRESS NOTES
[facility]  [place] Medical Record Number:  [id number]  Place of Service where encounter took place:  [facility]  ([facility]) [[id number]]  Chief Complaint   Patient presents with     Nursing Home Acute       HPI:    Celeste Hawthorne  is a 89 year old (7/30/1915), who is being seen today for an episodic care visit.  HPI information obtained from: facility chart records, facility staff, patient report and [facility] chart review. 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 She has been intermittently getting out of bed. Today she is being seen as she was started on PRN lorazepam as well as PRN haldol, for end of life symptoms. Currently is not having symptoms.     Continuation of PRN order for non-antipsychotic psychotropic medication,  lorazepam, is appropriate due to end-stage palliative care goals and anxiety and SOB and is being reordered today with an end date of 90 days.  Nsg to update [facility] provider of frequency of use 80 days prior to end date.     Continue current medications as they are at the lowest possible dose to maintain his/her quality of life and reduction may cause unnecessary psychological suffering      Past Medical and Surgical History reviewed in Epic today.    MEDICATIONS:  Current Outpatient Medications   Medication Sig Dispense Refill     acetaminophen (TYLENOL) 325 MG suppository Place 650 mg rectally every 4 hours as needed for fever       ACETAMINOPHEN PO Take 1,000 mg by mouth 3 times daily        albuterol (PROVENTIL) (2.5 MG/3ML) 0.083% neb solution Take 2.5 mg by nebulization every 2 hours as needed for shortness of breath / dyspnea or wheezing       atenolol (TENORMIN) 25 MG tablet Take 25 mg by mouth daily.       atropine 1 % SOLN Place 4 drops under the tongue every 2 hours as needed for secretions       bisacodyl  "(DULCOLAX) 10 MG suppository Place 10 mg rectally 2 times daily as needed for constipation       Bumetanide (BUMEX PO) Take 1 mg by mouth daily        Carboxymethylcellulose Sod PF (REFRESH PLUS) 0.5 % SOLN ophthalmic solution Place 2 drops into both eyes 4 times daily AND instill 2 drops into both eyes every 2 hours PRN       gabapentin (NEURONTIN) 100 MG capsule Take 300 mg by mouth 2 times daily        haloperidol (HALDOL) 0.5 MG tablet Take 0.5 mg by mouth every 6 hours as needed for agitation       HydrALAZINE HCl (APRESOLINE PO) Take 10 mg by mouth 2 times daily        ipratropium - albuterol 0.5 mg/2.5 mg/3 mL (DUONEB) 0.5-2.5 (3) MG/3ML neb solution Take 1 vial by nebulization 2 times daily        LORazepam (ATIVAN) 0.5 MG tablet Take 0.25 mg by mouth every 4 hours as needed for anxiety       oxyCODONE IR (ROXICODONE) 5 MG tablet Take 0.5 tablets (2.5 mg) by mouth 4 times daily and q 8 hours prn 3 tablet 0     polyethylene glycol (MIRALAX/GLYCOLAX) packet Take 1 packet by mouth daily       trolamine salicylate (ASPERCREME) 10 % cream Apply topically 3 times daily ; Apply to bilateral shoulders for shoulder pain. Massage 2\" on each shoulder       Warfarin Therapy Reminder 1 each continuous prn       ferrous sulfate (IRON) 325 (65 Fe) MG tablet Take 1 tablet (325 mg) by mouth daily (with breakfast) (Patient not taking: Reported on 5/23/2019) 90 tablet 2     multivitamin (OCUVITE) TABS Take 1 tablet by mouth daily       VITAMIN D, CHOLECALCIFEROL, PO Take 1,000 Units by mouth daily         REVIEW OF SYSTEMS:  Unobtainable secondary to cognitive impairment.     Objective:  /83   Pulse 81   Temp 97.9  F (36.6  C)   Resp 20   Ht 1.626 m (5' 4\")   Wt 61.9 kg (136 lb 6.4 oz)   SpO2 96%   BMI 23.41 kg/m    Exam:  GENERAL APPEARANCE:  Alert, in no distress, cooperative  RESP:  respiratory effort and palpation of chest normal, no respiratory distress, diminished breath sounds bilateral bases  CV:  " Palpation and auscultation of heart done , regular rate and rhythm, no murmur, rub, or gallop, peripheral edema 1+ in bilateral lower extremities  ABDOMEN:  normal bowel sounds, soft, nontender, no hepatosplenomegaly or other masses  PSYCH:  oriented to self and place    Labs:   Recent labs in [facility] reviewed by me today.     ASSESSMENT/PLAN:  (I50.43) Acute on chronic combined systolic and diastolic congestive heart failure (H)  (primary encounter diagnosis)  (R06.02) SOB (shortness of breath)  Comment: chronic, worsening overall decline  Plan: Will discontinue haldol, due to potential of anxiety as well as SOB will keep lorazepam PRN for the next 90 days and then reassess on going use. Continue with hospice cares    Electronically signed by:  Rayne Marks NP

## 2019-05-23 NOTE — LETTER
5/23/2019        RE: Celeste Hawthorne  [facility]  [address] [address]        [facility]  [place] Medical Record Number:  [id number]  Place of Service where encounter took place:  [facility]  ([facility]) [[id number]]  Chief Complaint   Patient presents with     Nursing Home Acute       HPI:    Celeste Hawthorne  is a 89 year old (7/30/1915), who is being seen today for an episodic care visit.  HPI information obtained from: facility chart records, facility staff, patient report and [facility] chart review. 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 She has been intermittently getting out of bed. Today she is being seen as she was started on PRN lorazepam as well as PRN haldol, for end of life symptoms. Currently is not having symptoms.     Continuation of PRN order for non-antipsychotic psychotropic medication,  lorazepam, is appropriate due to end-stage palliative care goals and anxiety and SOB and is being reordered today with an end date of 90 days.  Nsg to update [facility] provider of frequency of use 80 days prior to end date.     Continue current medications as they are at the lowest possible dose to maintain his/her quality of life and reduction may cause unnecessary psychological suffering      Past Medical and Surgical History reviewed in Epic today.    MEDICATIONS:  Current Outpatient Medications   Medication Sig Dispense Refill     acetaminophen (TYLENOL) 325 MG suppository Place 650 mg rectally every 4 hours as needed for fever       ACETAMINOPHEN PO Take 1,000 mg by mouth 3 times daily        albuterol (PROVENTIL) (2.5 MG/3ML) 0.083% neb solution Take 2.5 mg by nebulization every 2 hours as needed for shortness of breath / dyspnea or wheezing       atenolol (TENORMIN) 25 MG tablet Take 25 mg by mouth  "daily.       atropine 1 % SOLN Place 4 drops under the tongue every 2 hours as needed for secretions       bisacodyl (DULCOLAX) 10 MG suppository Place 10 mg rectally 2 times daily as needed for constipation       Bumetanide (BUMEX PO) Take 1 mg by mouth daily        Carboxymethylcellulose Sod PF (REFRESH PLUS) 0.5 % SOLN ophthalmic solution Place 2 drops into both eyes 4 times daily AND instill 2 drops into both eyes every 2 hours PRN       gabapentin (NEURONTIN) 100 MG capsule Take 300 mg by mouth 2 times daily        haloperidol (HALDOL) 0.5 MG tablet Take 0.5 mg by mouth every 6 hours as needed for agitation       HydrALAZINE HCl (APRESOLINE PO) Take 10 mg by mouth 2 times daily        ipratropium - albuterol 0.5 mg/2.5 mg/3 mL (DUONEB) 0.5-2.5 (3) MG/3ML neb solution Take 1 vial by nebulization 2 times daily        LORazepam (ATIVAN) 0.5 MG tablet Take 0.25 mg by mouth every 4 hours as needed for anxiety       oxyCODONE IR (ROXICODONE) 5 MG tablet Take 0.5 tablets (2.5 mg) by mouth 4 times daily and q 8 hours prn 3 tablet 0     polyethylene glycol (MIRALAX/GLYCOLAX) packet Take 1 packet by mouth daily       trolamine salicylate (ASPERCREME) 10 % cream Apply topically 3 times daily ; Apply to bilateral shoulders for shoulder pain. Massage 2\" on each shoulder       Warfarin Therapy Reminder 1 each continuous prn       ferrous sulfate (IRON) 325 (65 Fe) MG tablet Take 1 tablet (325 mg) by mouth daily (with breakfast) (Patient not taking: Reported on 5/23/2019) 90 tablet 2     multivitamin (OCUVITE) TABS Take 1 tablet by mouth daily       VITAMIN D, CHOLECALCIFEROL, PO Take 1,000 Units by mouth daily         REVIEW OF SYSTEMS:  Unobtainable secondary to cognitive impairment.     Objective:  /83   Pulse 81   Temp 97.9  F (36.6  C)   Resp 20   Ht 1.626 m (5' 4\")   Wt 61.9 kg (136 lb 6.4 oz)   SpO2 96%   BMI 23.41 kg/m     Exam:  GENERAL APPEARANCE:  Alert, in no distress, cooperative  RESP:  respiratory " effort and palpation of chest normal, no respiratory distress, diminished breath sounds bilateral bases  CV:  Palpation and auscultation of heart done , regular rate and rhythm, no murmur, rub, or gallop, peripheral edema 1+ in bilateral lower extremities  ABDOMEN:  normal bowel sounds, soft, nontender, no hepatosplenomegaly or other masses  PSYCH:  oriented to self and place    Labs:   Recent labs in [facility] reviewed by me today.     ASSESSMENT/PLAN:  (I50.43) Acute on chronic combined systolic and diastolic congestive heart failure (H)  (primary encounter diagnosis)  (R06.02) SOB (shortness of breath)  Comment: chronic, worsening overall decline  Plan: Will discontinue haldol, due to potential of anxiety as well as SOB will keep lorazepam PRN for the next 90 days and then reassess on going use. Continue with hospice cares    Electronically signed by:  Rayne Marks NP               Sincerely,        Rayne Marks NP

## 2019-06-11 NOTE — PROGRESS NOTES
[facility]  Chief Complaint   Patient presents with     jail Regulatory     [place] Medical Record Number:  [id number]  Place of Service where encounter took place:  [facility]  ([facility]) [[id number]]    HPI:    Celeste Hawthorne  is 89 year old (7/30/1915), who is being seen today for a federally mandated E/M visit.  HPI information obtained from: facility chart records, facility staff, patient report and [facility] chart review. Today's concerns are:    chronic combined systolic and diastolic congestive heart failure (H)  Primary pulmonary hypertension (H)  CKD (chronic kidney disease) stage 3, GFR 30-59 ml/min (H)  Hospice care patient  Patient denies increase in shortness of breath or swelling. She denies cough. She did have pneumonia in January, and responded well to antibiotics. At that time it was discussed with the family hospice vs hospitalization, they do not want her hospitalized but does want antibiotics as able. She had overall weakness and stop eating, the family then elected hospice care. She was taken off her nonessential medications upon admission per family request. She denies shortness of breath of chest pain. Her breathing is even and nonlabored.     Dementia without behavioral disturbance, unspecified dementia type  She is now bedbound and total cares. Most days she is not interested in getting dressed or eating. She denies anxiety or depression at this time. She denies pain, she has PRN oxycodone available.       ALLERGIES:Amoxicillin  PAST MEDICAL HISTORY:   has a past medical history of Anemia, Anemia due to blood loss, acute (5/14/2012), Breast cancer (H) (1981), Chronic respiratory failure with hypoxia (H), Congestive heart failure (H), Constipation, DVT (deep vein thrombosis) in pregnancy (H), History of blood transfusion, Hypertension, Left shoulder pain, Macular degeneration, and Pulmonary hypertension (H).  PAST SURGICAL HISTORY:   has a past surgical  history that includes Esophagoscopy (5/11/2012); Breast surgery (1981); Open reduction internal fixation femur distal (5/8/2012); Eye surgery; hemorrhoidectomy; Repair esophageal stricture; and Reverse arthroplasty shoulder (Left, 8/19/2014).  FAMILY HISTORY: family history includes Other Cancer in her son.  SOCIAL HISTORY:  reports that she has never smoked. She has never used smokeless tobacco. She reports that she drinks alcohol. She reports that she does not use drugs.    MEDICATIONS:  Current Outpatient Medications   Medication Sig Dispense Refill     acetaminophen (TYLENOL) 325 MG suppository Place 650 mg rectally every 4 hours as needed for fever       ACETAMINOPHEN PO Take 1,000 mg by mouth 3 times daily        albuterol (PROVENTIL) (2.5 MG/3ML) 0.083% neb solution Take 2.5 mg by nebulization every 2 hours as needed for shortness of breath / dyspnea or wheezing       atenolol (TENORMIN) 25 MG tablet Take 25 mg by mouth daily.       atropine 1 % SOLN Place 4 drops under the tongue every 2 hours as needed for secretions       bisacodyl (DULCOLAX) 10 MG suppository Place 10 mg rectally 2 times daily as needed for constipation       Bumetanide (BUMEX PO) Take 1 mg by mouth daily        Carboxymethylcellulose Sod PF (REFRESH PLUS) 0.5 % SOLN ophthalmic solution Place 2 drops into both eyes 4 times daily AND instill 2 drops into both eyes every 2 hours PRN       HydrALAZINE HCl (APRESOLINE PO) Take 10 mg by mouth 2 times daily        ipratropium - albuterol 0.5 mg/2.5 mg/3 mL (DUONEB) 0.5-2.5 (3) MG/3ML neb solution Take 1 vial by nebulization 4 times daily as needed for shortness of breath / dyspnea        oxyCODONE IR (ROXICODONE) 5 MG tablet Take 0.5 tablets (2.5 mg) by mouth 4 times daily and q 8 hours prn 3 tablet 0     polyethylene glycol (MIRALAX/GLYCOLAX) packet Take 1 packet by mouth daily       trolamine salicylate (ASPERCREME) 10 % cream Apply topically 3 times daily ; Apply to bilateral shoulders for  "shoulder pain. Massage 2\" on each shoulder       Warfarin Therapy Reminder 1 each continuous prn       ferrous sulfate (IRON) 325 (65 Fe) MG tablet Take 1 tablet (325 mg) by mouth daily (with breakfast) (Patient not taking: Reported on 5/23/2019) 90 tablet 2     gabapentin (NEURONTIN) 100 MG capsule Take 300 mg by mouth 2 times daily        haloperidol (HALDOL) 0.5 MG tablet Take 0.5 mg by mouth every 6 hours as needed for agitation       LORazepam (ATIVAN) 0.5 MG tablet Take 0.25 mg by mouth every 4 hours as needed for anxiety       multivitamin (OCUVITE) TABS Take 1 tablet by mouth daily       VITAMIN D, CHOLECALCIFEROL, PO Take 1,000 Units by mouth daily         Case Management:  I have reviewed the care plan and MDS and do agree with the plan. Patient's desire to return to the community is not assessible due to cognitive impairment. Information reviewed:  Medications, vital signs, orders, and nursing notes.    ROS:  Unobtainable secondary to cognitive impairment.     Vitals:  BP (!) 156/107   Pulse 96   Temp 98.1  F (36.7  C)   Resp 18   Ht 1.626 m (5' 4\")   Wt 59.8 kg (131 lb 12.8 oz)   SpO2 92%   BMI 22.62 kg/m    Body mass index is 22.62 kg/m .  Exam:  GENERAL APPEARANCE:  Alert, in no distress, cooperative  RESP:  respiratory effort and palpation of chest normal, lungs clear to auscultation , no respiratory distress, diminished breath sounds bilateral bases  CV:  Palpation and auscultation of heart done , regular rate and rhythm, no murmur, rub, or gallop, peripheral edema trace+ in bilateral lower extremities, tubigrips in place  ABDOMEN:  normal bowel sounds, soft, nontender, no hepatosplenomegaly or other masses  M/S:   Gait and station abnormal now bed bound, total cares, unable to turn self  SKIN:  multiple skin tears on the arms, dry skin  NEURO:   Cranial nerves 2-12 are normal tested and grossly at patient's baseline  PSYCH:  oriented to self and place, memory impaired     Lab/Diagnostic data: "   Recent labs in [facility] reviewed by me today.     ASSESSMENT/PLAN  (I50.42) Chronic combined systolic and diastolic congestive heart failure (H)  (primary encounter diagnosis)  (I27.0) Primary pulmonary hypertension (H)  (N18.4) CKD (chronic kidney disease) stage 4, GFR 15-29 ml/min (H)  Comment: chronic, overall decline in condition  Plan: Will continue with bumex for now but as she declines should decrease her bumex and hydralazine as able. Continue with hospice cares    (F03.90) Dementia without behavioral disturbance, unspecified dementia type  Comment: chronic, worsening  Plan: continue with hospice, SNF, continue to assess for end of life symptoms    (Z51.5) Hospice care patient  Comment: worsening overall condition  Plan: Continue with hospice, no haldol or lorazepam ordered due to regulations, will order if continued decline      Electronically signed by:  Rayne Marks, NP

## 2019-06-11 NOTE — LETTER
6/11/2019        RE: Celeste Hawthorne  [facility]  [address] [facility] [address]        [facility]  Chief Complaint   Patient presents with     half-way Regulatory     [facility] Record Number:  [id number]  Place of Service where encounter took place:  [facility]  ([facility]) [[id number]]    HPI:    Celeste Hawthorne  is 89 year old (7/30/1915), who is being seen today for a federally mandated E/M visit.  HPI information obtained from: facility chart records, facility staff, patient report and [facility] chart review. Today's concerns are:    chronic combined systolic and diastolic congestive heart failure (H)  Primary pulmonary hypertension (H)  CKD (chronic kidney disease) stage 3, GFR 30-59 ml/min (H)  Hospice care patient  Patient denies increase in shortness of breath or swelling. She denies cough. She did have pneumonia in January, and responded well to antibiotics. At that time it was discussed with the family hospice vs hospitalization, they do not want her hospitalized but does want antibiotics as able. She had overall weakness and stop eating, the family then elected hospice care. She was taken off her nonessential medications upon admission per family request. She denies shortness of breath of chest pain. Her breathing is even and nonlabored.     Dementia without behavioral disturbance, unspecified dementia type  She is now bedbound and total cares. Most days she is not interested in getting dressed or eating. She denies anxiety or depression at this time. She denies pain, she has PRN oxycodone available.       ALLERGIES:Amoxicillin  PAST MEDICAL HISTORY:   has a past medical history of Anemia, Anemia due to blood loss, acute (5/14/2012), Breast cancer (H) (1981), Chronic respiratory failure with hypoxia (H), Congestive heart failure (H), Constipation, DVT (deep vein thrombosis) in pregnancy (H), History of blood transfusion, Hypertension,  Left shoulder pain, Macular degeneration, and Pulmonary hypertension (H).  PAST SURGICAL HISTORY:   has a past surgical history that includes Esophagoscopy (5/11/2012); Breast surgery (1981); Open reduction internal fixation femur distal (5/8/2012); Eye surgery; hemorrhoidectomy; Repair esophageal stricture; and Reverse arthroplasty shoulder (Left, 8/19/2014).  FAMILY HISTORY: family history includes Other Cancer in her son.  SOCIAL HISTORY:  reports that she has never smoked. She has never used smokeless tobacco. She reports that she drinks alcohol. She reports that she does not use drugs.    MEDICATIONS:  Current Outpatient Medications   Medication Sig Dispense Refill     acetaminophen (TYLENOL) 325 MG suppository Place 650 mg rectally every 4 hours as needed for fever       ACETAMINOPHEN PO Take 1,000 mg by mouth 3 times daily        albuterol (PROVENTIL) (2.5 MG/3ML) 0.083% neb solution Take 2.5 mg by nebulization every 2 hours as needed for shortness of breath / dyspnea or wheezing       atenolol (TENORMIN) 25 MG tablet Take 25 mg by mouth daily.       atropine 1 % SOLN Place 4 drops under the tongue every 2 hours as needed for secretions       bisacodyl (DULCOLAX) 10 MG suppository Place 10 mg rectally 2 times daily as needed for constipation       Bumetanide (BUMEX PO) Take 1 mg by mouth daily        Carboxymethylcellulose Sod PF (REFRESH PLUS) 0.5 % SOLN ophthalmic solution Place 2 drops into both eyes 4 times daily AND instill 2 drops into both eyes every 2 hours PRN       HydrALAZINE HCl (APRESOLINE PO) Take 10 mg by mouth 2 times daily        ipratropium - albuterol 0.5 mg/2.5 mg/3 mL (DUONEB) 0.5-2.5 (3) MG/3ML neb solution Take 1 vial by nebulization 4 times daily as needed for shortness of breath / dyspnea        oxyCODONE IR (ROXICODONE) 5 MG tablet Take 0.5 tablets (2.5 mg) by mouth 4 times daily and q 8 hours prn 3 tablet 0     polyethylene glycol (MIRALAX/GLYCOLAX) packet Take 1 packet by mouth  "daily       trolamine salicylate (ASPERCREME) 10 % cream Apply topically 3 times daily ; Apply to bilateral shoulders for shoulder pain. Massage 2\" on each shoulder       Warfarin Therapy Reminder 1 each continuous prn       ferrous sulfate (IRON) 325 (65 Fe) MG tablet Take 1 tablet (325 mg) by mouth daily (with breakfast) (Patient not taking: Reported on 5/23/2019) 90 tablet 2     gabapentin (NEURONTIN) 100 MG capsule Take 300 mg by mouth 2 times daily        haloperidol (HALDOL) 0.5 MG tablet Take 0.5 mg by mouth every 6 hours as needed for agitation       LORazepam (ATIVAN) 0.5 MG tablet Take 0.25 mg by mouth every 4 hours as needed for anxiety       multivitamin (OCUVITE) TABS Take 1 tablet by mouth daily       VITAMIN D, CHOLECALCIFEROL, PO Take 1,000 Units by mouth daily         Case Management:  I have reviewed the care plan and MDS and do agree with the plan. Patient's desire to return to the community is not assessible due to cognitive impairment. Information reviewed:  Medications, vital signs, orders, and nursing notes.    ROS:  Unobtainable secondary to cognitive impairment.     Vitals:  BP (!) 156/107   Pulse 96   Temp 98.1  F (36.7  C)   Resp 18   Ht 1.626 m (5' 4\")   Wt 59.8 kg (131 lb 12.8 oz)   SpO2 92%   BMI 22.62 kg/m     Body mass index is 22.62 kg/m .  Exam:  GENERAL APPEARANCE:  Alert, in no distress, cooperative  RESP:  respiratory effort and palpation of chest normal, lungs clear to auscultation , no respiratory distress, diminished breath sounds bilateral bases  CV:  Palpation and auscultation of heart done , regular rate and rhythm, no murmur, rub, or gallop, peripheral edema trace+ in bilateral lower extremities, tubigrips in place  ABDOMEN:  normal bowel sounds, soft, nontender, no hepatosplenomegaly or other masses  M/S:   Gait and station abnormal now bed bound, total cares, unable to turn self  SKIN:  multiple skin tears on the arms, dry skin  NEURO:   Cranial nerves 2-12 are " normal tested and grossly at patient's baseline  PSYCH:  oriented to self and place, memory impaired     Lab/Diagnostic data:   Recent labs in EPIC reviewed by me today.     ASSESSMENT/PLAN  (I50.42) Chronic combined systolic and diastolic congestive heart failure (H)  (primary encounter diagnosis)  (I27.0) Primary pulmonary hypertension (H)  (N18.4) CKD (chronic kidney disease) stage 4, GFR 15-29 ml/min (H)  Comment: chronic, overall decline in condition  Plan: Will continue with bumex for now but as she declines should decrease her bumex and hydralazine as able. Continue with hospice cares    (F03.90) Dementia without behavioral disturbance, unspecified dementia type  Comment: chronic, worsening  Plan: continue with hospice, SNF, continue to assess for end of life symptoms    (Z51.5) Hospice care patient  Comment: worsening overall condition  Plan: Continue with hospice, no haldol or lorazepam ordered due to regulations, will order if continued decline      Electronically signed by:  Rayne Marks NP          Sincerely,        Rayne Marks NP

## 2019-06-25 PROBLEM — Z51.5 HOSPICE CARE PATIENT: Status: ACTIVE | Noted: 2018-01-03

## 2019-06-25 NOTE — PROGRESS NOTES
[facility]  [facility] Record Number:  [id number]  Place of Service where encounter took place:  [facility]  ([facility]) [[id number]]  Chief Complaint   Patient presents with     RECHECK       HPI:    Celeste Hawthorne  is a 89 year old (7/30/1915), who is being seen today for an episodic care visit.  HPI information obtained from: facility chart records, facility staff and patient report. Today's concern is:    Acute on chronic combined systolic and diastolic congestive heart failure (H)  SOB (shortness of breath)  Hospice care patient  Patient is bed bound and taking sips of fluids.  She is being seen today due to nursing home regulations for on going PRN use of haldol and lorazepam. She has these medications available due to end of life cares and high potential for anxiety and acute delirium. Currently not using lorazepam or haldol, but is nearing end of life and due to comfort and goals of care is appropriate for on going use of PRN medications.     Continuation of PRN order for non-antipsychotic psychotropic medication,  lorazepam, is appropriate due to end-stage palliative care goals and anxiety and SOB and is being reordered today with an end date of 90 days.  Nsg to update S provider of frequency of use 80 days prior to end date.      Continue current medications as they are at the lowest possible dose to maintain his/her quality of life and reduction may cause unnecessary psychological suffering    Past Medical and Surgical History reviewed in Epic today.    MEDICATIONS:  Current Outpatient Medications   Medication Sig Dispense Refill     acetaminophen (TYLENOL) 325 MG suppository Place 650 mg rectally every 4 hours as needed for fever       ACETAMINOPHEN PO Take 1,000 mg by mouth 3 times daily        albuterol (PROVENTIL) (2.5 MG/3ML) 0.083% neb solution Take 2.5 mg by nebulization every 2 hours as needed for shortness of breath / dyspnea or wheezing       atenolol (TENORMIN)  "25 MG tablet Take 25 mg by mouth daily.       atropine 1 % SOLN Place 4 drops under the tongue every 2 hours as needed for secretions       bisacodyl (DULCOLAX) 10 MG suppository Place 10 mg rectally 2 times daily as needed for constipation       Bumetanide (BUMEX PO) Take 1 mg by mouth daily        Carboxymethylcellulose Sod PF (REFRESH PLUS) 0.5 % SOLN ophthalmic solution Place 2 drops into both eyes 4 times daily AND instill 2 drops into both eyes every 2 hours PRN       HydrALAZINE HCl (APRESOLINE PO) Take 10 mg by mouth 2 times daily        ipratropium - albuterol 0.5 mg/2.5 mg/3 mL (DUONEB) 0.5-2.5 (3) MG/3ML neb solution Take 1 vial by nebulization 4 times daily as needed for shortness of breath / dyspnea        oxyCODONE IR (ROXICODONE) 5 MG tablet Take 0.5 tablets (2.5 mg) by mouth 4 times daily and q 8 hours prn 3 tablet 0     polyethylene glycol (MIRALAX/GLYCOLAX) packet Take 1 packet by mouth daily       trolamine salicylate (ASPERCREME) 10 % cream Apply topically 3 times daily ; Apply to bilateral shoulders for shoulder pain. Massage 2\" on each shoulder       Warfarin Therapy Reminder 1 each continuous prn         REVIEW OF SYSTEMS:  Unobtainable secondary to cognitive impairment.     Objective:  /72   Pulse 57   Temp 97.8  F (36.6  C)   Resp 18   Ht 1.626 m (5' 4\")   Wt 55.4 kg (122 lb 3.2 oz)   SpO2 95%   BMI 20.98 kg/m    Exam:  GENERAL APPEARANCE:  Alert, in no distress, lethargic  RESP:  respiratory effort and palpation of chest normal, no respiratory distress, diminished breath sounds bilateral bases  CV:  Palpation and auscultation of heart done , regular rate and rhythm, no murmur, rub, or gallop, no edema  ABDOMEN:  normal bowel sounds, soft, nontender, no hepatosplenomegaly or other masses  PSYCH:  oriented to self , memory impaired , falls asleep during assessment    Labs:   no labs due to hospice and goals of care    ASSESSMENT/PLAN:  (I50.43) Acute on chronic combined systolic " and diastolic congestive heart failure (H)  (primary encounter diagnosis)  (R06.02) SOB (shortness of breath)  (Z51.5) Hospice care patient  Comment: worsening overall decline  Plan: Continue with PRN haldol and lorazepam and monitoring for change in condition. Continue with scheduled oxycodone and monitoring for increased SOB. Continue with oxygen for comfort      Electronically signed by:  Rayne Marks NP

## 2024-03-27 NOTE — IP AVS SNAPSHOT
MRN:[id number]                      After Visit Summary   8/13/2018    Celeste Hawthorne    MRN: [id number]           Thank you!     Thank you for choosing [facility] for your care. Our goal is always to provide you with excellent care. Hearing back from our patients is one way we can continue to improve our services. Please take a few minutes to complete the written survey that you may receive in the mail after you visit. If you would like to speak to someone directly about your visit please contact Patient Relations at [phone]. Thank you!          Patient Information     Date Of Birth          7/30/1915        Designated Caregiver       Most Recent Value    Caregiver    Will someone help with your care after discharge? yes    Name of designated caregiver Assisted Living Resident ([facility])    Phone number of caregiver [phone]    Caregiver address [place]      About your hospital stay     You were admitted on:  August 14, 2018 [facility]    You were discharged on:  August 15, 2018       Who to Call     For medical emergencies, please call 911.  For non-urgent questions about your medical care, please call your primary care provider or clinic, [phone]          Attending Provider     Provider Specialty    Marleen Kim MD Emergency Medicine    Jeremías Jeffers MD Emergency Medicine    Julia Fonseca MD Internal Medicine       Primary Care Provider Office Phone # Fax #    Puja ANN MARIE Lockett [phone]      After Care Instructions     Activity - Up with assistive device           Activity - Up with nursing assistance           Advance Diet as Tolerated       Follow this diet upon discharge: Orders Placed This Encounter      Combination Diet Mechanical/Dental Soft Diet;            Fall precautions           General info for SNF       Length of Stay Estimate: Long Term Care  Condition at  Discharge: Stable  Level of care:skilled   Rehabilitation Potential: Fair  Admission H&P remains valid and up-to-date: Yes  Recent Chemotherapy: N/A  Use Nursing Home Standing Orders: Yes            Mantoux instructions       Give two-step Mantoux (PPD) Per Facility Policy Yes            Oxygen - Nasal cannula       2 Lpm by nasal cannula to keep O2 sats 92% or greater.                  Follow-up Appointments     Follow Up and recommended labs and tests       Follow up with residential physician.  The following labs/tests are recommended: INR check in 2 days. CBC, vitamin B12 and Folic acid level in 1 week.                  Warfarin Instruction     You have started taking a medicine called warfarin. This is a blood-thinning medicine (anticoagulant). It helps prevent and treat blood clots.      Before leaving the hospital, make sure you know how much to take and how long to take it.      You will need regular blood tests to make sure your blood is clotting safely. It is very important to see your doctor for regular blood tests.    Talk to your doctor before taking any new medicine (this includes over-the-counter drugs and herbal products). Many medicines can interact with warfarin. This may cause more bleeding or too much clotting.     Eating a lot of vitamin K--found in green, leafy vegetables--can change the way warfarin works in your body. Do NOT avoid these foods. Instead, try to eat the same amount each day.     Bleeding is the most common side-effect of warfarin. You may notice bleeding gums, a bloody nose, bruises and bleeding longer when you cut yourself. See a doctor at once if:   o You cough up blood  o You find blood in your stool (poop)  o You have a deep cut, or a cut that bleeds longer than 10 minutes   o You have a bad cut, hard fall, accident or hit your head (go to urgent care or the emergency room).    For women who can get pregnant: This medicine can harm an unborn baby. Be very careful not to get  "pregnant while taking this medicine. If you think you might be pregnant, call your doctor right away.    For more information, read \"Guide to Warfarin Therapy,  the booklet you received in the hospital.        Pending Results     Date and Time Order Name Status Description    8/14/2018 0228 Sputum Culture Aerobic Bacterial Preliminary     8/13/2018 2330 Blood culture Preliminary     8/13/2018 2312 Blood culture Preliminary             Statement of Approval     Ordered          08/15/18 1139  I have reviewed and agree with all the recommendations and orders detailed in this document.  EFFECTIVE NOW     Approved and electronically signed by:  Harshad Kitchen MD             Admission Information     Date & Time Provider Department Dept. Phone    8/13/2018 Julia Fonseca MD [facility] [phone]      Your Vitals Were     Blood Pressure Pulse Temperature Respirations Height Weight    140/69 (BP Location: Right arm) 89 98.6  F (37  C) (Oral) 16 1.626 m (5' 4\") 67.6 kg (149 lb 1.6 oz)    Pulse Oximetry BMI (Body Mass Index)                97% 25.59 kg/m2          Care EveryWhere ID     This is your Care EveryWhere ID. This could be used by other organizations to access your [facility] medical records  [id number]        Equal Access to Services     CORI BUSH AH: Jagdish Daley, waritoda alicia, qaybta kaalmada vinod, marysol zambrano. So [facility] [phone].    ATENCIÓN: Si habla español, tiene a bailon disposición servicios gratuitos de asistencia lingüística. Llame al [phone].    We comply with applicable federal civil rights laws and [place] laws. We do not discriminate on the basis of race, color, national origin, age, disability, sex, sexual orientation, or gender identity.               Review of your medicines      START taking        Dose / Directions    azithromycin 250 MG tablet   Commonly known as:  ZITHROMAX   Used for:  Pneumonia of right " lung due to infectious organism, unspecified part of lung        Dose:  250 mg   Start taking on:  8/16/2018   Take 1 tablet (250 mg) by mouth daily for 3 days   Quantity:  3 tablet   Refills:  0       cefuroxime 500 MG tablet   Commonly known as:  CEFTIN   Used for:  Pneumonia of right lung due to infectious organism, unspecified part of lung        Dose:  500 mg   Start taking on:  8/16/2018   Take 1 tablet (500 mg) by mouth 2 times daily for 5 days   Quantity:  10 tablet   Refills:  0       ferrous sulfate 325 (65 Fe) MG tablet   Commonly known as:  IRON   Used for:  Anemia, unspecified type        Dose:  325 mg   Take 1 tablet (325 mg) by mouth daily (with breakfast)   Quantity:  90 tablet   Refills:  2       pantoprazole 40 MG EC tablet   Commonly known as:  PROTONIX   Used for:  Anemia, unspecified type        Dose:  40 mg   Take 1 tablet (40 mg) by mouth daily Take 30-60 minutes before a meal.   Quantity:  30 tablet   Refills:  1         CONTINUE these medicines which may have CHANGED, or have new prescriptions. If we are uncertain of the size of tablets/capsules you have at home, strength may be listed as something that might have changed.        Dose / Directions    oxyCODONE IR 5 MG tablet   Commonly known as:  ROXICODONE   Indication:  Chronic Pain   This may have changed:    - medication strength  - Another medication with the same name was removed. Continue taking this medication, and follow the directions you see here.   Used for:  Other chronic pain        Dose:  2.5 mg   Take 0.5 tablets (2.5 mg) by mouth 4 times daily and q 8 hours prn   Quantity:  3 tablet   Refills:  0         CONTINUE these medicines which have NOT CHANGED        Dose / Directions    ACETAMINOPHEN PO        Dose:  1000 mg   Take 1,000 mg by mouth 3 times daily   Refills:  0       APRESOLINE PO   Indication:  Chronic Left Systolic Heart Failure, High Blood Pressure Disorder        Dose:  10 mg   Take 10 mg by mouth 3 times daily    Refills:  0       atenolol 25 MG tablet   Commonly known as:  TENORMIN        Dose:  25 mg   Take 25 mg by mouth daily.   Refills:  0       BUMEX PO        Dose:  1 mg   Take 1 mg by mouth daily   Refills:  0       Carboxymethylcellulose Sod PF 0.5 % Soln ophthalmic solution   Commonly known as:  REFRESH PLUS        Dose:  1 drop   Place 1 drop into both eyes 4 times daily as needed for dry eyes   Refills:  0       gabapentin 100 MG capsule   Commonly known as:  NEURONTIN        Dose:  300 mg   Take 300 mg by mouth 2 times daily   Refills:  0       multivitamin Tabs tablet        Dose:  1 tablet   Take 1 tablet by mouth daily   Refills:  0       polyethylene glycol Packet   Commonly known as:  MIRALAX/GLYCOLAX        Dose:  1 packet   Take 1 packet by mouth daily   Refills:  0       VITAMIN D (CHOLECALCIFEROL) PO        Dose:  1000 Units   Take 1,000 Units by mouth daily   Refills:  0       * Warfarin Therapy Reminder        Dose:  1 each   1 each continuous prn   Refills:  0       * WARFARIN SODIUM PO        Dose:  3 mg   Take 3 mg by mouth Sun, Mon, Wed, Thu, Fri, Sat   Refills:  0       * WARFARIN SODIUM PO        Dose:  5 mg   Take 5 mg by mouth Tuesdays   Refills:  0       * Notice:  This list has 3 medication(s) that are the same as other medications prescribed for you. Read the directions carefully, and ask your doctor or other care provider to review them with you.         Where to get your medicines      Some of these will need a paper prescription and others can be bought over the counter. Ask your nurse if you have questions.     You don't need a prescription for these medications     azithromycin 250 MG tablet    cefuroxime 500 MG tablet    ferrous sulfate 325 (65 Fe) MG tablet    pantoprazole 40 MG EC tablet         Information about where to get these medications is not yet available     ! Ask your nurse or doctor about these medications     oxyCODONE IR 5 MG tablet                Protect others  around you: Learn how to safely use, store and throw away your medicines at www.disposemymeds.org.        ANTIBIOTIC INSTRUCTION     You've Been Prescribed an Antibiotic - Now What?  Your healthcare team thinks that you or your loved one might have an infection. Some infections can be treated with antibiotics, which are powerful, life-saving drugs. Like all medications, antibiotics have side effects and should only be used when necessary. There are some important things you should know about your antibiotic treatment.      Your healthcare team may run tests before you start taking an antibiotic.    Your team may take samples (e.g., from your blood, urine or other areas) to run tests to look for bacteria. These test can be important to determine if you need an antibiotic at all and, if you do, which antibiotic will work best.      Within a few days, your healthcare team might change or even stop your antibiotic.    Your team may start you on an antibiotic while they are working to find out what is making you sick.    Your team might change your antibiotic because test results show that a different antibiotic would be better to treat your infection.    In some cases, once your team has more information, they learn that you do not need an antibiotic at all. They may find out that you don't have an infection, or that the antibiotic you're taking won't work against your infection. For example, an infection caused by a virus can't be treated with antibiotics. Staying on an antibiotic when you don't need it is more likely to be harmful than helpful.      You may experience side effects from your antibiotic.    Like all medications, antibiotics have side effects. Some of these can be serious.    Let you healthcare team know if you have any known allergies when you are admitted to the hospital.    One significant side effect of nearly all antibiotics is the risk of severe and sometimes deadly diarrhea caused by Clostridium  difficile (C. Difficile). This occurs when a person takes antibiotics because some good germs are destroyed. Antibiotic use allows C. diificile to take over, putting patients at high risk for this serious infection.    As a patient or caregiver, it is important to understand your or your loved one's antibiotic treatment. It is especially important for caregivers to speak up when patients can't speak for themselves. Here are some important questions to ask your healthcare team.    What infection is this antibiotic treating and how do you know I have that infection?    What side effects might occur from this antibiotic?    How long will I need to take this antibiotic?    Is it safe to take this antibiotic with other medications or supplements (e.g., vitamins) that I am taking?     Are there any special directions I need to know about taking this antibiotic? For example, should I take it with food?    How will I be monitored to know whether my infection is responding to the antibiotic?    What tests may help to make sure the right antibiotic is prescribed for me?      Information provided by:  www.cdc.gov/getsmart  U.S. Department of Health and Human Services  Centers for disease Control and Prevention  National Center for Emerging and Zoonotic Infectious Diseases  Division of Healthcare Quality Promotion        Information about OPIOIDS     PRESCRIPTION OPIOIDS: WHAT YOU NEED TO KNOW   We gave you an opioid (narcotic) pain medicine. It is important to manage your pain, but opioids are not always the best choice. You should first try all the other options your care team gave you. Take this medicine for as short a time (and as few doses) as possible.    Some activities can increase your pain, such as bandage changes or therapy sessions. It may help to take your pain medicine 30 to 60 minutes before these activities. Reduce your stress by getting enough sleep, working on hobbies you enjoy and practicing relaxation or  meditation. Talk to your care team about ways to manage your pain beyond prescription opioids.    These medicines have risks:    DO NOT drive when on new or higher doses of pain medicine. These medicines can affect your alertness and reaction times, and you could be arrested for driving under the influence (DUI). If you need to use opioids long-term, talk to your care team about driving.    DO NOT operate heavy machinery    DO NOT do any other dangerous activities while taking these medicines.    DO NOT drink any alcohol while taking these medicines.     If the opioid prescribed includes acetaminophen, DO NOT take with any other medicines that contain acetaminophen. Read all labels carefully. Look for the word  acetaminophen  or  Tylenol.  Ask your pharmacist if you have questions or are unsure.    You can get addicted to pain medicines, especially if you have a history of addiction (chemical, alcohol or substance dependence). Talk to your care team about ways to reduce this risk.    All opioids tend to cause constipation. Drink plenty of water and eat foods that have a lot of fiber, such as fruits, vegetables, prune juice, apple juice and high-fiber cereal. Take a laxative (Miralax, milk of magnesia, Colace, Senna) if you don t move your bowels at least every other day. Other side effects include upset stomach, sleepiness, dizziness, throwing up, tolerance (needing more of the medicine to have the same effect), physical dependence and slowed breathing.    Store your pills in a secure place, locked if possible. We will not replace any lost or stolen medicine. If you don t finish your medicine, please throw away (dispose) as directed by your pharmacist. The [place] Pollution Control Agency has more information about safe disposal: https://www.pca.[facility].[place]/living-green/managing-unwanted-medications             Medication List: This is a list of all your medications and when to take them. Check marks below  indicate your daily home schedule. Keep this list as a reference.      Medications           Morning Afternoon Evening Bedtime As Needed    ACETAMINOPHEN PO   Take 1,000 mg by mouth 3 times daily   Last time this was given:  1,000 mg on 8/15/2018 10:00 AM                                APRESOLINE PO   Take 10 mg by mouth 3 times daily                                atenolol 25 MG tablet   Commonly known as:  TENORMIN   Take 25 mg by mouth daily.   Last time this was given:  25 mg on 8/15/2018 10:01 AM                                azithromycin 250 MG tablet   Commonly known as:  ZITHROMAX   Take 1 tablet (250 mg) by mouth daily for 3 days   Start taking on:  8/16/2018                                BUMEX PO   Take 1 mg by mouth daily   Last time this was given:  1 mg on 8/15/2018 10:01 AM                                Carboxymethylcellulose Sod PF 0.5 % Soln ophthalmic solution   Commonly known as:  REFRESH PLUS   Place 1 drop into both eyes 4 times daily as needed for dry eyes                                cefuroxime 500 MG tablet   Commonly known as:  CEFTIN   Take 1 tablet (500 mg) by mouth 2 times daily for 5 days   Start taking on:  8/16/2018                                ferrous sulfate 325 (65 Fe) MG tablet   Commonly known as:  IRON   Take 1 tablet (325 mg) by mouth daily (with breakfast)                                gabapentin 100 MG capsule   Commonly known as:  NEURONTIN   Take 300 mg by mouth 2 times daily   Last time this was given:  300 mg on 8/15/2018 10:02 AM                                multivitamin Tabs tablet   Take 1 tablet by mouth daily                                oxyCODONE IR 5 MG tablet   Commonly known as:  ROXICODONE   Take 0.5 tablets (2.5 mg) by mouth 4 times daily and q 8 hours prn   Last time this was given:  2.5 mg on 8/15/2018 10:01 AM                                pantoprazole 40 MG EC tablet   Commonly known as:  PROTONIX   Take 1 tablet (40 mg) by mouth daily Take 30-60  minutes before a meal.                                polyethylene glycol Packet   Commonly known as:  MIRALAX/GLYCOLAX   Take 1 packet by mouth daily   Last time this was given:  17 g on 8/15/2018 10:02 AM                                VITAMIN D (CHOLECALCIFEROL) PO   Take 1,000 Units by mouth daily                                * Warfarin Therapy Reminder   1 each continuous prn                                * WARFARIN SODIUM PO   Take 3 mg by mouth Sun, Mon, Wed, Thu, Fri, Sat   Last time this was given:  3 mg on 8/14/2018  7:00 PM                                * WARFARIN SODIUM PO   Take 5 mg by mouth Tuesdays   Last time this was given:  3 mg on 8/14/2018  7:00 PM                                * Notice:  This list has 3 medication(s) that are the same as other medications prescribed for you. Read the directions carefully, and ask your doctor or other care provider to review them with you.       0